# Patient Record
Sex: FEMALE | Race: OTHER | Employment: PART TIME | URBAN - METROPOLITAN AREA
[De-identification: names, ages, dates, MRNs, and addresses within clinical notes are randomized per-mention and may not be internally consistent; named-entity substitution may affect disease eponyms.]

---

## 2017-03-28 ENCOUNTER — TELEPHONE (OUTPATIENT)
Dept: INTERNAL MEDICINE CLINIC | Age: 27
End: 2017-03-28

## 2017-03-28 NOTE — TELEPHONE ENCOUNTER
Pt (p) [phone], pt would like a return call back to see if she can get the lab Core telephone number from the location that is right next door to the practice.

## 2017-03-31 ENCOUNTER — OFFICE VISIT (OUTPATIENT)
Dept: INTERNAL MEDICINE CLINIC | Age: 27
End: 2017-03-31

## 2017-03-31 VITALS
BODY MASS INDEX: 26.77 KG/M2 | HEIGHT: 61 IN | WEIGHT: 141.8 LBS | DIASTOLIC BLOOD PRESSURE: 62 MMHG | TEMPERATURE: 97.9 F | HEART RATE: 79 BPM | RESPIRATION RATE: 14 BRPM | SYSTOLIC BLOOD PRESSURE: 120 MMHG | OXYGEN SATURATION: 98 %

## 2017-03-31 DIAGNOSIS — F41.9 ANXIETY: ICD-10-CM

## 2017-03-31 DIAGNOSIS — F90.0 ATTENTION DEFICIT HYPERACTIVITY DISORDER (ADHD), PREDOMINANTLY INATTENTIVE TYPE: Primary | ICD-10-CM

## 2017-03-31 RX ORDER — DEXTROAMPHETAMINE SACCHARATE, AMPHETAMINE ASPARTATE, DEXTROAMPHETAMINE SULFATE AND AMPHETAMINE SULFATE 5; 5; 5; 5 MG/1; MG/1; MG/1; MG/1
20 TABLET ORAL 3 TIMES DAILY
Qty: 90 TAB | Refills: 0 | Status: SHIPPED | OUTPATIENT
Start: 2017-06-30 | End: 2017-05-03 | Stop reason: SDUPTHER

## 2017-03-31 RX ORDER — DEXTROAMPHETAMINE SACCHARATE, AMPHETAMINE ASPARTATE, DEXTROAMPHETAMINE SULFATE AND AMPHETAMINE SULFATE 5; 5; 5; 5 MG/1; MG/1; MG/1; MG/1
20 TABLET ORAL 3 TIMES DAILY
Qty: 90 TAB | Refills: 0 | Status: SHIPPED | OUTPATIENT
Start: 2017-08-31 | End: 2017-05-02 | Stop reason: SDUPTHER

## 2017-03-31 RX ORDER — DEXTROAMPHETAMINE SACCHARATE, AMPHETAMINE ASPARTATE, DEXTROAMPHETAMINE SULFATE AND AMPHETAMINE SULFATE 5; 5; 5; 5 MG/1; MG/1; MG/1; MG/1
20 TABLET ORAL 3 TIMES DAILY
Qty: 90 TAB | Refills: 0 | Status: SHIPPED | OUTPATIENT
Start: 2017-04-30 | End: 2017-10-11

## 2017-03-31 RX ORDER — ALPRAZOLAM 0.5 MG/1
0.5 TABLET ORAL
Qty: 90 TAB | Refills: 5 | Status: SHIPPED | OUTPATIENT
Start: 2017-03-31 | End: 2017-07-09 | Stop reason: SDUPTHER

## 2017-03-31 RX ORDER — DEXTROAMPHETAMINE SACCHARATE, AMPHETAMINE ASPARTATE, DEXTROAMPHETAMINE SULFATE AND AMPHETAMINE SULFATE 5; 5; 5; 5 MG/1; MG/1; MG/1; MG/1
20 TABLET ORAL 3 TIMES DAILY
Qty: 90 TAB | Refills: 0 | Status: SHIPPED | OUTPATIENT
Start: 2017-05-31 | End: 2017-09-20 | Stop reason: SDUPTHER

## 2017-03-31 RX ORDER — DEXTROAMPHETAMINE SACCHARATE, AMPHETAMINE ASPARTATE, DEXTROAMPHETAMINE SULFATE AND AMPHETAMINE SULFATE 5; 5; 5; 5 MG/1; MG/1; MG/1; MG/1
20 TABLET ORAL 3 TIMES DAILY
Qty: 90 TAB | Refills: 0 | Status: SHIPPED | OUTPATIENT
Start: 2017-03-31 | End: 2017-10-11

## 2017-03-31 RX ORDER — DEXTROAMPHETAMINE SACCHARATE, AMPHETAMINE ASPARTATE, DEXTROAMPHETAMINE SULFATE AND AMPHETAMINE SULFATE 5; 5; 5; 5 MG/1; MG/1; MG/1; MG/1
20 TABLET ORAL 3 TIMES DAILY
Qty: 90 TAB | Refills: 0 | Status: SHIPPED | OUTPATIENT
Start: 2017-07-31 | End: 2017-05-03 | Stop reason: SDUPTHER

## 2017-03-31 NOTE — PROGRESS NOTES
HISTORY OF PRESENT ILLNESS  Gbaby Baer is a 27 y.o. female. HPI  Here for add. She is a  doing well with her meds. She is recently . Her  is bipolar so this is stressful. She needs her xanax refilled. Past Medical History:   Diagnosis Date    ADHD (attention deficit hyperactivity disorder)     Anxiety     Depression 4/3/2010    Glucose intolerance (impaired glucose tolerance)     IBD (inflammatory bowel disease)     Insomnia 4/3/2010     Current Outpatient Prescriptions   Medication Sig    [START ON 8/31/2017] dextroamphetamine-amphetamine (ADDERALL) 20 mg tablet Take 1 Tab (20 mg total) by mouth three (3) times dailyEarliest Fill Date: 8/31/17.  [START ON 7/31/2017] dextroamphetamine-amphetamine (ADDERALL) 20 mg tablet Take 1 Tab (20 mg total) by mouth three (3) times dailyEarliest Fill Date: 7/31/17. Max Daily Amount: 60 mg    [START ON 6/30/2017] dextroamphetamine-amphetamine (ADDERALL) 20 mg tablet Take 1 Tab (20 mg total) by mouth three (3) times dailyEarliest Fill Date: 6/30/17. Max Daily Amount: 60 mg    [START ON 5/31/2017] dextroamphetamine-amphetamine (ADDERALL) 20 mg tablet Take 1 Tab (20 mg total) by mouth three (3) times dailyEarliest Fill Date: 5/31/17. Max Daily Amount: 60 mg    [START ON 4/30/2017] dextroamphetamine-amphetamine (ADDERALL) 20 mg tablet Take 1 Tab (20 mg total) by mouth three (3) times dailyEarliest Fill Date: 4/30/17. Max Daily Amount: 60 mg    dextroamphetamine-amphetamine (ADDERALL) 20 mg tablet Take 1 Tab (20 mg total) by mouth three (3) times daily. Max Daily Amount: 60 mg    ALPRAZolam (XANAX) 0.5 mg tablet Take 1 Tab by mouth three (3) times daily as needed for Anxiety. Max Daily Amount: 1.5 mg.    sulfaSALAzine EC (AZULFIDINE) 500 mg EC tablet Take 500 mg by mouth four (4) times daily.  valACYclovir (VALTREX) 500 mg tablet Take  by mouth two (2) times a day.     loperamide (IMODIUM) 2 mg capsule TAKE ONE CAPSULE BY MOUTH FOUR TIMES DAILY AS NEEDED FOR DIARRHEA     No current facility-administered medications for this visit. Review of Systems   All other systems reviewed and are negative. Visit Vitals    /62 (BP 1 Location: Left arm, BP Patient Position: Sitting)    Pulse 79    Temp 97.9 °F (36.6 °C) (Oral)    Resp 14    Ht 5' 1\" (1.549 m)    Wt 141 lb 12.8 oz (64.3 kg)    SpO2 98%    BMI 26.79 kg/m2       Physical Exam   Constitutional: She appears well-developed and well-nourished. Psychiatric: She has a normal mood and affect. Her behavior is normal. Judgment and thought content normal.   Nursing note and vitals reviewed. ASSESSMENT and PLAN  Renita Rai was seen today for depression, anxiety and inflammatory bowel disease. Diagnoses and all orders for this visit:    Attention deficit hyperactivity disorder (ADHD), predominantly inattentive type  -     dextroamphetamine-amphetamine (ADDERALL) 20 mg tablet; Take 1 Tab (20 mg total) by mouth three (3) times dailyEarliest Fill Date: 8/31/17.  -     dextroamphetamine-amphetamine (ADDERALL) 20 mg tablet; Take 1 Tab (20 mg total) by mouth three (3) times dailyEarliest Fill Date: 7/31/17. Max Daily Amount: 60 mg  -     dextroamphetamine-amphetamine (ADDERALL) 20 mg tablet; Take 1 Tab (20 mg total) by mouth three (3) times dailyEarliest Fill Date: 6/30/17. Max Daily Amount: 60 mg  -     dextroamphetamine-amphetamine (ADDERALL) 20 mg tablet; Take 1 Tab (20 mg total) by mouth three (3) times dailyEarliest Fill Date: 5/31/17. Max Daily Amount: 60 mg  -     dextroamphetamine-amphetamine (ADDERALL) 20 mg tablet; Take 1 Tab (20 mg total) by mouth three (3) times dailyEarliest Fill Date: 4/30/17. Max Daily Amount: 60 mg  -     dextroamphetamine-amphetamine (ADDERALL) 20 mg tablet; Take 1 Tab (20 mg total) by mouth three (3) times daily. Max Daily Amount: 60 mg  The current medical regimen is effective;  continue present plan and medications.     Anxiety  - ALPRAZolam (XANAX) 0.5 mg tablet; Take 1 Tab by mouth three (3) times daily as needed for Anxiety. Max Daily Amount: 1.5 mg. The current medical regimen is effective;  continue present plan and medications.     Reviewed plan of care with the patient who has provided input and agrees with the goals

## 2017-03-31 NOTE — PROGRESS NOTES
1. Have you been to the ER, urgent care clinic since your last visit? Hospitalized since your last visit? Yes, Monday to [facility], dx with tonsilitis, on Rx but unsure of name    2. Have you seen or consulted any other health care providers outside of the [address] [place] [address] since your last visit? Include any pap smears or colon screening. See above    Chief Complaint   Patient presents with    Depression    Anxiety    Inflammatory Bowel Disease     Not fasting. Requesting refill of Adderall.

## 2017-03-31 NOTE — MR AVS SNAPSHOT
Visit Information Date & Time Provider Department Dept. Phone Encounter #  
 3/31/2017 10:15 AM Honorio Dye MD [facility] [phone] Upcoming Health Maintenance Date Due DTaP/Tdap/Td series (1 - Tdap) 6/11/2009 PAP AKA CERVICAL CYTOLOGY 7/1/2017 COLONOSCOPY 1/21/2025 Allergies as of 3/31/2017  Review Complete On: 3/31/2017 By: Yair Oviedo Severity Noted Reaction Type Reactions Cephalexin Medium 06/08/2015   Topical Rash Pcn [Penicillins]  02/05/2013    Rash Current Immunizations  Reviewed on 10/3/2016 Name Date Influenza Vaccine 10/29/2015, 11/5/2014, 9/11/2013, 12/5/2012 Influenza Vaccine Irvinryan Luis Antonio) 10/3/2016 Not reviewed this visit You Were Diagnosed With   
  
 Codes Comments Attention deficit hyperactivity disorder (ADHD), predominantly inattentive type    -  Primary ICD-10-CM: F90.0 ICD-9-CM: 314.00 Anxiety     ICD-10-CM: F41.9 ICD-9-CM: 300.00 Vitals BP Pulse Temp Resp Height(growth percentile) Weight(growth percentile) 120/62 (BP 1 Location: Left arm, BP Patient Position: Sitting) 79 97.9 °F (36.6 °C) (Oral) 14 5' 1\" (1.549 m) 141 lb 12.8 oz (64.3 kg) SpO2 BMI OB Status Smoking Status 98% 26.79 kg/m2 Medically Induced Former Smoker Vitals History BMI and BSA Data Body Mass Index Body Surface Area  
 26.79 kg/m 2 1.66 m 2 Preferred Pharmacy Pharmacy Name Phone [facility] [address] [phone] Your Updated Medication List  
  
   
This list is accurate as of: 3/31/17 10:42 AM.  Always use your most recent med list.  
  
  
  
  
 ALPRAZolam 0.5 mg tablet Commonly known as:  Vivian Leaks Take 1 Tab by mouth three (3) times daily as needed for Anxiety. Max Daily Amount: 1.5 mg.  
  
 * dextroamphetamine-amphetamine 20 mg tablet Commonly known as:  ADDERALL Take 1 Tab (20 mg total) by mouth three (3) times daily. Max Daily Amount: 60 mg  
  
 * dextroamphetamine-amphetamine 20 mg tablet Commonly known as:  ADDERALL Take 1 Tab (20 mg total) by mouth three (3) times dailyEarliest Fill Date: 4/30/17. Max Daily Amount: 60 mg  
Start taking on:  4/30/2017 * dextroamphetamine-amphetamine 20 mg tablet Commonly known as:  ADDERALL Take 1 Tab (20 mg total) by mouth three (3) times dailyEarliest Fill Date: 5/31/17. Max Daily Amount: 60 mg  
Start taking on:  5/31/2017 * dextroamphetamine-amphetamine 20 mg tablet Commonly known as:  ADDERALL Take 1 Tab (20 mg total) by mouth three (3) times dailyEarliest Fill Date: 6/30/17. Max Daily Amount: 60 mg  
Start taking on:  6/30/2017 * dextroamphetamine-amphetamine 20 mg tablet Commonly known as:  ADDERALL Take 1 Tab (20 mg total) by mouth three (3) times dailyEarliest Fill Date: 7/31/17. Max Daily Amount: 60 mg  
Start taking on:  7/31/2017 * dextroamphetamine-amphetamine 20 mg tablet Commonly known as:  ADDERALL Take 1 Tab (20 mg total) by mouth three (3) times dailyEarliest Fill Date: 8/31/17. Start taking on:  8/31/2017  
  
 loperamide 2 mg capsule Commonly known as:  IMODIUM  
TAKE ONE CAPSULE BY MOUTH FOUR TIMES DAILY AS NEEDED FOR DIARRHEA  
  
 sulfaSALAzine  mg EC tablet Commonly known as:  AZULFIDINE Take 500 mg by mouth four (4) times daily. valACYclovir 500 mg tablet Commonly known as:  VALTREX Take  by mouth two (2) times a day. * Notice: This list has 6 medication(s) that are the same as other medications prescribed for you. Read the directions carefully, and ask your doctor or other care provider to review them with you. Prescriptions Printed Refills  
 dextroamphetamine-amphetamine (ADDERALL) 20 mg tablet 0 Starting on: 8/31/2017  Sig: Take 1 Tab (20 mg total) by mouth three (3) times dailyEarliest Fill Date: 8/31/17. Class: Print Route: Oral  
 dextroamphetamine-amphetamine (ADDERALL) 20 mg tablet 0 Starting on: 7/31/2017 Sig: Take 1 Tab (20 mg total) by mouth three (3) times dailyEarliest Fill Date: 7/31/17. Max Daily Amount: 60 mg  
 Class: Print Route: Oral  
 dextroamphetamine-amphetamine (ADDERALL) 20 mg tablet 0 Starting on: 6/30/2017 Sig: Take 1 Tab (20 mg total) by mouth three (3) times dailyEarliest Fill Date: 6/30/17. Max Daily Amount: 60 mg  
 Class: Print Route: Oral  
 dextroamphetamine-amphetamine (ADDERALL) 20 mg tablet 0 Starting on: 5/31/2017 Sig: Take 1 Tab (20 mg total) by mouth three (3) times dailyEarliest Fill Date: 5/31/17. Max Daily Amount: 60 mg  
 Class: Print Route: Oral  
 dextroamphetamine-amphetamine (ADDERALL) 20 mg tablet 0 Starting on: 4/30/2017 Sig: Take 1 Tab (20 mg total) by mouth three (3) times dailyEarliest Fill Date: 4/30/17. Max Daily Amount: 60 mg  
 Class: Print Route: Oral  
 dextroamphetamine-amphetamine (ADDERALL) 20 mg tablet 0 Sig: Take 1 Tab (20 mg total) by mouth three (3) times daily. Max Daily Amount: 60 mg  
 Class: Print Route: Oral  
 ALPRAZolam (XANAX) 0.5 mg tablet 5 Sig: Take 1 Tab by mouth three (3) times daily as needed for Anxiety. Max Daily Amount: 1.5 mg.  
 Class: Print Route: Oral  
  
Introducing [facility] & HEALTH SERVICES! [employer] introduces [employer] patient portal. Now you can access parts of your medical record, email your doctor's office, and request medication refills online. 1. In your internet browser, go to [email or web address] 2. Click on the First Time User? Click Here link in the Sign In box. You will see the New Member Sign Up page. 3. Enter your Everloop Access Code exactly as it appears below. You will not need to use this code after youve completed the sign-up process. If you do not sign up before the expiration date, you must request a new code. · [employer] Access Code: [email or web address] Expires: 6/29/2017 10:42 AM 
 
4. Enter the last four digits of your Social Security Number (xxxx) and Date of Birth (mm/dd/yyyy) as indicated and click Submit. You will be taken to the next sign-up page. 5. Create a [employer] ID. This will be your [employer] login ID and cannot be changed, so think of one that is secure and easy to remember. 6. Create a [employer] password. You can change your password at any time. 7. Enter your Password Reset Question and Answer. This can be used at a later time if you forget your password. 8. Enter your e-mail address. You will receive e-mail notification when new information is available in [address] 9. Click Sign Up. You can now view and download portions of your medical record. 10. Click the Download Summary menu link to download a portable copy of your medical information. If you have questions, please visit the Frequently Asked Questions section of the [employer] website. Remember, [employer] is NOT to be used for urgent needs. For medical emergencies, dial 911. Now available from your iPhone and Android! Please provide this summary of care documentation to your next provider. Your primary care clinician is listed as [id number] [address] [place] [address] [address]. If you have any questions after today's visit, please call [phone].

## 2017-05-02 DIAGNOSIS — F90.0 ATTENTION DEFICIT HYPERACTIVITY DISORDER (ADHD), PREDOMINANTLY INATTENTIVE TYPE: ICD-10-CM

## 2017-05-03 ENCOUNTER — TELEPHONE (OUTPATIENT)
Dept: INTERNAL MEDICINE CLINIC | Age: 27
End: 2017-05-03

## 2017-05-03 RX ORDER — DEXTROAMPHETAMINE SACCHARATE, AMPHETAMINE ASPARTATE, DEXTROAMPHETAMINE SULFATE AND AMPHETAMINE SULFATE 5; 5; 5; 5 MG/1; MG/1; MG/1; MG/1
20 TABLET ORAL 3 TIMES DAILY
Qty: 90 TAB | Refills: 0 | Status: SHIPPED | OUTPATIENT
Start: 2017-05-03 | End: 2017-09-20 | Stop reason: SDUPTHER

## 2017-05-03 RX ORDER — DEXTROAMPHETAMINE SACCHARATE, AMPHETAMINE ASPARTATE, DEXTROAMPHETAMINE SULFATE AND AMPHETAMINE SULFATE 5; 5; 5; 5 MG/1; MG/1; MG/1; MG/1
20 TABLET ORAL 3 TIMES DAILY
Qty: 90 TAB | Refills: 0 | Status: SHIPPED | OUTPATIENT
Start: 2017-07-01 | End: 2017-09-20 | Stop reason: SDUPTHER

## 2017-05-03 RX ORDER — DEXTROAMPHETAMINE SACCHARATE, AMPHETAMINE ASPARTATE, DEXTROAMPHETAMINE SULFATE AND AMPHETAMINE SULFATE 5; 5; 5; 5 MG/1; MG/1; MG/1; MG/1
20 TABLET ORAL 3 TIMES DAILY
Qty: 90 TAB | Refills: 0 | Status: SHIPPED | OUTPATIENT
Start: 2017-06-02 | End: 2017-09-20 | Stop reason: SDUPTHER

## 2017-07-18 ENCOUNTER — OFFICE VISIT (OUTPATIENT)
Dept: INTERNAL MEDICINE CLINIC | Age: 27
End: 2017-07-18

## 2017-07-18 VITALS
DIASTOLIC BLOOD PRESSURE: 70 MMHG | HEIGHT: 61 IN | BODY MASS INDEX: 25.37 KG/M2 | OXYGEN SATURATION: 99 % | HEART RATE: 84 BPM | TEMPERATURE: 98.1 F | WEIGHT: 134.4 LBS | RESPIRATION RATE: 18 BRPM | SYSTOLIC BLOOD PRESSURE: 113 MMHG

## 2017-07-18 DIAGNOSIS — J00 ACUTE NASOPHARYNGITIS: Primary | ICD-10-CM

## 2017-07-18 LAB
S PYO AG THROAT QL: NORMAL
VALID INTERNAL CONTROL?: YES

## 2017-07-18 NOTE — MR AVS SNAPSHOT
Visit Information Date & Time Provider Department Dept. Phone Encounter #  
 7/18/2017  1:45 PM Anselmo Bustos MD [facility] [phone] [id number] Follow-up Instructions Return if symptoms worsen or fail to improve. Follow-up and Disposition History Your Appointments 9/20/2017 10:45 AM  
ROUTINE CARE with Anselmo Bustos MD  
[facility] Appt Note: 5 month f/u  
 [place] [address] Pending sale to [facility] [address]  
[address] [address] [address] [address] Upcoming Health Maintenance Date Due DTaP/Tdap/Td series (1 - Tdap) 6/11/2009 PAP AKA CERVICAL CYTOLOGY 7/1/2017 INFLUENZA AGE 9 TO ADULT 8/1/2017 COLONOSCOPY 1/21/2025 Allergies as of 7/18/2017  Review Complete On: 7/18/2017 By: Cindra Channel Severity Noted Reaction Type Reactions Cephalexin Medium 06/08/2015   Topical Rash Pcn [Penicillins]  02/05/2013    Rash Current Immunizations  Reviewed on 10/3/2016 Name Date Influenza Vaccine 10/29/2015, 11/5/2014, 9/11/2013, 12/5/2012 Influenza Vaccine Teresa Tejeda) 10/3/2016 Not reviewed this visit You Were Diagnosed With   
  
 Codes Comments Acute nasopharyngitis    -  Primary ICD-10-CM: [place] ICD-9-CM: 203 Vitals BP Pulse Temp Resp Height(growth percentile) Weight(growth percentile) 113/70 (BP 1 Location: Left arm, BP Patient Position: At rest) 84 98.1 °F (36.7 °C) (Oral) 18 5' 1\" (1.549 m) 134 lb 6.4 oz (61 kg) SpO2 BMI OB Status Smoking Status 99% 25.39 kg/m2 Medically Induced Former Smoker BMI and BSA Data Body Mass Index Body Surface Area  
 25.39 kg/m 2 1.62 m 2 Preferred Pharmacy Pharmacy Name Phone [facility] [address] AT  [address]  [phone] Your Updated Medication List  
  
   
 This list is accurate as of: 7/18/17  2:27 PM.  Always use your most recent med list.  
  
  
  
  
 ALPRAZolam 0.5 mg tablet Commonly known as:  XANAX  
TAKE 1 TABLET BY MOUTH THREE TIMES DAILY AS NEEDED FOR ANXIETY. MAX DAILY AMOUNT: 1.5 MG.  
  
 * dextroamphetamine-amphetamine 20 mg tablet Commonly known as:  ADDERALL Take 1 Tab (20 mg total) by mouth three (3) times daily. Max Daily Amount: 60 mg  
  
 * dextroamphetamine-amphetamine 20 mg tablet Commonly known as:  ADDERALL Take 1 Tab (20 mg total) by mouth three (3) times dailyEarliest Fill Date: 4/30/17. Max Daily Amount: 60 mg  
  
 * dextroamphetamine-amphetamine 20 mg tablet Commonly known as:  ADDERALL Take 1 Tab (20 mg total) by mouth three (3) times dailyEarliest Fill Date: 5/3/17. Max Daily Amount: 60 mg  
  
 * dextroamphetamine-amphetamine 20 mg tablet Commonly known as:  ADDERALL Take 1 Tab (20 mg total) by mouth three (3) times dailyEarliest Fill Date: 5/31/17. Max Daily Amount: 60 mg  
  
 * dextroamphetamine-amphetamine 20 mg tablet Commonly known as:  ADDERALL Take 1 Tab (20 mg total) by mouth three (3) times dailyEarliest Fill Date: 6/2/17. * dextroamphetamine-amphetamine 20 mg tablet Commonly known as:  ADDERALL Take 1 Tab (20 mg total) by mouth three (3) times dailyEarliest Fill Date: 7/1/17. Max Daily Amount: 60 mg  
  
 loperamide 2 mg capsule Commonly known as:  IMODIUM  
TAKE ONE CAPSULE BY MOUTH FOUR TIMES DAILY AS NEEDED FOR DIARRHEA  
  
 sulfaSALAzine  mg EC tablet Commonly known as:  AZULFIDINE Take 500 mg by mouth four (4) times daily. valACYclovir 500 mg tablet Commonly known as:  VALTREX Take  by mouth two (2) times a day. * Notice: This list has 6 medication(s) that are the same as other medications prescribed for you. Read the directions carefully, and ask your doctor or other care provider to review them with you. We Performed the Following AMB POC RAPID STREP A [10737 CPT(R)] CULTURE, STREP THROAT [id number] CPT(R)] Follow-up Instructions Return if symptoms worsen or fail to improve. Introducing [facility] & HEALTH SERVICES! [employer] introduces [employer] patient portal. Now you can access parts of your medical record, email your doctor's office, and request medication refills online. 1. In your internet browser, go to [email or web address] 2. Click on the First Time User? Click Here link in the Sign In box. You will see the New Member Sign Up page. 3. Enter your [employer] Access Code exactly as it appears below. You will not need to use this code after youve completed the sign-up process. If you do not sign up before the expiration date, you must request a new code. · [employer] Access Code: [email or web address] Expires: 10/16/2017  2:22 PM 
 
4. Enter the last four digits of your Social Security Number (xxxx) and Date of Birth (mm/dd/yyyy) as indicated and click Submit. You will be taken to the next sign-up page. 5. Create a [employer] ID. This will be your [employer] login ID and cannot be changed, so think of one that is secure and easy to remember. 6. Create a [employer] password. You can change your password at any time. 7. Enter your Password Reset Question and Answer. This can be used at a later time if you forget your password. 8. Enter your e-mail address. You will receive e-mail notification when new information is available in [address] 9. Click Sign Up. You can now view and download portions of your medical record. 10. Click the Download Summary menu link to download a portable copy of your medical information. If you have questions, please visit the Frequently Asked Questions section of the [employer] website. Remember, [employer] is NOT to be used for urgent needs. For medical emergencies, dial 911. Now available from your iPhone and Android! Please provide this summary of care documentation to your next provider. Your primary care clinician is listed as [address] [address] [place] [address] [address]. If you have any questions after today's visit, please call [phone].

## 2017-07-18 NOTE — PROGRESS NOTES
HISTORY OF PRESENT ILLNESS  Manohar Meléndez is a 32 y.o. female. HPI  Here for sore throat since Sunday. She has some chills and drainage with more congestion now. No known exposures. Past Medical History:   Diagnosis Date    ADHD (attention deficit hyperactivity disorder)     Anxiety     Bipolar 1 disorder (HCC)     Depression 4/3/2010    Glucose intolerance (impaired glucose tolerance)     IBD (inflammatory bowel disease)     Insomnia 4/3/2010     Current Outpatient Prescriptions   Medication Sig    ALPRAZolam (XANAX) 0.5 mg tablet TAKE 1 TABLET BY MOUTH THREE TIMES DAILY AS NEEDED FOR ANXIETY. MAX DAILY AMOUNT: 1.5 MG.  dextroamphetamine-amphetamine (ADDERALL) 20 mg tablet Take 1 Tab (20 mg total) by mouth three (3) times dailyEarliest Fill Date: 5/31/17. Max Daily Amount: 60 mg    valACYclovir (VALTREX) 500 mg tablet Take  by mouth two (2) times a day.  dextroamphetamine-amphetamine (ADDERALL) 20 mg tablet Take 1 Tab (20 mg total) by mouth three (3) times dailyEarliest Fill Date: 6/2/17.  dextroamphetamine-amphetamine (ADDERALL) 20 mg tablet Take 1 Tab (20 mg total) by mouth three (3) times dailyEarliest Fill Date: 5/3/17. Max Daily Amount: 60 mg    dextroamphetamine-amphetamine (ADDERALL) 20 mg tablet Take 1 Tab (20 mg total) by mouth three (3) times dailyEarliest Fill Date: 7/1/17. Max Daily Amount: 60 mg    dextroamphetamine-amphetamine (ADDERALL) 20 mg tablet Take 1 Tab (20 mg total) by mouth three (3) times dailyEarliest Fill Date: 4/30/17. Max Daily Amount: 60 mg    dextroamphetamine-amphetamine (ADDERALL) 20 mg tablet Take 1 Tab (20 mg total) by mouth three (3) times daily. Max Daily Amount: 60 mg    sulfaSALAzine EC (AZULFIDINE) 500 mg EC tablet Take 500 mg by mouth four (4) times daily.  loperamide (IMODIUM) 2 mg capsule TAKE ONE CAPSULE BY MOUTH FOUR TIMES DAILY AS NEEDED FOR DIARRHEA     No current facility-administered medications for this visit.         Review of Systems   All other systems reviewed and are negative. Visit Vitals    /70 (BP 1 Location: Left arm, BP Patient Position: At rest)    Pulse 84    Temp 98.1 °F (36.7 °C) (Oral)    Resp 18    Ht 5' 1\" (1.549 m)    Wt 134 lb 6.4 oz (61 kg)    SpO2 99%    BMI 25.39 kg/m2       Physical Exam   Constitutional: She appears well-developed and well-nourished. HENT:   Mouth/Throat: Oropharynx is clear and moist.   Neck: Neck supple. No thyromegaly present. Pulmonary/Chest: Effort normal and breath sounds normal. No respiratory distress. She has no wheezes. She has no rales. Lymphadenopathy:     She has no cervical adenopathy. Nursing note and vitals reviewed. ASSESSMENT and PLAN  Boone Baker was seen today for sore throat and headache. Diagnoses and all orders for this visit:    Acute nasopharyngitis  -     AMB POC RAPID STREP A  -     CULTURE, STREP THROAT  ADVIL 600 mg qid.     Reviewed plan of care with the patient who has provided input and agrees with the goals

## 2017-07-18 NOTE — PROGRESS NOTES
1. Have you been to the ER, urgent care clinic since your last visit? No  Hospitalized since your last visit?no  2. Have you seen or consulted any other health care providers outside of the [address] [place] [address] since your last visit? Include any pap smears or colon screening.  Yes    Chief Complaint   Patient presents with    Sore Throat     for the last 2 days    Headache     for 1 day     Not fasting

## 2017-07-19 ENCOUNTER — TELEPHONE (OUTPATIENT)
Dept: INTERNAL MEDICINE CLINIC | Age: 27
End: 2017-07-19

## 2017-07-19 NOTE — TELEPHONE ENCOUNTER
Spoke with patient. Advised per Dr Micheal Oleary that this is a viral infection of the sinus. Call if not better one week.  Patient verbalized understanding.

## 2017-07-20 LAB — S PYO THROAT QL CULT: ABNORMAL

## 2017-07-21 ENCOUNTER — TELEPHONE (OUTPATIENT)
Dept: INTERNAL MEDICINE CLINIC | Age: 27
End: 2017-07-21

## 2017-07-21 RX ORDER — CLARITHROMYCIN 500 MG/1
500 TABLET, FILM COATED ORAL 2 TIMES DAILY
Qty: 20 TAB | Refills: 0 | Status: SHIPPED | OUTPATIENT
Start: 2017-07-21 | End: 2017-09-20

## 2017-07-21 NOTE — TELEPHONE ENCOUNTER
Writer spoke with patient and informed her that Strep was positive and antibiotics were called into local pharmacy.  Patient states understanding

## 2017-07-21 NOTE — LETTER
NOTIFICATION RETURN TO WORK / SCHOOL 
 
7/21/2017 1:58 PM 
 
Ms. Surya Campbell [facility] 
[facility] To Whom It May Concern: 
 
Surya Campbell is currently under the care of Burt Mckeon.. From August 17th, 2017 She will return to work: Monday, August 21 2017 If there are questions or concerns please have the patient contact our office.  
 
 
 
Sincerely, 
 
 
Júnior Preston MD

## 2017-09-20 ENCOUNTER — OFFICE VISIT (OUTPATIENT)
Dept: INTERNAL MEDICINE CLINIC | Age: 27
End: 2017-09-20

## 2017-09-20 VITALS
TEMPERATURE: 97.9 F | BODY MASS INDEX: 24.43 KG/M2 | WEIGHT: 129.4 LBS | RESPIRATION RATE: 17 BRPM | HEART RATE: 77 BPM | HEIGHT: 61 IN | OXYGEN SATURATION: 97 % | DIASTOLIC BLOOD PRESSURE: 60 MMHG | SYSTOLIC BLOOD PRESSURE: 118 MMHG

## 2017-09-20 DIAGNOSIS — F43.20 ADULT SITUATIONAL STRESS DISORDER: ICD-10-CM

## 2017-09-20 DIAGNOSIS — F41.9 ANXIETY: ICD-10-CM

## 2017-09-20 DIAGNOSIS — F90.0 ATTENTION DEFICIT HYPERACTIVITY DISORDER (ADHD), PREDOMINANTLY INATTENTIVE TYPE: Primary | ICD-10-CM

## 2017-09-20 RX ORDER — DEXTROAMPHETAMINE SACCHARATE, AMPHETAMINE ASPARTATE, DEXTROAMPHETAMINE SULFATE AND AMPHETAMINE SULFATE 5; 5; 5; 5 MG/1; MG/1; MG/1; MG/1
20 TABLET ORAL 3 TIMES DAILY
Qty: 90 TAB | Refills: 0 | Status: SHIPPED | OUTPATIENT
Start: 2018-01-20 | End: 2017-10-11

## 2017-09-20 RX ORDER — DEXTROAMPHETAMINE SACCHARATE, AMPHETAMINE ASPARTATE, DEXTROAMPHETAMINE SULFATE AND AMPHETAMINE SULFATE 5; 5; 5; 5 MG/1; MG/1; MG/1; MG/1
20 TABLET ORAL 3 TIMES DAILY
Qty: 90 TAB | Refills: 0 | Status: SHIPPED | OUTPATIENT
Start: 2017-12-20 | End: 2017-10-11

## 2017-09-20 RX ORDER — DEXTROAMPHETAMINE SACCHARATE, AMPHETAMINE ASPARTATE, DEXTROAMPHETAMINE SULFATE AND AMPHETAMINE SULFATE 5; 5; 5; 5 MG/1; MG/1; MG/1; MG/1
20 TABLET ORAL 3 TIMES DAILY
Qty: 90 TAB | Refills: 0 | Status: SHIPPED | OUTPATIENT
Start: 2017-10-20 | End: 2017-09-20 | Stop reason: SDUPTHER

## 2017-09-20 RX ORDER — DEXTROAMPHETAMINE SACCHARATE, AMPHETAMINE ASPARTATE, DEXTROAMPHETAMINE SULFATE AND AMPHETAMINE SULFATE 5; 5; 5; 5 MG/1; MG/1; MG/1; MG/1
20 TABLET ORAL 3 TIMES DAILY
Qty: 90 TAB | Refills: 0 | Status: SHIPPED | OUTPATIENT
Start: 2017-11-20 | End: 2017-09-20 | Stop reason: SDUPTHER

## 2017-09-20 RX ORDER — DEXTROAMPHETAMINE SACCHARATE, AMPHETAMINE ASPARTATE, DEXTROAMPHETAMINE SULFATE AND AMPHETAMINE SULFATE 5; 5; 5; 5 MG/1; MG/1; MG/1; MG/1
20 TABLET ORAL 3 TIMES DAILY
Qty: 90 TAB | Refills: 0 | Status: SHIPPED | OUTPATIENT
Start: 2018-02-20 | End: 2017-10-11

## 2017-09-20 RX ORDER — ALPRAZOLAM 1 MG/1
1 TABLET ORAL
Qty: 90 TAB | Refills: 2 | Status: SHIPPED | OUTPATIENT
Start: 2017-09-20 | End: 2017-10-11

## 2017-09-20 RX ORDER — DEXTROAMPHETAMINE SACCHARATE, AMPHETAMINE ASPARTATE, DEXTROAMPHETAMINE SULFATE AND AMPHETAMINE SULFATE 5; 5; 5; 5 MG/1; MG/1; MG/1; MG/1
20 TABLET ORAL 3 TIMES DAILY
Qty: 90 TAB | Refills: 0 | Status: SHIPPED | OUTPATIENT
Start: 2017-09-20 | End: 2017-10-11

## 2017-09-20 NOTE — PROGRESS NOTES
1. Have you been to the ER, urgent care clinic since your last visit? Hospitalized since your last visit?no    2. Have you seen or consulted any other health care providers outside of the [facility] since your last visit? Include any pap smears or colon screening.  No    Chief Complaint   Patient presents with    Anxiety     5 months follow up     Not fasting

## 2017-09-20 NOTE — PROGRESS NOTES
HISTORY OF PRESENT ILLNESS  Becka Conroy is a 32 y.o. female. HPI  Here for ADD. She is functioning well as a . She needs refills. She has anxiety mostly at night when she and her new  have discussions. He has bipolar and suicidal ideation. He is seeing a therapist and a psychiatrist. She has self increased her xanax to two to three to sleep. She has battled depression in the past. She is unaware of having bipolar disorder though her record reflects this. Past Medical History:   Diagnosis Date    ADHD (attention deficit hyperactivity disorder)     Anxiety     Bipolar 1 disorder (HCC)     Depression 4/3/2010    Glucose intolerance (impaired glucose tolerance)     IBD (inflammatory bowel disease)     Insomnia 4/3/2010     Current Outpatient Prescriptions   Medication Sig    ALPRAZolam (XANAX) 1 mg tablet Take 1 Tab by mouth three (3) times daily as needed for Anxiety. Max Daily Amount: 3 mg.  dextroamphetamine-amphetamine (ADDERALL) 20 mg tablet Take 1 Tab (20 mg total) by mouth three (3) times daily. Max Daily Amount: 60 mg    [START ON 12/20/2017] dextroamphetamine-amphetamine (ADDERALL) 20 mg tablet Take 1 Tab (20 mg total) by mouth three (3) times dailyEarliest Fill Date: 12/20/17. Max Daily Amount: 60 mg    [START ON 2/20/2018] dextroamphetamine-amphetamine (ADDERALL) 20 mg tablet Take 1 Tab (20 mg total) by mouth three (3) times dailyEarliest Fill Date: 2/20/18. Max Daily Amount: 60 mg    [START ON 1/20/2018] dextroamphetamine-amphetamine (ADDERALL) 20 mg tablet Take 1 Tab (20 mg total) by mouth three (3) times dailyEarliest Fill Date: 1/20/18.  valACYclovir (VALTREX) 500 mg tablet Take  by mouth two (2) times a day.  dextroamphetamine-amphetamine (ADDERALL) 20 mg tablet Take 1 Tab (20 mg total) by mouth three (3) times dailyEarliest Fill Date: 4/30/17.   Max Daily Amount: 60 mg    dextroamphetamine-amphetamine (ADDERALL) 20 mg tablet Take 1 Tab (20 mg total) by mouth three (3) times daily. Max Daily Amount: 60 mg    sulfaSALAzine EC (AZULFIDINE) 500 mg EC tablet Take 500 mg by mouth four (4) times daily.  loperamide (IMODIUM) 2 mg capsule TAKE ONE CAPSULE BY MOUTH FOUR TIMES DAILY AS NEEDED FOR DIARRHEA     No current facility-administered medications for this visit. Review of Systems   All other systems reviewed and are negative. Visit Vitals    /60 (BP 1 Location: Left arm, BP Patient Position: At rest)    Pulse 77    Temp 97.9 °F (36.6 °C) (Oral)    Resp 17    Ht 5' 1\" (1.549 m)    Wt 129 lb 6.4 oz (58.7 kg)    SpO2 97%    BMI 24.45 kg/m2       Physical Exam   Constitutional: She appears well-developed and well-nourished. Psychiatric: She has a normal mood and affect. Her behavior is normal. Judgment and thought content normal.   Nursing note and vitals reviewed. ASSESSMENT and PLAN  Diagnoses and all orders for this visit:    1. Attention deficit hyperactivity disorder (ADHD), predominantly inattentive type  -     dextroamphetamine-amphetamine (ADDERALL) 20 mg tablet; Take 1 Tab (20 mg total) by mouth three (3) times daily. Max Daily Amount: 60 mg  -     dextroamphetamine-amphetamine (ADDERALL) 20 mg tablet; Take 1 Tab (20 mg total) by mouth three (3) times dailyEarliest Fill Date: 12/20/17. Max Daily Amount: 60 mg  -     dextroamphetamine-amphetamine (ADDERALL) 20 mg tablet; Take 1 Tab (20 mg total) by mouth three (3) times dailyEarliest Fill Date: 2/20/18. Max Daily Amount: 60 mg  -     dextroamphetamine-amphetamine (ADDERALL) 20 mg tablet; Take 1 Tab (20 mg total) by mouth three (3) times dailyEarliest Fill Date: 1/20/18.  -     dextroamphetamine-amphetamine (ADDERALL) 20 mg tablet; Take 1 Tab (20 mg total) by mouth three (3) times daily. Max Daily Amount: 60 mg  -     dextroamphetamine-amphetamine (ADDERALL) 20 mg tablet; Take 1 Tab (20 mg total) by mouth three (3) times dailyEarliest Fill Date: 12/20/17.   Max Daily Amount: 60 mg    2. Anxiety  -     Increase ALPRAZolam (XANAX) 1 mg tablet; Take 1 Tab by mouth three (3) times daily as needed for Anxiety.  Max Daily Amount: 3 mg.    3. Adult situational stress disorder  -     REFERRAL TO PSYCHOLOGY

## 2017-09-20 NOTE — MR AVS SNAPSHOT
Visit Information Date & Time Provider Department Dept. Phone Encounter #  
 9/20/2017 10:45 AM Ryan Davis MD [facility] [phone] [id number] Upcoming Health Maintenance Date Due DTaP/Tdap/Td series (1 - Tdap) 6/11/2009 PAP AKA CERVICAL CYTOLOGY 7/1/2017 INFLUENZA AGE 9 TO ADULT 8/1/2017 COLONOSCOPY 1/21/2025 Allergies as of 9/20/2017  Review Complete On: 9/20/2017 By: Blessing Every Severity Noted Reaction Type Reactions Cephalexin Medium 06/08/2015   Topical Rash Pcn [Penicillins]  02/05/2013    Rash Current Immunizations  Reviewed on 10/3/2016 Name Date Influenza Vaccine 10/29/2015, 11/5/2014, 9/11/2013, 12/5/2012 Influenza Vaccine Orlean Meres) 10/3/2016 Not reviewed this visit You Were Diagnosed With   
  
 Codes Comments Attention deficit hyperactivity disorder (ADHD), predominantly inattentive type    -  Primary ICD-10-CM: F90.0 ICD-9-CM: 314.00 Anxiety     ICD-10-CM: F41.9 ICD-9-CM: 300.00 Adult situational stress disorder     ICD-10-CM: F45.22 ICD-9-CM: 308. 9 Vitals BP Pulse Temp Resp Height(growth percentile) Weight(growth percentile)  
 118/60 (BP 1 Location: Left arm, BP Patient Position: At rest) 77 97.9 °F (36.6 °C) (Oral) 17 5' 1\" (1.549 m) 129 lb 6.4 oz (58.7 kg) SpO2 BMI OB Status Smoking Status 97% 24.45 kg/m2 Medically Induced Former Smoker BMI and BSA Data Body Mass Index Body Surface Area  
 24.45 kg/m 2 1.59 m 2 Preferred Pharmacy Pharmacy Name Phone [facility] [address] AT [address] [phone] Your Updated Medication List  
  
   
This list is accurate as of: 9/20/17 11:12 AM.  Always use your most recent med list.  
  
  
  
  
 ALPRAZolam 1 mg tablet Commonly known as:  Teresa Velasco Take 1 Tab by mouth three (3) times daily as needed for Anxiety.  Max Daily Amount: 3 mg.  
  
 * dextroamphetamine-amphetamine 20 mg tablet Commonly known as:  ADDERALL Take 1 Tab (20 mg total) by mouth three (3) times daily. Max Daily Amount: 60 mg  
  
 * dextroamphetamine-amphetamine 20 mg tablet Commonly known as:  ADDERALL Take 1 Tab (20 mg total) by mouth three (3) times dailyEarliest Fill Date: 4/30/17. Max Daily Amount: 60 mg  
  
 * dextroamphetamine-amphetamine 20 mg tablet Commonly known as:  ADDERALL Take 1 Tab (20 mg total) by mouth three (3) times daily. Max Daily Amount: 60 mg  
  
 * dextroamphetamine-amphetamine 20 mg tablet Commonly known as:  ADDERALL Take 1 Tab (20 mg total) by mouth three (3) times dailyEarliest Fill Date: 12/20/17. Max Daily Amount: 60 mg  
Start taking on:  12/20/2017 * dextroamphetamine-amphetamine 20 mg tablet Commonly known as:  ADDERALL Take 1 Tab (20 mg total) by mouth three (3) times dailyEarliest Fill Date: 1/20/18. Start taking on:  1/20/2018 * dextroamphetamine-amphetamine 20 mg tablet Commonly known as:  ADDERALL Take 1 Tab (20 mg total) by mouth three (3) times dailyEarliest Fill Date: 2/20/18. Max Daily Amount: 60 mg  
Start taking on:  2/20/2018  
  
 loperamide 2 mg capsule Commonly known as:  IMODIUM  
TAKE ONE CAPSULE BY MOUTH FOUR TIMES DAILY AS NEEDED FOR DIARRHEA  
  
 sulfaSALAzine  mg EC tablet Commonly known as:  AZULFIDINE Take 500 mg by mouth four (4) times daily. valACYclovir 500 mg tablet Commonly known as:  VALTREX Take  by mouth two (2) times a day. * Notice: This list has 6 medication(s) that are the same as other medications prescribed for you. Read the directions carefully, and ask your doctor or other care provider to review them with you. Prescriptions Printed Refills ALPRAZolam (XANAX) 1 mg tablet 2 Sig: Take 1 Tab by mouth three (3) times daily as needed for Anxiety. Max Daily Amount: 3 mg. Class: Print Route: Oral  
 dextroamphetamine-amphetamine (ADDERALL) 20 mg tablet 0 Sig: Take 1 Tab (20 mg total) by mouth three (3) times daily. Max Daily Amount: 60 mg  
 Class: Print Route: Oral  
 dextroamphetamine-amphetamine (ADDERALL) 20 mg tablet 0 Starting on: 12/20/2017 Sig: Take 1 Tab (20 mg total) by mouth three (3) times dailyEarliest Fill Date: 12/20/17. Max Daily Amount: 60 mg  
 Class: Print Route: Oral  
 dextroamphetamine-amphetamine (ADDERALL) 20 mg tablet 0 Starting on: 2/20/2018 Sig: Take 1 Tab (20 mg total) by mouth three (3) times dailyEarliest Fill Date: 2/20/18. Max Daily Amount: 60 mg  
 Class: Print Route: Oral  
 dextroamphetamine-amphetamine (ADDERALL) 20 mg tablet 0 Starting on: 1/20/2018 Sig: Take 1 Tab (20 mg total) by mouth three (3) times dailyEarliest Fill Date: 1/20/18. Class: Print Route: Oral  
  
We Performed the Following REFERRAL TO PSYCHOLOGY [APG31 Custom] Comments:  
 Please evaluate patient for stress. Referral Information Referral ID Referred By Referred To  
  
 [id number] Roseann Casarez Not Available Visits Status Start Date End Date 1 New Request 9/20/17 9/20/18 If your referral has a status of pending review or denied, additional information will be sent to support the outcome of this decision. Introducing [facility]! [employer] introduces Cint patient portal. Now you can access parts of your medical record, email your doctor's office, and request medication refills online. 1. In your internet browser, go to [email or web address] 2. Click on the First Time User? Click Here link in the Sign In box. You will see the New Member Sign Up page. 3. Enter your Cint Access Code exactly as it appears below. You will not need to use this code after youve completed the sign-up process. If you do not sign up before the expiration date, you must request a new code. · Intrapace Access Code: [email or web address] Expires: 10/16/2017  2:22 PM 
 
4. Enter the last four digits of your Social Security Number (xxxx) and Date of Birth (mm/dd/yyyy) as indicated and click Submit. You will be taken to the next sign-up page. 5. Create a Intrapace ID. This will be your Intrapace login ID and cannot be changed, so think of one that is secure and easy to remember. 6. Create a Intrapace password. You can change your password at any time. 7. Enter your Password Reset Question and Answer. This can be used at a later time if you forget your password. 8. Enter your e-mail address. You will receive e-mail notification when new information is available in [address] 9. Click Sign Up. You can now view and download portions of your medical record. 10. Click the Download Summary menu link to download a portable copy of your medical information. If you have questions, please visit the Frequently Asked Questions section of the Intrapace website. Remember, Intrapace is NOT to be used for urgent needs. For medical emergencies, dial 911. Now available from your iPhone and Android! Please provide this summary of care documentation to your next provider. Your primary care clinician is listed as [id number] [address] [place] [address] [address]. If you have any questions after today's visit, please call [phone].

## 2017-10-05 ENCOUNTER — TELEPHONE (OUTPATIENT)
Dept: INTERNAL MEDICINE CLINIC | Age: 27
End: 2017-10-05

## 2017-10-05 NOTE — TELEPHONE ENCOUNTER
Pt is pregnant and is on Xanax. Pt stopped a few days ago, pt states they are having side effects, anxiety, trouble processing things, weak and difficulty breathing. Should they cut it in half or stop taking it.  Please give pt a call back

## 2017-10-05 NOTE — LETTER
10/10/2017 8:08 AM 
 
Ms. Rm Soria Benjy Zac 89 
[facility] Ms. Zhanna Hester,  
 
 
Dr. Jose De Jesus Powell said to go back on xanax 1/2 TID and see me next week stay off other meds.  
 
 
 
 
Sincerely, 
 
 
Angel Gonzalez MD

## 2017-10-06 NOTE — TELEPHONE ENCOUNTER
Left message for patient to call back to [facility] per Yaneli Colin to go back on xanax 1/2 TID and see me next week stay off other meds.

## 2017-10-11 ENCOUNTER — OFFICE VISIT (OUTPATIENT)
Dept: INTERNAL MEDICINE CLINIC | Age: 27
End: 2017-10-11

## 2017-10-11 VITALS
RESPIRATION RATE: 19 BRPM | SYSTOLIC BLOOD PRESSURE: 105 MMHG | WEIGHT: 130 LBS | HEIGHT: 61 IN | TEMPERATURE: 98.2 F | OXYGEN SATURATION: 98 % | DIASTOLIC BLOOD PRESSURE: 55 MMHG | BODY MASS INDEX: 24.55 KG/M2 | HEART RATE: 77 BPM

## 2017-10-11 DIAGNOSIS — F90.0 ATTENTION DEFICIT HYPERACTIVITY DISORDER (ADHD), PREDOMINANTLY INATTENTIVE TYPE: ICD-10-CM

## 2017-10-11 DIAGNOSIS — F41.9 ANXIETY: ICD-10-CM

## 2017-10-11 DIAGNOSIS — Z3A.01 LESS THAN 8 WEEKS GESTATION OF PREGNANCY: Primary | ICD-10-CM

## 2017-10-11 DIAGNOSIS — Z23 ENCOUNTER FOR IMMUNIZATION: ICD-10-CM

## 2017-10-11 NOTE — PROGRESS NOTES
HISTORY OF PRESENT ILLNESS  Dean Macario is a 32 y.o. female. HPI  Here for pregnancy with last cycle 6 weeks ago. She is mildly nauseated. She stopped her adderall and felt fine but when she stopped her xanax she felt mentally sluggish. She used 1/2 the 1/4 tab for two days and nothing for last two days and is better. She uses benadryl for sleep. She sees GYN in two weeks. She wants a flu shot. Past Medical History:   Diagnosis Date    ADHD (attention deficit hyperactivity disorder)     Anxiety     Bipolar 1 disorder (HCC)     Depression 4/3/2010    Glucose intolerance (impaired glucose tolerance)     IBD (inflammatory bowel disease)     Insomnia 4/3/2010     Current Outpatient Prescriptions   Medication Sig    valACYclovir (VALTREX) 500 mg tablet Take  by mouth two (2) times a day.  loperamide (IMODIUM) 2 mg capsule TAKE ONE CAPSULE BY MOUTH FOUR TIMES DAILY AS NEEDED FOR DIARRHEA     No current facility-administered medications for this visit. Review of Systems   All other systems reviewed and are negative. Visit Vitals    /55 (BP 1 Location: Left arm, BP Patient Position: At rest)    Pulse 77    Temp 98.2 °F (36.8 °C) (Oral)    Resp 19    Ht 5' 1\" (1.549 m)    Wt 130 lb (59 kg)    SpO2 98%    BMI 24.56 kg/m2       Physical Exam   Constitutional: She appears well-developed and well-nourished. Psychiatric: She has a normal mood and affect. Her behavior is normal. Thought content normal.   Nursing note and vitals reviewed. ASSESSMENT and PLAN  Diagnoses and all orders for this visit:    1. Less than 8 weeks gestation of pregnancy  See GYN. 2. Anxiety  Doing fine off meds. 3. Attention deficit hyperactivity disorder (ADHD), predominantly inattentive type  Doing fine off meds.     4. Encounter for immunization    Reviewed plan of care with the patient who has provided input and agrees with the goals

## 2017-10-11 NOTE — MR AVS SNAPSHOT
Visit Information Date & Time Provider Department Dept. Phone Encounter #  
 10/11/2017  9:15 AM Altaf Marcus MD [facility] [phone] Follow-up Instructions Return if symptoms worsen or fail to improve. Follow-up and Disposition History Your Appointments 2/26/2018 11:15 AM  
ROUTINE CARE with Altaf Marcus MD  
[facility] [address]) Appt Note: 6 MONTH F/U  
 [place] [address] [facility], [facility] [address]  
[address] [facility] [address] [facility] [address] [address] Upcoming Health Maintenance Date Due DTaP/Tdap/Td series (1 - Tdap) 6/11/2009 PAP AKA CERVICAL CYTOLOGY 7/1/2017 INFLUENZA AGE 9 TO ADULT 8/1/2017 COLONOSCOPY 1/21/2025 Allergies as of 10/11/2017  Review Complete On: 10/11/2017 By: Krystina Leiva Severity Noted Reaction Type Reactions Cephalexin Medium 06/08/2015   Topical Rash Pcn [Penicillins]  02/05/2013    Rash Current Immunizations  Reviewed on 10/3/2016 Name Date Influenza Vaccine 10/29/2015, 11/5/2014, 9/11/2013, 12/5/2012 Influenza Vaccine Arletha Linnea) 10/3/2016 Not reviewed this visit You Were Diagnosed With   
  
 Codes Comments Less than 8 weeks gestation of pregnancy    -  Primary ICD-10-CM: Z3A.01 
ICD-9-CM: V22.2 Anxiety     ICD-10-CM: F41.9 ICD-9-CM: 300.00 Attention deficit hyperactivity disorder (ADHD), predominantly inattentive type     ICD-10-CM: F90.0 ICD-9-CM: 314.00 Encounter for immunization     ICD-10-CM: J96 ICD-9-CM: V03.89 Vitals BP Pulse Temp Resp Height(growth percentile) Weight(growth percentile) 105/55 (BP 1 Location: Left arm, BP Patient Position: At rest) 77 98.2 °F (36.8 °C) (Oral) 19 5' 1\" (1.549 m) 130 lb (59 kg) SpO2 BMI OB Status Smoking Status 98% 24.56 kg/m2 Pregnant Former Smoker BMI and BSA Data Body Mass Index Body Surface Area 24.56 kg/m 2 1.59 m 2 Preferred Pharmacy Pharmacy Name Phone [facility] [address] AT [address]  [phone] Your Updated Medication List  
  
   
This list is accurate as of: 10/11/17  9:44 AM.  Always use your most recent med list.  
  
  
  
  
 loperamide 2 mg capsule Commonly known as:  IMODIUM  
TAKE ONE CAPSULE BY MOUTH FOUR TIMES DAILY AS NEEDED FOR DIARRHEA  
  
 valACYclovir 500 mg tablet Commonly known as:  VALTREX Take  by mouth two (2) times a day. Follow-up Instructions Return if symptoms worsen or fail to improve. Introducing [facility]! Glenny Morocho introduces [employer] patient portal. Now you can access parts of your medical record, email your doctor's office, and request medication refills online. 1. In your internet browser, go to [email or web address] 2. Click on the First Time User? Click Here link in the Sign In box. You will see the New Member Sign Up page. 3. Enter your M[employer] Software Access Code exactly as it appears below. You will not need to use this code after youve completed the sign-up process. If you do not sign up before the expiration date, you must request a new code. · [employer] Software Access Code: [email or web address] Expires: 10/16/2017  2:22 PM 
 
4. Enter the last four digits of your Social Security Number (xxxx) and Date of Birth (mm/dd/yyyy) as indicated and click Submit. You will be taken to the next sign-up page. 5. Create a MolecuLightt ID. This will be your Migo Software login ID and cannot be changed, so think of one that is secure and easy to remember. 6. Create a MolecuLightt password. You can change your password at any time. 7. Enter your Password Reset Question and Answer. This can be used at a later time if you forget your password. 8. Enter your e-mail address.  You will receive e-mail notification when new information is available in GoNogging. 9. Click Sign Up. You can now view and download portions of your medical record. 10. Click the Download Summary menu link to download a portable copy of your medical information. If you have questions, please visit the Frequently Asked Questions section of the GoNogging website. Remember, GoNogging is NOT to be used for urgent needs. For medical emergencies, dial 911. Now available from your iPhone and Android! Please provide this summary of care documentation to your next provider. Your primary care clinician is listed as [phone] [address] [place] [address] [address]. If you have any questions after today's visit, please call [phone].

## 2017-10-11 NOTE — PROGRESS NOTES
1. Have you been to the ER, urgent care clinic since your last visit? Hospitalized since your last visit?no  2. Have you seen or consulted any other health care providers outside of the [address] [place] [address] since your last visit? Include any pap smears or colon screening.  No    Chief Complaint   Patient presents with    Medication Evaluation     Not fasting

## 2018-03-29 ENCOUNTER — TELEPHONE (OUTPATIENT)
Dept: INTERNAL MEDICINE CLINIC | Age: 28
End: 2018-03-29

## 2018-03-29 ENCOUNTER — OFFICE VISIT (OUTPATIENT)
Dept: INTERNAL MEDICINE CLINIC | Age: 28
End: 2018-03-29

## 2018-03-29 VITALS
OXYGEN SATURATION: 97 % | HEIGHT: 61 IN | TEMPERATURE: 98.2 F | WEIGHT: 163 LBS | BODY MASS INDEX: 30.78 KG/M2 | RESPIRATION RATE: 20 BRPM

## 2018-03-29 RX ORDER — MOMETASONE FUROATE 1 MG/G
OINTMENT TOPICAL DAILY
Qty: 15 G | Refills: 1 | Status: ON HOLD | OUTPATIENT
Start: 2018-03-29 | End: 2022-07-09

## 2018-03-29 NOTE — PROGRESS NOTES
Patient is going to return to back to have her physical after she has the baby in June. She reports the only thing she need is a refill of her cream. I told her I would take care of that for her today.

## 2018-03-29 NOTE — PROGRESS NOTES
HISTORY OF PRESENT ILLNESS  Aliza Rodriguez is a 32 y.o. female. HPI    ROS    Physical Exam    ASSESSMENT and PLAN  Diagnoses and all orders for this visit:    1.  ERRONEOUS ENCOUNTER--DISREGARD

## 2018-03-29 NOTE — TELEPHONE ENCOUNTER
Patient presented to the office for a physical but will rescheduled due to being 7 months pregnant. She will return after giving birth for her physical. She would like to get her elocon refilled today.

## 2018-06-14 ENCOUNTER — APPOINTMENT (OUTPATIENT)
Dept: CT IMAGING | Age: 28
End: 2018-06-14
Attending: PHYSICIAN ASSISTANT
Payer: COMMERCIAL

## 2018-06-14 ENCOUNTER — HOSPITAL ENCOUNTER (EMERGENCY)
Age: 28
Discharge: HOME | End: 2018-06-14
Attending: EMERGENCY MEDICINE
Payer: COMMERCIAL

## 2018-06-14 VITALS
RESPIRATION RATE: 18 BRPM | DIASTOLIC BLOOD PRESSURE: 69 MMHG | WEIGHT: 160 LBS | SYSTOLIC BLOOD PRESSURE: 108 MMHG | BODY MASS INDEX: 31.41 KG/M2 | TEMPERATURE: 99.5 F | OXYGEN SATURATION: 96 % | HEIGHT: 60 IN | HEART RATE: 97 BPM

## 2018-06-14 DIAGNOSIS — R06.00 DYSPNEA, UNSPECIFIED TYPE: Primary | ICD-10-CM

## 2018-06-14 LAB
ANION GAP BLD CALC-SCNC: 15 MMOL/L (ref 10–20)
BUN BLD-MCNC: 4 MG/DL (ref 9–20)
CA-I BLD-MCNC: 1.2 MMOL/L (ref 1.12–1.32)
CHLORIDE BLD-SCNC: 102 MMOL/L (ref 98–107)
CO2 BLD-SCNC: 28 MMOL/L (ref 21–32)
CREAT BLD-MCNC: 0.8 MG/DL (ref 0.6–1.3)
GLUCOSE BLD-MCNC: 102 MG/DL (ref 65–100)
HCT VFR BLD CALC: 30 % (ref 35–47)
POTASSIUM BLD-SCNC: 4 MMOL/L (ref 3.5–5.1)
SERVICE CMNT-IMP: ABNORMAL
SODIUM BLD-SCNC: 139 MMOL/L (ref 136–145)

## 2018-06-14 PROCEDURE — 74011000258 HC RX REV CODE- 258: Performed by: EMERGENCY MEDICINE

## 2018-06-14 PROCEDURE — 71275 CT ANGIOGRAPHY CHEST: CPT

## 2018-06-14 PROCEDURE — 74011250637 HC RX REV CODE- 250/637: Performed by: EMERGENCY MEDICINE

## 2018-06-14 PROCEDURE — 74011636320 HC RX REV CODE- 636/320: Performed by: EMERGENCY MEDICINE

## 2018-06-14 PROCEDURE — 80047 BASIC METABLC PNL IONIZED CA: CPT

## 2018-06-14 PROCEDURE — 99283 EMERGENCY DEPT VISIT LOW MDM: CPT

## 2018-06-14 RX ORDER — IBUPROFEN 600 MG/1
600 TABLET ORAL
Status: COMPLETED | OUTPATIENT
Start: 2018-06-14 | End: 2018-06-14

## 2018-06-14 RX ORDER — SODIUM CHLORIDE 0.9 % (FLUSH) 0.9 %
10 SYRINGE (ML) INJECTION
Status: COMPLETED | OUTPATIENT
Start: 2018-06-14 | End: 2018-06-14

## 2018-06-14 RX ADMIN — Medication 10 ML: at 16:26

## 2018-06-14 RX ADMIN — SODIUM CHLORIDE 100 ML: 900 INJECTION, SOLUTION INTRAVENOUS at 16:26

## 2018-06-14 RX ADMIN — IOPAMIDOL 70 ML: 755 INJECTION, SOLUTION INTRAVENOUS at 16:25

## 2018-06-14 RX ADMIN — IBUPROFEN 600 MG: 600 TABLET ORAL at 16:06

## 2018-06-14 NOTE — ED PROVIDER NOTES
HPI Comments: 25 y.o. female with past medical history significant for insomnia, depression, ADHD, anxiety, IBD, glucose intolerance, and bipolar 1 disorder who presents to the ED with chief complaint of SOB. Patient reports being ~6 days post-partum via ; reports this is her first child. Patient reports SOB and head \"pressure\" with onset \"yesterday. \" Patient reports being seen at [facility] for her symptoms; reports undergoing blood work, being told her D-dimer was elevated, and being referred to the ED for further evaluation. Patient denies leg swelling. There are no other acute medical concerns at this time. PCP: [id number] [facility] [address], MD    Note written by Mahogany Garcia, as dictated by Vlad Pruitt MD 3:35 PM     The history is provided by the patient. Past Medical History:   Diagnosis Date    ADHD (attention deficit hyperactivity disorder)     Anxiety     Bipolar 1 disorder ([facility] [address]     Depression 4/3/2010    Glucose intolerance (impaired glucose tolerance)     IBD (inflammatory bowel disease)     Insomnia 4/3/2010       Past Surgical History:   Procedure Laterality Date    BREAST SURGERY PROCEDURE UNLISTED      HX  SECTION           History reviewed. No pertinent family history. Social History     Social History    Marital status:      Spouse name: N/A    Number of children: N/A    Years of education: N/A     Occupational History    Not on file.      Social History Main Topics    Smoking status: Former Smoker     Packs/day: 0.30     Years: 3.00     Types: Cigarettes    Smokeless tobacco: Never Used    Alcohol use 0.0 oz/week      Comment: states drinks 1-2 drinks a month    Drug use: Not on file    Sexual activity: Yes     Partners: Male     Birth control/ protection: Pill     Other Topics Concern    Not on file     Social History Narrative         ALLERGIES: Cephalexin and Pcn [penicillins]    Review of Systems   Constitutional: Negative for chills and fever. HENT: Negative for ear pain and sore throat. Eyes: Negative for photophobia and pain. Respiratory: Positive for shortness of breath. Negative for cough and chest tightness. Cardiovascular: Negative for chest pain and palpitations. Gastrointestinal: Negative for abdominal pain, diarrhea, nausea and vomiting. Genitourinary: Negative for dysuria and flank pain. Musculoskeletal: Negative for back pain and neck pain. Skin: Negative for rash and wound. Neurological: Positive for headaches. Negative for seizures. All other systems reviewed and are negative. Vitals:    18 1511   BP: 116/65   Pulse: (!) 104   Resp: 16   Temp: 100.1 °F (37.8 °C)   SpO2: 97%   Weight: 72.6 kg (160 lb)   Height: 5' (1.524 m)            Physical Exam   Constitutional: She is oriented to person, place, and time. She appears well-developed and well-nourished. HENT:   Head: Normocephalic and atraumatic. Eyes: Conjunctivae and EOM are normal.   Cardiovascular: Normal rate, regular rhythm, normal heart sounds and intact distal pulses. No murmur heard. Pulmonary/Chest: Effort normal and breath sounds normal. No respiratory distress. She has no wheezes. She has no rales. Abdominal: Soft. Mild lower abdominal tenderness over  site   Musculoskeletal: Normal range of motion. She exhibits no tenderness or deformity. Trace LE noted   Neurological: She is alert and oriented to person, place, and time. No cranial nerve deficit. Skin: She is not diaphoretic. Psychiatric: She has a normal mood and affect. Nursing note and vitals reviewed.        MDM  Number of Diagnoses or Management Options  Dyspnea, unspecified type:   Diagnosis management comments: Patient with dyspnea after recent  delivery - no clinical signs of heart failure or fluid overload - referred to the ED for CTA chest to r/o PE.    1725 - tests neg for PE, no EMC at this time, d/c home Amount and/or Complexity of Data Reviewed  Clinical lab tests: ordered and reviewed  Tests in the radiology section of CPT®: ordered and reviewed          ED Course       Procedures           VITALS:   Patient Vitals for the past 8 hrs:   Temp Pulse Resp BP SpO2   06/14/18 1725 99.5 °F (37.5 °C) 97 18 108/69 96 %   06/14/18 1511 100.1 °F (37.8 °C) (!) 104 16 116/65 97 %                  Recent Results (from the past 24 hour(s))   POC CHEM8    Collection Time: 06/14/18  3:24 PM   Result Value Ref Range    Calcium, ionized (POC) 1.20 1. 12 - 1.32 mmol/L    Sodium (POC) 139 136 - 145 mmol/L    Potassium (POC) 4.0 3.5 - 5.1 mmol/L    Chloride (POC) 102 98 - 107 mmol/L    CO2 (POC) 28 21 - 32 mmol/L    Anion gap (POC) 15 10 - 20 mmol/L    Glucose (POC) 102 (H) 65 - 100 mg/dL    BUN (POC) 4 (L) 9 - 20 mg/dL    Creatinine (POC) 0.8 0.6 - 1.3 mg/dL    GFRAA, POC >60 >60 ml/min/1.73m2    GFRNA, POC >60 >60 ml/min/1.73m2    Hematocrit (POC) 30 (L) 35.0 - 47.0 %    Comment Notified RN or MD immediately by          Cta Chest W Or W Wo Cont    Result Date: 6/14/2018  EXAM:  CTA CHEST W OR W WO CONT INDICATION:   referred by [facility] for CTA of chest due to elevated dimer. Shortness of breath COMPARISON: None. CONTRAST:  70 mL of Isovue-370. TECHNIQUE: Precontrast  images were obtained to localize the volume for acquisition. Multislice helical CT arteriography was performed from the diaphragm to the thoracic inlet during uneventful rapid bolus intravenous contrast administration. Lung and soft tissue windows were generated. Coronal and sagittal images were generated and 3D post processing consisting of coronal maximum intensity images was performed. CT dose reduction was achieved through use of a standardized protocol tailored for this examination and automatic exposure control for dose modulation. FINDINGS: LUNGS:  The lungs are clear of mass, nodule, airspace disease or edema.  PLEURA: No pleural effusion or pneumothorax. TRACHEA/BRONCHI: Patent. PULMONARY ARTERIES: The pulmonary arteries are well enhanced and no pulmonary emboli are identified. MEDIASTINUM/RAMON: There is no mediastinal or hilar adenopathy or mass. AORTA: The aorta enhances normally without evidence of aneurysm or dissection. UPPER ABDOMEN: The visualized portions of the upper abdominal organs are normal. BONES: No sclerotic or lytic lesion. IMPRESSION: No acute finding or evidence of pulmonary embolus. Minor [place]de

## 2018-06-14 NOTE — ED NOTES
3:11 PM  I have evaluated the patient as the Provider in Triage. I have reviewed Her vital signs and the triage nurse assessment. I have talked with the patient and any available family and advised that I am the provider in triage and have ordered the appropriate study to initiate their work up based on the clinical presentation during my assessment. I have advised that the patient will be accommodated in the Main ED as soon as possible. I have also requested to contact the triage nurse or myself immediately if the patient experiences any changes in their condition during this brief waiting period. 6 days post partum (). Went to [facility] this morning with URI symptoms and shortness of breath. Someone there ordered a d-dimer which was elevated.   Pt told to come to the ED for CTA of her chest.      Nelda Spence PA-C

## 2018-06-14 NOTE — ED NOTES
Discharge instructions given to patient by nurse. Pt  verbalizes understanding. IV d/c. Pt ambulated off of unit in no signs of distress.

## 2018-06-14 NOTE — ED TRIAGE NOTES
Patient is 6 days post partum from . Reports feeling head pressure and shortness of breath since yesterday. Patient was sent by [facility] due to d dimer greater than 5. Reports dizziness with walking. Arrives with EKG and lab work from [facility].

## 2018-06-14 NOTE — DISCHARGE INSTRUCTIONS
Shortness of Breath: Care Instructions  Your Care Instructions  Shortness of breath has many causes. Sometimes conditions such as anxiety can lead to shortness of breath. Some people get mild shortness of breath when they exercise. Trouble breathing also can be a symptom of a serious problem, such as asthma, lung disease, emphysema, heart problems, and pneumonia. If your shortness of breath continues, you may need tests and treatment. Watch for any changes in your breathing and other symptoms. Follow-up care is a key part of your treatment and safety. Be sure to make and go to all appointments, and call your doctor if you are having problems. It's also a good idea to know your test results and keep a list of the medicines you take. How can you care for yourself at home? · Do not smoke or allow others to smoke around you. If you need help quitting, talk to your doctor about stop-smoking programs and medicines. These can increase your chances of quitting for good. · Get plenty of rest and sleep. · Take your medicines exactly as prescribed. Call your doctor if you think you are having a problem with your medicine. · Find healthy ways to deal with stress. ¨ Exercise daily. ¨ Get plenty of sleep. ¨ Eat regularly and well. When should you call for help? Call 911 anytime you think you may need emergency care. For example, call if:  ? · You have severe shortness of breath. ? · You have symptoms of a heart attack. These may include:  ¨ Chest pain or pressure, or a strange feeling in the chest.  ¨ Sweating. ¨ Shortness of breath. ¨ Nausea or vomiting. ¨ Pain, pressure, or a strange feeling in the back, neck, jaw, or upper belly or in one or both shoulders or arms. ¨ Lightheadedness or sudden weakness. ¨ A fast or irregular heartbeat. After you call 911, the  may tell you to chew 1 adult-strength or 2 to 4 low-dose aspirin. Wait for an ambulance. Do not try to drive yourself.    ?Call your doctor now or seek immediate medical care if:  ? · Your shortness of breath gets worse or you start to wheeze. Wheezing is a high-pitched sound when you breathe. ? · You wake up at night out of breath or have to prop your head up on several pillows to breathe. ? · You are short of breath after only light activity or while at rest.   ? Watch closely for changes in your health, and be sure to contact your doctor if:  ? · You do not get better over the next 1 to 2 days. Where can you learn more? Go to http://carmina-mamta.info/. Enter S780 in the search box to learn more about \"Shortness of Breath: Care Instructions. \"  Current as of: May 12, 2017  Content Version: 11.4  © 8217-0224 [employer]. Care instructions adapted under license by [employer] (which disclaims liability or warranty for this information). If you have questions about a medical condition or this instruction, always ask your healthcare professional. [address] any warranty or liability for your use of this information.

## 2018-11-16 RX ORDER — LOPERAMIDE HYDROCHLORIDE 2 MG/1
CAPSULE ORAL
Qty: 120 CAP | Refills: 5 | OUTPATIENT
Start: 2018-11-16

## 2018-11-30 ENCOUNTER — OFFICE VISIT (OUTPATIENT)
Dept: INTERNAL MEDICINE CLINIC | Age: 28
End: 2018-11-30

## 2018-11-30 VITALS
TEMPERATURE: 98.4 F | OXYGEN SATURATION: 96 % | DIASTOLIC BLOOD PRESSURE: 50 MMHG | HEART RATE: 76 BPM | HEIGHT: 60 IN | WEIGHT: 154.8 LBS | SYSTOLIC BLOOD PRESSURE: 103 MMHG | BODY MASS INDEX: 30.39 KG/M2 | RESPIRATION RATE: 18 BRPM

## 2018-11-30 DIAGNOSIS — K50.90 CROHN'S DISEASE WITHOUT COMPLICATION, UNSPECIFIED GASTROINTESTINAL TRACT LOCATION (HCC): ICD-10-CM

## 2018-11-30 DIAGNOSIS — F32.9 REACTIVE DEPRESSION: Primary | ICD-10-CM

## 2018-11-30 RX ORDER — LOPERAMIDE HYDROCHLORIDE 2 MG/1
CAPSULE ORAL
Qty: 120 CAP | Refills: 5 | Status: SHIPPED | OUTPATIENT
Start: 2018-11-30 | End: 2019-12-07 | Stop reason: SDUPTHER

## 2018-11-30 RX ORDER — SERTRALINE HYDROCHLORIDE 50 MG/1
50 TABLET, FILM COATED ORAL DAILY
Qty: 30 TAB | Refills: 5 | Status: SHIPPED | OUTPATIENT
Start: 2018-11-30 | End: 2019-12-06

## 2018-11-30 NOTE — PROGRESS NOTES
HISTORY OF PRESENT ILLNESS  Jasmin Gama is a 25 y.o. female. Chief Complaint   Patient presents with    Medication Refill    Dizziness     for about 1 month     Health Maintenance Due   Topic Date Due    DTaP/Tdap/Td series (1 - Tdap) 06/11/2009    PAP AKA CERVICAL CYTOLOGY  07/01/2017    OB 3RD TRIMESTER TDAP  03/07/2018    Influenza Age 5 to Adult  08/01/2018     Influenza: up to date. 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 Wt Readings from Last 3 Encounters:   11/30/18 154 lb 12.8 oz (70.2 kg)   06/14/18 160 lb (72.6 kg)   03/29/18 163 lb (73.9 kg)     Not currently working on postpartum weight loss due to stress level. 11 month old baby boy with sleep regression recently. Pt is living with her parents.  from  (bipolar d/o). Pt is feeling safe at home, but angry and anxious. No SI/HI. + Hx depression. Not currently feeling depressed, but notes that anxiety is distressing daily. LMP: started last Sunday x 1 day. Pregnancy test at home negative. Hx crohns. Needs immodium refilled. Will be seeing GI in Jan 2019. Review of Systems   HENT: Negative for congestion, ear pain, hearing loss and tinnitus. Respiratory: Negative for shortness of breath. Cardiovascular: Negative for chest pain and palpitations. Neurological: Positive for dizziness. Negative for loss of consciousness, weakness and headaches. Psychiatric/Behavioral: Negative for depression, substance abuse and suicidal ideas. The patient is nervous/anxious and has insomnia. Physical Exam   Constitutional: She is oriented to person, place, and time. She appears well-developed and well-nourished. No distress. HENT:   Head: Normocephalic and atraumatic. Right Ear: External ear normal.   Left Ear: External ear normal.   Nose: Nose normal.   Mouth/Throat: Oropharynx is clear and moist.   Neck: Neck supple. No JVD present.    Cardiovascular: Normal rate, regular rhythm and normal heart sounds. Pulmonary/Chest: Effort normal and breath sounds normal. No respiratory distress. Musculoskeletal: She exhibits no edema. Neurological: She is alert and oriented to person, place, and time. Skin: Skin is warm and dry. Psychiatric: Her behavior is normal. Judgment and thought content normal.   Slightly tearful. Recovers easily. Nursing note and vitals reviewed. ASSESSMENT and PLAN    ICD-10-CM ICD-9-CM    1. Reactive depression F32.9 300.4 Start sertraline (ZOLOFT) 50 mg tablet      CBC WITH AUTOMATED DIFF      METABOLIC PANEL, COMPREHENSIVE      TSH RFX ON ABNORMAL TO FREE T4      REFERRAL TO SOCIAL WORK   2. Crohn's disease without complication, unspecified gastrointestinal tract location ([facility] K50.90 555.9 Follow up with GI as planned in 2019.    loperamide (IMODIUM) 2 mg capsule

## 2018-11-30 NOTE — PROGRESS NOTES
1. Have you been to the ER, urgent care clinic since your last visit? Hospitalized since your last visit? No    2. Have you seen or consulted any other health care providers outside of the [address] [place] [address] since your last visit? Include any pap smears or colon screening. No   Chief Complaint   Patient presents with    Medication Refill    Dizziness     for about 1 month     Not fasting    Abuse Screening Questionnaire 3/31/2017   Do you ever feel afraid of your partner? N   Are you in a relationship with someone who physically or mentally threatens you? N   Is it safe for you to go home?  Cristy Lees

## 2018-12-01 LAB
ALBUMIN SERPL-MCNC: 4.6 G/DL (ref 3.5–5.5)
ALBUMIN/GLOB SERPL: 1.4 {RATIO} (ref 1.2–2.2)
ALP SERPL-CCNC: 107 IU/L (ref 39–117)
ALT SERPL-CCNC: 38 IU/L (ref 0–32)
AST SERPL-CCNC: 33 IU/L (ref 0–40)
BASOPHILS # BLD AUTO: 0.1 X10E3/UL (ref 0–0.2)
BASOPHILS NFR BLD AUTO: 1 %
BILIRUB SERPL-MCNC: 0.3 MG/DL (ref 0–1.2)
BUN SERPL-MCNC: 10 MG/DL (ref 6–20)
BUN/CREAT SERPL: 15 (ref 9–23)
CALCIUM SERPL-MCNC: 9.6 MG/DL (ref 8.7–10.2)
CHLORIDE SERPL-SCNC: 102 MMOL/L (ref 96–106)
CO2 SERPL-SCNC: 26 MMOL/L (ref 20–29)
CREAT SERPL-MCNC: 0.67 MG/DL (ref 0.57–1)
EOSINOPHIL # BLD AUTO: 0.1 X10E3/UL (ref 0–0.4)
EOSINOPHIL NFR BLD AUTO: 1 %
ERYTHROCYTE [DISTWIDTH] IN BLOOD BY AUTOMATED COUNT: 16.5 % (ref 12.3–15.4)
GLOBULIN SER CALC-MCNC: 3.2 G/DL (ref 1.5–4.5)
GLUCOSE SERPL-MCNC: 95 MG/DL (ref 65–99)
HCT VFR BLD AUTO: 38.6 % (ref 34–46.6)
HGB BLD-MCNC: 12.6 G/DL (ref 11.1–15.9)
IMM GRANULOCYTES # BLD: 0 X10E3/UL (ref 0–0.1)
IMM GRANULOCYTES NFR BLD: 0 %
LYMPHOCYTES # BLD AUTO: 2.3 X10E3/UL (ref 0.7–3.1)
LYMPHOCYTES NFR BLD AUTO: 27 %
MCH RBC QN AUTO: 25.5 PG (ref 26.6–33)
MCHC RBC AUTO-ENTMCNC: 32.6 G/DL (ref 31.5–35.7)
MCV RBC AUTO: 78 FL (ref 79–97)
MONOCYTES # BLD AUTO: 0.6 X10E3/UL (ref 0.1–0.9)
MONOCYTES NFR BLD AUTO: 7 %
NEUTROPHILS # BLD AUTO: 5.5 X10E3/UL (ref 1.4–7)
NEUTROPHILS NFR BLD AUTO: 64 %
PLATELET # BLD AUTO: 315 X10E3/UL (ref 150–379)
POTASSIUM SERPL-SCNC: 4.9 MMOL/L (ref 3.5–5.2)
PROT SERPL-MCNC: 7.8 G/DL (ref 6–8.5)
RBC # BLD AUTO: 4.94 X10E6/UL (ref 3.77–5.28)
SODIUM SERPL-SCNC: 141 MMOL/L (ref 134–144)
TSH SERPL DL<=0.005 MIU/L-ACNC: 0.94 UIU/ML (ref 0.45–4.5)
WBC # BLD AUTO: 8.6 X10E3/UL (ref 3.4–10.8)

## 2019-01-09 ENCOUNTER — OFFICE VISIT (OUTPATIENT)
Dept: INTERNAL MEDICINE CLINIC | Age: 29
End: 2019-01-09

## 2019-01-09 VITALS
RESPIRATION RATE: 18 BRPM | BODY MASS INDEX: 30.51 KG/M2 | HEIGHT: 60 IN | HEART RATE: 72 BPM | SYSTOLIC BLOOD PRESSURE: 106 MMHG | DIASTOLIC BLOOD PRESSURE: 52 MMHG | TEMPERATURE: 98.3 F | OXYGEN SATURATION: 98 % | WEIGHT: 155.4 LBS

## 2019-01-09 DIAGNOSIS — F32.9 REACTIVE DEPRESSION: ICD-10-CM

## 2019-01-09 DIAGNOSIS — Z00.00 ANNUAL PHYSICAL EXAM: Primary | ICD-10-CM

## 2019-01-09 DIAGNOSIS — Z3A.01 LESS THAN 8 WEEKS GESTATION OF PREGNANCY: ICD-10-CM

## 2019-01-09 DIAGNOSIS — K50.90 CROHN'S DISEASE WITHOUT COMPLICATION, UNSPECIFIED GASTROINTESTINAL TRACT LOCATION (HCC): ICD-10-CM

## 2019-01-09 NOTE — PROGRESS NOTES
Paula Sams is a 25 y.o. female  Chief Complaint   Patient presents with    Medication Evaluation     follow up      Health Maintenance Due   Topic Date Due    DTaP/Tdap/Td series (1 - Tdap) 06/11/2009    PAP AKA CERVICAL CYTOLOGY  07/01/2017    OB 3RD TRIMESTER TDAP  03/07/2018    Influenza Age 9 to Adult  08/01/2018     Influenza: up to date. HPI  CE today. Found out yesterday: Currently pregnant with baby #2. Son is now 10 months old.  has BPD and is currently  from him and living with her parents. Pt has not yet told her parents. She feels that they will be concerned but supportive. Depression/Anx/Social stress/Marital Stress - Started Zoloft 50 mg at last visit 11/30. Felt that this was helping. Did not take it today because of new dx pregnancy. No hx SI and pt would like to try stopping Zoloft for at least the first trimester of this pregnancy. Cyst R breast x 3-4 months. Was able to get discharge out when became painful. Now not painful. Would like this checked. Hx breast reduction surg and cyst was on scar line. Seeing GI for follow up IBD this month. Review of Systems   Constitutional: Negative for fever and weight loss. HENT: Negative for congestion, hearing loss and sore throat. Eyes: Negative for blurred vision. Respiratory: Negative for cough and shortness of breath. Cardiovascular: Negative for chest pain, palpitations and leg swelling. Gastrointestinal: Positive for diarrhea and nausea. Negative for abdominal pain, blood in stool, constipation, heartburn, melena and vomiting. Genitourinary: Negative for dysuria, frequency, hematuria and urgency. Musculoskeletal: Positive for back pain. Negative for joint pain and neck pain. Neurological: Negative for dizziness, seizures, loss of consciousness, weakness and headaches. Endo/Heme/Allergies: Negative for environmental allergies.    Psychiatric/Behavioral: Negative for depression and substance abuse. The patient is not nervous/anxious and does not have insomnia. Physical Exam   Constitutional: She is oriented to person, place, and time. She appears well-developed and well-nourished. No distress. HENT:   Head: Normocephalic and atraumatic. Right Ear: External ear normal.   Left Ear: External ear normal.   Nose: Nose normal.   Mouth/Throat: Oropharynx is clear and moist.   Eyes: Conjunctivae are normal.   Neck: Neck supple. No JVD present. No thyromegaly present. Cardiovascular: Normal rate, regular rhythm and normal heart sounds. Pulmonary/Chest: Effort normal and breath sounds normal. No respiratory distress. Abdominal: Soft. Bowel sounds are normal. She exhibits no distension and no mass. There is no tenderness. There is no rebound and no guarding. Musculoskeletal: She exhibits no edema. Lymphadenopathy:     She has no cervical adenopathy. Neurological: She is alert and oriented to person, place, and time. Skin: Skin is warm and dry. Dark, healing area noted R lower lateral breast. No mass. No drainage/erythema. Psychiatric: She has a normal mood and affect. Her behavior is normal. Judgment and thought content normal.   Nursing note and vitals reviewed. Diagnoses and all orders for this visit:    1. Annual physical exam - fasting labs deferred to OB. 2. Crohn's disease without complication, unspecified gastrointestinal tract location [facility]) - continue routine follow ups with GI    3. Reactive depression - stop Zoloft for now. Consult with OB re: risk vs benefits at first prenatal appointment. Monitor for SI/worsening depression. 4. Less than 8 weeks gestation of pregnancy - restart PNV. Assessed social support. Pt feels supported.

## 2019-01-09 NOTE — PROGRESS NOTES
1. Have you been to the ER, urgent care clinic since your last visit? Hospitalized since your last visit? No    2. Have you seen or consulted any other health care providers outside of the [address] [place] [address] since your last visit? Include any pap smears or colon screening. No  Chief Complaint   Patient presents with    Medication Evaluation     follow up     Not fasting    Abuse Screening Questionnaire 1/9/2019   Do you ever feel afraid of your partner? N   Are you in a relationship with someone who physically or mentally threatens you? N   Is it safe for you to go home?  Prince Sherwood

## 2019-12-06 ENCOUNTER — HOSPITAL ENCOUNTER (OUTPATIENT)
Dept: GENERAL RADIOLOGY | Age: 29
Discharge: HOME | End: 2019-12-06
Attending: PHYSICIAN ASSISTANT
Payer: COMMERCIAL

## 2019-12-06 ENCOUNTER — OFFICE VISIT (OUTPATIENT)
Dept: INTERNAL MEDICINE CLINIC | Age: 29
End: 2019-12-06

## 2019-12-06 ENCOUNTER — HOSPITAL ENCOUNTER (OUTPATIENT)
Dept: LAB | Age: 29
Discharge: HOME | End: 2019-12-06
Payer: COMMERCIAL

## 2019-12-06 ENCOUNTER — APPOINTMENT (OUTPATIENT)
Dept: GENERAL RADIOLOGY | Age: 29
End: 2019-12-06
Attending: PHYSICIAN ASSISTANT
Payer: COMMERCIAL

## 2019-12-06 VITALS
BODY MASS INDEX: 32.98 KG/M2 | HEIGHT: 60 IN | RESPIRATION RATE: 16 BRPM | SYSTOLIC BLOOD PRESSURE: 118 MMHG | DIASTOLIC BLOOD PRESSURE: 45 MMHG | WEIGHT: 168 LBS | OXYGEN SATURATION: 97 % | TEMPERATURE: 98.4 F | HEART RATE: 85 BPM

## 2019-12-06 DIAGNOSIS — M79.2 NEURALGIA: Primary | ICD-10-CM

## 2019-12-06 DIAGNOSIS — M79.672 LEFT FOOT PAIN: ICD-10-CM

## 2019-12-06 DIAGNOSIS — M79.2 NEURALGIA: ICD-10-CM

## 2019-12-06 PROBLEM — Z3A.01 LESS THAN 8 WEEKS GESTATION OF PREGNANCY: Status: RESOLVED | Noted: 2019-01-09 | Resolved: 2019-12-06

## 2019-12-06 LAB
ALBUMIN SERPL-MCNC: 4.2 G/DL (ref 3.5–5)
ALBUMIN/GLOB SERPL: 1.1 {RATIO} (ref 1.1–2.2)
ALP SERPL-CCNC: 133 U/L (ref 45–117)
ALT SERPL-CCNC: 120 U/L (ref 12–78)
ANION GAP SERPL CALC-SCNC: 5 MMOL/L (ref 5–15)
AST SERPL-CCNC: 79 U/L (ref 15–37)
BASOPHILS # BLD: 0.1 K/UL (ref 0–0.1)
BASOPHILS NFR BLD: 1 % (ref 0–1)
BILIRUB SERPL-MCNC: 0.3 MG/DL (ref 0.2–1)
BUN SERPL-MCNC: 13 MG/DL (ref 6–20)
BUN/CREAT SERPL: 19 (ref 12–20)
CALCIUM SERPL-MCNC: 9.3 MG/DL (ref 8.5–10.1)
CHLORIDE SERPL-SCNC: 106 MMOL/L (ref 97–108)
CO2 SERPL-SCNC: 28 MMOL/L (ref 21–32)
CREAT SERPL-MCNC: 0.68 MG/DL (ref 0.55–1.02)
DIFFERENTIAL METHOD BLD: ABNORMAL
EOSINOPHIL # BLD: 0.1 K/UL (ref 0–0.4)
EOSINOPHIL NFR BLD: 1 % (ref 0–7)
ERYTHROCYTE [DISTWIDTH] IN BLOOD BY AUTOMATED COUNT: 15.5 % (ref 11.5–14.5)
GLOBULIN SER CALC-MCNC: 3.9 G/DL (ref 2–4)
GLUCOSE SERPL-MCNC: 98 MG/DL (ref 65–100)
HCT VFR BLD AUTO: 37.6 % (ref 35–47)
HGB BLD-MCNC: 11.8 G/DL (ref 11.5–16)
IMM GRANULOCYTES # BLD AUTO: 0 K/UL (ref 0–0.04)
IMM GRANULOCYTES NFR BLD AUTO: 0 % (ref 0–0.5)
LYMPHOCYTES # BLD: 2.1 K/UL (ref 0.8–3.5)
LYMPHOCYTES NFR BLD: 29 % (ref 12–49)
MCH RBC QN AUTO: 25.1 PG (ref 26–34)
MCHC RBC AUTO-ENTMCNC: 31.4 G/DL (ref 30–36.5)
MCV RBC AUTO: 79.8 FL (ref 80–99)
MONOCYTES # BLD: 0.6 K/UL (ref 0–1)
MONOCYTES NFR BLD: 8 % (ref 5–13)
NEUTS SEG # BLD: 4.3 K/UL (ref 1.8–8)
NEUTS SEG NFR BLD: 61 % (ref 32–75)
NRBC # BLD: 0 K/UL (ref 0–0.01)
NRBC BLD-RTO: 0 PER 100 WBC
PLATELET # BLD AUTO: 276 K/UL (ref 150–400)
PMV BLD AUTO: 10.7 FL (ref 8.9–12.9)
POTASSIUM SERPL-SCNC: 4.2 MMOL/L (ref 3.5–5.1)
PROT SERPL-MCNC: 8.1 G/DL (ref 6.4–8.2)
RBC # BLD AUTO: 4.71 M/UL (ref 3.8–5.2)
SODIUM SERPL-SCNC: 139 MMOL/L (ref 136–145)
TSH SERPL DL<=0.05 MIU/L-ACNC: 0.64 UIU/ML (ref 0.36–3.74)
VIT B12 SERPL-MCNC: 520 PG/ML (ref 193–986)
WBC # BLD AUTO: 7.1 K/UL (ref 3.6–11)

## 2019-12-06 PROCEDURE — 73630 X-RAY EXAM OF FOOT: CPT

## 2019-12-06 RX ORDER — IBUPROFEN 800 MG/1
800 TABLET ORAL 2 TIMES DAILY WITH MEALS
Qty: 40 TAB | Refills: 0 | Status: SHIPPED | OUTPATIENT
Start: 2019-12-06 | End: 2020-11-06 | Stop reason: ALTCHOICE

## 2019-12-06 RX ORDER — SERTRALINE HYDROCHLORIDE 100 MG/1
TABLET, FILM COATED ORAL
Refills: 1 | COMMUNITY
Start: 2019-11-08 | End: 2020-11-06 | Stop reason: ALTCHOICE

## 2019-12-06 NOTE — PROGRESS NOTES
1. Have you been to the ER, urgent care clinic since your last visit? Hospitalized since your last visit? No    2. Have you seen or consulted any other health care providers outside of the [address] [place] [address] since your last visit? Include any pap smears or colon screening.  No   Chief Complaint   Patient presents with    Toe Pain     left pinky toe started last night     Not fasting

## 2019-12-06 NOTE — PATIENT INSTRUCTIONS
*PLEASE DO NOT GO TO [facility]AG[facility]. This is an Benjy Papoula 1998 and it will take more than 24 hours for me to get the report from this location. *    [facility] X-ray locations:     [employer]: [address] [facility], [address]: [address]: [place], [place]    [facility] End MRI: [id number] [facility], [address] at [facility]: [address] [facility], [address] [facility] at [facility] brain: [address] [facility], [address], [facility], [address] [place] [address] [address]: [address] [facility], [address] [facility]: [address] [facility].  [facility]: [address] [address], [address]: [address] [facility], [address]

## 2019-12-06 NOTE — PROGRESS NOTES
Simin Bautista is a 30 y.o. female  Chief Complaint   Patient presents with    Toe Pain     left pinky toe started last night     Visit Vitals  /45 (BP 1 Location: Left arm, BP Patient Position: At rest)   Pulse 85   Temp 98.4 °F (36.9 °C) (Oral)   Resp 16   Ht 5' (1.524 m)   Wt 168 lb (76.2 kg)   SpO2 97%   BMI 32.81 kg/m²      Health Maintenance Due   Topic Date Due    DTaP/Tdap/Td series (1 - Tdap) 06/11/1999    PAP AKA CERVICAL CYTOLOGY  07/01/2017    OB 3RD TRIMESTER TDAP  03/07/2018    Influenza Age 9 to Adult  08/01/2019       HPI  Was walking past couch and stubbed little toe on edge of couch leg. Clayton Aas a \"crack\". Foot is painful. Took ibuprofen - last dose last night. Used Ice. B hands numb frequently. Suspects carpal tunnel syndrome. Has not tried bracing. Started after daughter was born 4 months ago. Daughter is healthy. Pt is pumping once daily. Daughter is otherwise off of breast milk. Hx Crohns. Not UTD with GI, but planning to schedule. No recent flare. Review of Systems   Constitutional: Negative for fever. Respiratory: Negative for shortness of breath. Cardiovascular: Negative for chest pain and palpitations. Musculoskeletal: Negative for falls. Neurological: Positive for tingling. Negative for dizziness, focal weakness, loss of consciousness and weakness. Physical Exam  Musculoskeletal:      Comments:  strength normal and equal B hands. L lateral foot bruised and slightly swollen. ROM 5th toe limited by pain. Skin:     General: Skin is warm and dry. Neurological:      Comments: Tinel's and Phalen's signs positive B wrists. Diagnoses and all orders for this visit:    1. Neuralgia  -     CBC WITH AUTOMATED DIFF; Future  -     METABOLIC PANEL, COMPREHENSIVE; Future  -     VITAMIN B12; Future  -     TSH 3RD GENERATION; Future  Pt declines referral to hand specialist for now. Will let me know if she changes her mind.      2. Left foot pain  - XR FOOT LT MIN 3 V; Future  -     ibuprofen (MOTRIN) 800 mg tablet; Take 1 Tab by mouth two (2) times daily (with meals). Supportive shoes.

## 2019-12-07 DIAGNOSIS — K50.90 CROHN'S DISEASE WITHOUT COMPLICATION, UNSPECIFIED GASTROINTESTINAL TRACT LOCATION (HCC): ICD-10-CM

## 2019-12-07 RX ORDER — LOPERAMIDE HYDROCHLORIDE 2 MG/1
CAPSULE ORAL
Qty: 120 CAP | Refills: 1 | Status: SHIPPED | OUTPATIENT
Start: 2019-12-07 | End: 2020-12-08

## 2019-12-08 NOTE — PROGRESS NOTES
Bandar Cai,   It looks like you have a tiny fracture at the base of your pinky toe. The orthopedic guidelines say that if you are reasonably comfortable, you can just buddy-tape your toe to the next toe and wear a supportive shoe (like a sneaker) for 4 weeks. If your pain has gotten worse, it would be very reasonable to see Ortho on Call or a Orthopedic Foot specialist. How are you feeling?    Jen Crook

## 2019-12-09 NOTE — PROGRESS NOTES
Berhane Tubbs,   Your liver enzymes are very high. I know that you've been taking ibuprofen since this foot injury, but were you taking Tylenol or Ibuprofen regularly prior to the injury? Do you drink alcohol regularly? It appears that you've had elevated liver enzymes in the past as well, just not quite this high. Have you ever had an ultrasound of your liver? Or Hepatitis testing? I don't see this in the [facility] system. We should order this if if hasn't ever been done. Let me know. Blood count, Thyroid, kidney function, and electrolytes are all normal/acceptable. Good!      Vitamin B level is normal.

## 2019-12-10 DIAGNOSIS — R74.8 ELEVATED LIVER ENZYMES: Primary | ICD-10-CM

## 2020-01-09 ENCOUNTER — TELEPHONE (OUTPATIENT)
Dept: INTERNAL MEDICINE CLINIC | Age: 30
End: 2020-01-09

## 2020-01-09 DIAGNOSIS — R74.8 ELEVATED LIVER ENZYMES: Primary | ICD-10-CM

## 2020-01-09 LAB
ALBUMIN SERPL-MCNC: 4.7 G/DL (ref 3.5–5.5)
ALP SERPL-CCNC: 114 IU/L (ref 39–117)
ALT SERPL-CCNC: 116 IU/L (ref 0–32)
AST SERPL-CCNC: 77 IU/L (ref 0–40)
BILIRUB DIRECT SERPL-MCNC: 0.1 MG/DL (ref 0–0.4)
BILIRUB SERPL-MCNC: 0.4 MG/DL (ref 0–1.2)
CHOLEST SERPL-MCNC: 174 MG/DL (ref 100–199)
HBV SURFACE AG SERPL QL IA: NEGATIVE
HCV AB S/CO SERPL IA: <0.1 S/CO RATIO (ref 0–0.9)
HDLC SERPL-MCNC: 48 MG/DL
INTERPRETATION, 910389: NORMAL
LDLC SERPL CALC-MCNC: 97 MG/DL (ref 0–99)
PROT SERPL-MCNC: 7.8 G/DL (ref 6–8.5)
TRIGL SERPL-MCNC: 144 MG/DL (ref 0–149)
VLDLC SERPL CALC-MCNC: 29 MG/DL (ref 5–40)

## 2020-01-09 NOTE — PROGRESS NOTES
Cholesterol is normal.   Hepatitis testing is negative (good). Liver enzymes remain high. Let's order that ultrasound. I'll place an order in the system now.

## 2020-01-09 NOTE — TELEPHONE ENCOUNTER
Please inform pt that her liver enzymes remain high, so I'd like to order an ultrasound.    Cholesterol is normal and Hepatitis testing is negative (normal)

## 2020-01-20 ENCOUNTER — HOSPITAL ENCOUNTER (OUTPATIENT)
Dept: ULTRASOUND IMAGING | Age: 30
Discharge: HOME | End: 2020-01-20
Attending: PHYSICIAN ASSISTANT
Payer: COMMERCIAL

## 2020-01-20 DIAGNOSIS — R74.8 ELEVATED LIVER ENZYMES: ICD-10-CM

## 2020-01-20 PROBLEM — K76.0 FATTY LIVER: Status: ACTIVE | Noted: 2020-01-20

## 2020-01-20 PROCEDURE — 76705 ECHO EXAM OF ABDOMEN: CPT

## 2020-01-20 NOTE — PROGRESS NOTES
It looks like you do have fatty liver, which is reassuring - no mass in your liver. Eat a low fat diet, and let's recheck your liver enzymes in 2-3 months.

## 2020-11-06 ENCOUNTER — OFFICE VISIT (OUTPATIENT)
Dept: INTERNAL MEDICINE CLINIC | Age: 30
End: 2020-11-06
Payer: COMMERCIAL

## 2020-11-06 VITALS
SYSTOLIC BLOOD PRESSURE: 112 MMHG | OXYGEN SATURATION: 99 % | TEMPERATURE: 95 F | BODY MASS INDEX: 32.91 KG/M2 | RESPIRATION RATE: 16 BRPM | DIASTOLIC BLOOD PRESSURE: 65 MMHG | HEART RATE: 90 BPM | WEIGHT: 168.5 LBS

## 2020-11-06 DIAGNOSIS — R73.02 GLUCOSE INTOLERANCE (IMPAIRED GLUCOSE TOLERANCE): ICD-10-CM

## 2020-11-06 DIAGNOSIS — K76.0 FATTY LIVER: ICD-10-CM

## 2020-11-06 DIAGNOSIS — Z23 NEEDS FLU SHOT: Primary | ICD-10-CM

## 2020-11-06 DIAGNOSIS — F41.9 ANXIETY: ICD-10-CM

## 2020-11-06 DIAGNOSIS — R53.83 FATIGUE, UNSPECIFIED TYPE: ICD-10-CM

## 2020-11-06 DIAGNOSIS — K50.90 CROHN'S DISEASE WITHOUT COMPLICATION, UNSPECIFIED GASTROINTESTINAL TRACT LOCATION (HCC): ICD-10-CM

## 2020-11-06 LAB
ALBUMIN SERPL-MCNC: 3.8 G/DL (ref 3.5–5)
ALBUMIN/GLOB SERPL: 0.9 {RATIO} (ref 1.1–2.2)
ALP SERPL-CCNC: 92 U/L (ref 45–117)
ALT SERPL-CCNC: 83 U/L (ref 12–78)
ANION GAP SERPL CALC-SCNC: 6 MMOL/L (ref 5–15)
AST SERPL-CCNC: 67 U/L (ref 15–37)
BASOPHILS # BLD: 0.1 K/UL (ref 0–0.1)
BASOPHILS NFR BLD: 1 % (ref 0–1)
BILIRUB SERPL-MCNC: 0.3 MG/DL (ref 0.2–1)
BUN SERPL-MCNC: 10 MG/DL (ref 6–20)
BUN/CREAT SERPL: 15 (ref 12–20)
CALCIUM SERPL-MCNC: 9.3 MG/DL (ref 8.5–10.1)
CHLORIDE SERPL-SCNC: 109 MMOL/L (ref 97–108)
CHOLEST SERPL-MCNC: 167 MG/DL
CO2 SERPL-SCNC: 24 MMOL/L (ref 21–32)
CREAT SERPL-MCNC: 0.66 MG/DL (ref 0.55–1.02)
DIFFERENTIAL METHOD BLD: ABNORMAL
EOSINOPHIL # BLD: 0.1 K/UL (ref 0–0.4)
EOSINOPHIL NFR BLD: 1 % (ref 0–7)
ERYTHROCYTE [DISTWIDTH] IN BLOOD BY AUTOMATED COUNT: 13.8 % (ref 11.5–14.5)
EST. AVERAGE GLUCOSE BLD GHB EST-MCNC: 123 MG/DL
GLOBULIN SER CALC-MCNC: 4.2 G/DL (ref 2–4)
GLUCOSE SERPL-MCNC: 105 MG/DL (ref 65–100)
HBA1C MFR BLD: 5.9 % (ref 4–5.6)
HCT VFR BLD AUTO: 38.8 % (ref 35–47)
HDLC SERPL-MCNC: 49 MG/DL
HDLC SERPL: 3.4 {RATIO} (ref 0–5)
HGB BLD-MCNC: 12.4 G/DL (ref 11.5–16)
IMM GRANULOCYTES # BLD AUTO: 0 K/UL (ref 0–0.04)
IMM GRANULOCYTES NFR BLD AUTO: 0 % (ref 0–0.5)
LDLC SERPL CALC-MCNC: 92 MG/DL (ref 0–100)
LIPID PROFILE,FLP: NORMAL
LYMPHOCYTES # BLD: 2 K/UL (ref 0.8–3.5)
LYMPHOCYTES NFR BLD: 26 % (ref 12–49)
MCH RBC QN AUTO: 25.7 PG (ref 26–34)
MCHC RBC AUTO-ENTMCNC: 32 G/DL (ref 30–36.5)
MCV RBC AUTO: 80.5 FL (ref 80–99)
MONOCYTES # BLD: 0.5 K/UL (ref 0–1)
MONOCYTES NFR BLD: 7 % (ref 5–13)
NEUTS SEG # BLD: 5 K/UL (ref 1.8–8)
NEUTS SEG NFR BLD: 65 % (ref 32–75)
NRBC # BLD: 0 K/UL (ref 0–0.01)
NRBC BLD-RTO: 0 PER 100 WBC
PLATELET # BLD AUTO: 335 K/UL (ref 150–400)
PMV BLD AUTO: 10.3 FL (ref 8.9–12.9)
POTASSIUM SERPL-SCNC: 4.3 MMOL/L (ref 3.5–5.1)
PROT SERPL-MCNC: 8 G/DL (ref 6.4–8.2)
RBC # BLD AUTO: 4.82 M/UL (ref 3.8–5.2)
SODIUM SERPL-SCNC: 139 MMOL/L (ref 136–145)
TRIGL SERPL-MCNC: 130 MG/DL (ref ?–150)
TSH SERPL DL<=0.05 MIU/L-ACNC: 0.89 UIU/ML (ref 0.36–3.74)
VLDLC SERPL CALC-MCNC: 26 MG/DL
WBC # BLD AUTO: 7.6 K/UL (ref 3.6–11)

## 2020-11-06 PROCEDURE — 90686 IIV4 VACC NO PRSV 0.5 ML IM: CPT | Performed by: PHYSICIAN ASSISTANT

## 2020-11-06 PROCEDURE — 99214 OFFICE O/P EST MOD 30 MIN: CPT | Performed by: PHYSICIAN ASSISTANT

## 2020-11-06 PROCEDURE — 90471 IMMUNIZATION ADMIN: CPT | Performed by: PHYSICIAN ASSISTANT

## 2020-11-06 RX ORDER — BUPROPION HYDROCHLORIDE 150 MG/1
150 TABLET ORAL
Qty: 30 TAB | Refills: 0 | Status: SHIPPED | OUTPATIENT
Start: 2020-11-06 | End: 2020-12-08

## 2020-11-06 NOTE — PROGRESS NOTES
Angel Mckeon is a 26 y.o. female  Chief Complaint   Patient presents with    Other     neruglia     Immunization/Injection     flu shot     Visit Vitals  /65 (BP 1 Location: Left arm, BP Patient Position: Sitting)   Pulse 90   Temp (!) 95 °F (35 °C) (Temporal)   Resp 16   Wt 168 lb 8 oz (76.4 kg)   SpO2 99%   Breastfeeding Unknown   BMI 32.91 kg/m²      Health Maintenance Due   Topic Date Due    PAP AKA CERVICAL CYTOLOGY  07/01/2017    Flu Vaccine (1) 09/01/2020       HPI    Fatigue \"entire life\" - worse recently   7 hours of sleep/night   1.5-2 hours napping during the day. Getting up at least 1x/night with the kids most nights x 30 minutes. Healthy 1 and 1 yo children. Planning to discuss this with the pediatrician next week. Snoring regularly. Feeling Anxious regularly. Hx Crohns - followed by GI. No recent flare. ROS  Review of Systems   Constitutional: Positive for malaise/fatigue. Negative for fever and weight loss. Respiratory: Negative for shortness of breath. Cardiovascular: Negative for chest pain and palpitations. Neurological: Negative for dizziness, loss of consciousness and weakness. Psychiatric/Behavioral: Negative for depression. The patient is nervous/anxious and has insomnia. EXAM  Physical Exam  Vitals signs and nursing note reviewed. Constitutional:       General: She is not in acute distress. Appearance: She is well-developed. HENT:      Head: Normocephalic and atraumatic. Neck:      Musculoskeletal: Neck supple. Vascular: No JVD. Cardiovascular:      Rate and Rhythm: Normal rate and regular rhythm. Heart sounds: Normal heart sounds. Pulmonary:      Effort: Pulmonary effort is normal. No respiratory distress. Breath sounds: Normal breath sounds. Skin:     General: Skin is warm and dry. Neurological:      Mental Status: She is alert and oriented to person, place, and time.    Psychiatric:         Mood and Affect: Mood normal.         Behavior: Behavior normal.         Thought Content: Thought content normal.         Judgment: Judgment normal.         ASSESSMENT/PLAN  1. Needs flu shot  - INFLUENZA VIRUS VAC QUAD,SPLIT,PRESV FREE SYRINGE IM    2. Anxiety  Start buPROPion XL (WELLBUTRIN XL) 150 mg tablet; Take 1 Tab by mouth every morning. Dispense: 30 Tab; Refill: 0    3. Fatigue, unspecified type  - SLEEP MEDICINE REFERRAL  - CBC WITH AUTOMATED DIFF; Future  - TSH 3RD GENERATION; Future  - METABOLIC PANEL, COMPREHENSIVE; Future  - buPROPion XL (WELLBUTRIN XL) 150 mg tablet; Take 1 Tab by mouth every morning. Dispense: 30 Tab; Refill: 0    4. Fatty liver  - LIPID PANEL; Future    5. Glucose intolerance (impaired glucose tolerance)  - HEMOGLOBIN A1C WITH EAG; Future  Discussed diet/exercise     6. Crohn's disease without complication, unspecified gastrointestinal tract location [facility])  Followed by GI regularly.

## 2020-11-06 NOTE — PROGRESS NOTES
Chief Complaint   Patient presents with    Other     neruglia     Immunization/Injection     flu shot         1. Have you been to the ER, urgent care clinic since your last visit? Hospitalized since your last visit? No    2. Have you seen or consulted any other health care providers outside of the [address] [place] [address] since your last visit? Include any pap smears or colon screening.  No

## 2020-11-06 NOTE — PROGRESS NOTES
Your A1c shows that your glucose (sugar) remains in the \"prediabetic\" range. Decrease carbohydrates (bread, potatoes, rice, cereal, sugar, sweets), and increase cardio exercise. Try to avoid liquid calories (non-diet soda, gatorade, sweet tea, juice). Liver enzymes remain high, but are better than previously. Good! Blood Count, thyroid, kidney function, and electrolytes are all normal/acceptable. Good!

## 2020-11-06 NOTE — PATIENT INSTRUCTIONS
Vaccine Information Statement    Influenza (Flu) Vaccine (Inactivated or Recombinant): What You Need to Know    Many Vaccine Information Statements are available in [place] and other languages. See www.immunize.org/vis  Hojas de información sobre vacunas están disponibles en español y en muchos otros idiomas. Visite www.immunize.org/vis    1. Why get vaccinated? Influenza vaccine can prevent influenza (flu). Flu is a contagious disease that spreads around the [facility] every year, usually between October and May. Anyone can get the flu, but it is more dangerous for some people. Infants and young children, people 72years of age and older, pregnant women, and people with certain health conditions or a weakened immune system are at greatest risk of flu complications. Pneumonia, bronchitis, sinus infections and ear infections are examples of flu-related complications. If you have a medical condition, such as heart disease, cancer or diabetes, flu can make it worse. Flu can cause fever and chills, sore throat, muscle aches, fatigue, cough, headache, and runny or stuffy nose. Some people may have vomiting and diarrhea, though this is more common in children than adults. Each year thousands of people in the [facility] die from flu, and many more are hospitalized. Flu vaccine prevents millions of illnesses and flu-related visits to the doctor each year. 2. Influenza vaccines     CDC recommends everyone 10months of age and older get vaccinated every flu season. Children 6 months through 6years of age may need 2 doses during a single flu season. Everyone else needs only 1 dose each flu season. It takes about 2 weeks for protection to develop after vaccination. There are many flu viruses, and they are always changing. Each year a new flu vaccine is made to protect against three or four viruses that are likely to cause disease in the upcoming flu season.  Even when the vaccine doesnt exactly match these viruses, it may still provide some protection. Influenza vaccine does not cause flu. Influenza vaccine may be given at the same time as other vaccines. 3. Talk with your health care provider    Tell your vaccine provider if the person getting the vaccine:   Has had an allergic reaction after a previous dose of influenza vaccine, or has any severe, life-threatening allergies.  Has ever had Guillain-Barré Syndrome (also called GBS). In some cases, your health care provider may decide to postpone influenza vaccination to a future visit. People with minor illnesses, such as a cold, may be vaccinated. People who are moderately or severely ill should usually wait until they recover before getting influenza vaccine. Your health care provider can give you more information. 4. Risks of a reaction     Soreness, redness, and swelling where shot is given, fever, muscle aches, and headache can happen after influenza vaccine.  There may be a very small increased risk of Guillain-Barré Syndrome (GBS) after inactivated influenza vaccine (the flu shot). [facility] children who get the flu shot along with pneumococcal vaccine (PCV13), and/or DTaP vaccine at the same time might be slightly more likely to have a seizure caused by fever. Tell your health care provider if a child who is getting flu vaccine has ever had a seizure. People sometimes faint after medical procedures, including vaccination. Tell your provider if you feel dizzy or have vision changes or ringing in the ears. As with any medicine, there is a very remote chance of a vaccine causing a severe allergic reaction, other serious injury, or death. 5. What if there is a serious problem? An allergic reaction could occur after the vaccinated person leaves the clinic.  If you see signs of a severe allergic reaction (hives, swelling of the face and throat, difficulty breathing, a fast heartbeat, dizziness, or weakness), call 9-1-1 and get the person to the nearest hospital.    For other signs that concern you, call your health care provider. Adverse reactions should be reported to the Vaccine Adverse Event Reporting System (VAERS). Your health care provider will usually file this report, or you can do it yourself. Visit the VAERS website at www.vaers. [facility].gov or call 8-156.210.1870. VAERS is only for reporting reactions, and VAERS staff do not give medical advice. 6. The National Vaccine Injury Compensation Program    The [facility] Vaccine Injury Compensation Program (VICP) is a federal program that was created to compensate people who may have been injured by certain vaccines. Visit the VICP website at www.hrsa.gov/vaccinecompensation or call 5-533.210.4002 to learn about the program and about filing a claim. There is a time limit to file a claim for compensation. 7. How can I learn more?  Ask your health care provider.  Call your local or state health department.  Contact the Centers for Disease Control and Prevention (CDC):  - Call 3-807.931.8338 (1-800-CDC-INFO) or  - Visit CDCs influenza website at www.cdc.gov/flu    Vaccine Information Statement (Interim)  Inactivated Influenza Vaccine   8/15/2019  42 DENYS [place] [id number]-50   Department of Health and Human Services  Centers for Disease Control and Prevention    Office Use Only

## 2020-11-09 ENCOUNTER — PATIENT MESSAGE (OUTPATIENT)
Dept: INTERNAL MEDICINE CLINIC | Age: 30
End: 2020-11-09

## 2020-11-11 RX ORDER — ONDANSETRON 4 MG/1
4 TABLET, FILM COATED ORAL
Qty: 30 TAB | Refills: 0 | Status: SHIPPED | OUTPATIENT
Start: 2020-11-11 | End: 2021-04-14

## 2020-11-11 NOTE — TELEPHONE ENCOUNTER
From: Tanner Payton  To: Mariela Mares PA-C  Sent: 11/9/2020 5:57 AM EST  Subject: Non-Urgent Medical Question    Elysia Steiner the new medicine has been giving me a bit of nausea can you send the nausea meds to the pharmacy?

## 2020-12-05 DIAGNOSIS — K50.90 CROHN'S DISEASE WITHOUT COMPLICATION, UNSPECIFIED GASTROINTESTINAL TRACT LOCATION (HCC): ICD-10-CM

## 2020-12-05 DIAGNOSIS — R53.83 FATIGUE, UNSPECIFIED TYPE: ICD-10-CM

## 2020-12-05 DIAGNOSIS — F41.9 ANXIETY: ICD-10-CM

## 2020-12-08 ENCOUNTER — VIRTUAL VISIT (OUTPATIENT)
Dept: INTERNAL MEDICINE CLINIC | Age: 30
End: 2020-12-08
Payer: COMMERCIAL

## 2020-12-08 DIAGNOSIS — R53.83 FATIGUE, UNSPECIFIED TYPE: ICD-10-CM

## 2020-12-08 DIAGNOSIS — F41.9 ANXIETY: ICD-10-CM

## 2020-12-08 PROCEDURE — 99213 OFFICE O/P EST LOW 20 MIN: CPT | Performed by: PHYSICIAN ASSISTANT

## 2020-12-08 RX ORDER — LOPERAMIDE HYDROCHLORIDE 2 MG/1
CAPSULE ORAL
Qty: 120 CAP | Refills: 5 | Status: ON HOLD | OUTPATIENT
Start: 2020-12-08 | End: 2022-07-09

## 2020-12-08 RX ORDER — BUPROPION HYDROCHLORIDE 150 MG/1
TABLET ORAL
Qty: 30 TAB | Refills: 5 | Status: SHIPPED | OUTPATIENT
Start: 2020-12-08 | End: 2020-12-08 | Stop reason: SDUPTHER

## 2020-12-08 RX ORDER — BUPROPION HYDROCHLORIDE 150 MG/1
150 TABLET ORAL
Qty: 90 TAB | Refills: 1 | Status: ON HOLD | OUTPATIENT
Start: 2020-12-08 | End: 2022-07-09

## 2020-12-08 NOTE — PROGRESS NOTES
1. Have you been to the ER, urgent care clinic since your last visit? Hospitalized since your last visit? No    2. Have you seen or consulted any other health care providers outside of the [address] [place] [address] since your last visit? Include any pap smears or colon screening.  No     Chief Complaint   Patient presents with    Medication Evaluation     4 week follow up     Please send link to [phone]

## 2020-12-08 NOTE — PROGRESS NOTES
Lenin Santos is a 26 y.o. female who was seen by synchronous (real-time) audio-video technology on 12/8/2020    Consent: Lenin Santos, who was seen by synchronous (real-time) audio-video technology, and/or her healthcare decision maker, is aware that this patient-initiated, Telehealth encounter on 12/8/2020 is a billable service, with coverage as determined by her insurance carrier. She is aware that she may receive a bill and has provided verbal consent to proceed: YES    Assessment & Plan:   Diagnoses and all orders for this visit:    1. Anxiety - controlled! Refill buPROPion XL (WELLBUTRIN XL) 150 mg tablet; Take 1 Tab by mouth every morning. 2. Fatigue, unspecified type - resolved! Refill buPROPion XL (WELLBUTRIN XL) 150 mg tablet; Take 1 Tab by mouth every morning. Discussed possibility of increasing dose to 300 mg if anx/dep worsens later this winter, but pt is happy at this dose now. Subjective:   Lenin Santos is a 26 y.o. female who was seen for Medication Evaluation (4 week follow up)    Wellbutrin follow up - changed Sertraline to Wellbutrin  mg at last visit 1 month ago. Had some nausea with starting this, but that has now resolved. Happy with this change in medication and feels that it is working well - improved energy level and anxiety is controlled. Seeing GYN for Paps. Health Maintenance Due   Topic Date Due    PAP AKA CERVICAL CYTOLOGY  07/01/2017       Review of Systems   Respiratory: Negative for shortness of breath. Cardiovascular: Negative for chest pain and palpitations. Gastrointestinal: Negative for nausea. Neurological: Negative for dizziness, loss of consciousness and weakness. Psychiatric/Behavioral: Negative for depression. The patient is not nervous/anxious. Objective:   No flowsheet data found. There were no vitals taken for this visit.     General: alert, cooperative, no distress   Mental  status: normal mood, behavior, speech, dress, motor activity, and thought processes, able to follow commands   HENT: NCAT   Neck: no visualized mass   Resp: no respiratory distress   Neuro: no gross deficits   Skin: no discoloration or lesions of concern on visible areas   Psychiatric: normal affect, consistent with stated mood, no evidence of hallucinations     We discussed the expected course, resolution and complications of the diagnosis(es) in detail. Medication risks, benefits, costs, interactions, and alternatives were discussed as indicated. I advised her to contact the office if her condition worsens, changes or fails to improve as anticipated. She expressed understanding with the diagnosis(es) and plan. Raffaele Amaro is a 26 y.o. female who was evaluated by a video visit encounter for concerns as above. Patient identification was verified prior to start of the visit. A caregiver was present when appropriate. Due to this being a TeleHealth encounter (During DPUNA-60 public health emergency), evaluation of the following organ systems was limited: Vitals/Constitutional/EENT/Resp/CV/GI//MS/Neuro/Skin/Heme-Lymph-Imm. Pursuant to the emergency declaration under the [facility], 1135 waiver authority and the [employer] Act, this Virtual  Visit was conducted, with patient's (and/or legal guardian's) consent, to reduce the patient's risk of exposure to COVID-19 and provide necessary medical care. Services were provided through a video synchronous discussion virtually to substitute for in-person clinic visit. Patient and provider were located at their individual homes.       Danny Low PA-C

## 2021-04-14 RX ORDER — ONDANSETRON 4 MG/1
TABLET, FILM COATED ORAL
Qty: 30 TAB | Refills: 0 | Status: SHIPPED | OUTPATIENT
Start: 2021-04-14 | End: 2022-08-02

## 2021-08-04 NOTE — PROGRESS NOTES
Threatened AB note    Kana Epperson is a ,  33 y.o. female OTHER Patient's last menstrual period was 06/10/2021. making her 8 weeks pregnant. She has not had prenatal care. She presents with spotting that started several days ago . The amount of bleeding is described as light. She has not passed tissue. She had a positive pregnancy test 1 month ago. She has had a recent pelvic ultrasound that showed:    TV ULTRASOUND PERFORMED  A SINGLE NONVIABLE 6W5D IUP IS SEEN WITH ABSENT CARDIAC RHYTHM. GESTATIONAL AGE BASED ON TODAYS ULTRASOUND. A NORMAL YOLK SAC IS SEEN. RIGHT OVARY APPEARS WITHIN NORMAL LIMITS. LEFT OVARY APPEARS TO HAVE A CL CYST.  NO FREE FLUID SEEN IN THE CDS. Her past medical history is not significant for risk factors for ectopic pregnancy. She has not had pelvic pain. The patient specifically denies right or left pelvic pain. She does not have a history of a spontaneous . She has not had a recent injury or trauma. Additional complaints: none.      Past Medical History:   Diagnosis Date    ADHD (attention deficit hyperactivity disorder)     Anxiety     [facility]     Depression 4/3/2010    Glucose intolerance (impaired glucose tolerance)     IBD (inflammatory bowel disease)     Insomnia 4/3/2010     Past Surgical History:   Procedure Laterality Date    HX  SECTION      MT BREAST SURGERY PROCEDURE UNLISTED       Social History     Occupational History    Occupation: [employer]   Tobacco Use    Smoking status: Former Smoker     Packs/day: 0.30     Years: 3.00     Pack years: 0.90     Types: Cigarettes    Smokeless tobacco: Never Used   Substance and Sexual Activity    Alcohol use: No     Alcohol/week: 0.0 standard drinks    Drug use: No    Sexual activity: Yes     Partners: Male     Family History   Adopted: Yes       Allergies   Allergen Reactions    Cephalexin Rash    Pcn [Penicillins] Rash     Prior to Admission medications    Medication Sig Start Date End Date Taking? Authorizing Provider   ondansetron hcl (ZOFRAN) 4 mg tablet TAKE 1 TABLET BY MOUTH EVERY 8 HOURS AS NEEDED FOR NAUSEA OR VOMITING 4/14/21   Davy Rodriguez PA-C   loperamide (IMODIUM) 2 mg capsule TAKE 1 CAPSULE BY MOUTH FOUR TIMES DAILY AS NEEDED FOR DIARRHEA 12/8/20   Davy Rodriguez PA-C   buPROPion XL (WELLBUTRIN XL) 150 mg tablet Take 1 Tab by mouth every morning. 12/8/20   Davy Rodriguez PA-C   NORETHINDRONE ACETATE PO norethindrone (contraceptive) 0.35 mg tablet    Provider, Historical   mometasone (ELOCON) 0.1 % ointment Apply  to affected area daily. 3/29/18   Cassandra Cuevas PA-C   valACYclovir (VALTREX) 500 mg tablet Take  by mouth two (2) times a day.     Provider, Historical        Review of Systems: History obtained from the patient  Constitutional: negative for weight loss, fever, night sweats  Breast: negative for breast lumps, nipple discharge, galactorrhea  GI: negative for change in bowel habits, abdominal pain, black or bloody stools  : negative for frequency, dysuria, hematuria, vaginal discharge  MSK: negative for back pain, joint pain, muscle pain  Skin: negative for itching, rash, hives  Psych: negative for anxiety, depression, change in mood      Objective:  Visit Vitals  BP (!) 111/57   Wt 174 lb (78.9 kg)   LMP 06/10/2021   BMI 33.98 kg/m²       Physical Exam:   PHYSICAL EXAMINATION    Constitutional  · Appearance: well-nourished, well developed, alert, in no acute distress    Gastrointestinal  · Abdominal Examination: abdomen non-tender to palpation, normal bowel sounds, no masses present  · Liver and spleen: no hepatomegaly present, spleen not palpable  · Hernias: no hernias identified    Genitourinary  · External Genitalia: normal appearance for age, no discharge present, no tenderness present, no inflammatory lesions present, no masses present, no atrophy present  · Vagina: normal vaginal vault without central or paravaginal defects, bloody discharge present, no inflammatory lesions present, no masses present  · Bladder: non-tender to palpation  · Urethra: appears normal  · Cervix: normal   · Uterus: enlarged, soft, mildly tender with normal shape and consistency  · Adnexa: no adnexal tenderness present, no adnexal masses present  · Perineum: perineum within normal limits, no evidence of trauma, no rashes or skin lesions present  · Anus: anus within normal limits, no hemorrhoids present  · Inguinal Lymph Nodes: no lymphadenopathy present    Skin  · General Inspection: no rash, no lesions identified    Neurologic/Psychiatric  · Mental Status:  · Orientation: grossly oriented to person, place and time  Mood and Affect: mood normal, affect appropriate  Assessment:   Missed     Plan:   Miso 1000mcg in vagina at bedtime repeat in 12 hrs if needed  Disc side effects, N/V/D  Fu in 2 weeks with US  HCG, ABO

## 2021-08-05 ENCOUNTER — INITIAL PRENATAL (OUTPATIENT)
Dept: OBGYN CLINIC | Age: 31
End: 2021-08-05

## 2021-08-05 VITALS — SYSTOLIC BLOOD PRESSURE: 111 MMHG | WEIGHT: 174 LBS | DIASTOLIC BLOOD PRESSURE: 57 MMHG | BODY MASS INDEX: 33.98 KG/M2

## 2021-08-05 DIAGNOSIS — O02.1 MISSED ABORTION: Primary | ICD-10-CM

## 2021-08-05 PROCEDURE — 99203 OFFICE O/P NEW LOW 30 MIN: CPT | Performed by: OBSTETRICS & GYNECOLOGY

## 2021-08-05 RX ORDER — MISOPROSTOL 200 UG/1
TABLET ORAL
Qty: 10 TABLET | Refills: 0 | Status: ON HOLD | OUTPATIENT
Start: 2021-08-05 | End: 2022-07-09

## 2021-08-05 RX ORDER — ONDANSETRON 8 MG/1
8 TABLET, ORALLY DISINTEGRATING ORAL
Qty: 15 TABLET | Refills: 0 | Status: ON HOLD | OUTPATIENT
Start: 2021-08-05 | End: 2022-07-09

## 2021-08-05 RX ORDER — HYDROCODONE BITARTRATE AND ACETAMINOPHEN 5; 325 MG/1; MG/1
1 TABLET ORAL
Qty: 6 TABLET | Refills: 0 | Status: SHIPPED | OUTPATIENT
Start: 2021-08-05 | End: 2021-08-08

## 2021-08-06 ENCOUNTER — TELEPHONE (OUTPATIENT)
Dept: OBGYN CLINIC | Age: 31
End: 2021-08-06

## 2021-08-06 LAB
ABO + RH BLD: NORMAL
BLOOD BANK CMNT PATIENT-IMP: NORMAL
BLOOD GROUP ANTIBODIES SERPL: NORMAL
HCG SERPL-ACNC: ABNORMAL MIU/ML (ref 0–6)
SPECIMEN EXP DATE BLD: NORMAL

## 2021-08-06 NOTE — TELEPHONE ENCOUNTER
Agree.  If she thinks she has passed everything and bleeding is slowing down (which it sounds like), then does not need to repeat the dose.

## 2021-08-06 NOTE — TELEPHONE ENCOUNTER
Call received at 523am    33year old patient last seen in the office yesterday. Patient reports she used the miSOPROStoL (CYTOTEC) 200 mcg tablet [[id number]]     Order Details  Dose, Route, Frequency: As Directed   Dispense Quantity: 10 Tablet Refills: 0          Si pills in vagina at bedtime, repeat in 12 hours and lie down for 2 hours if no result     Last night and had heavy bleeding, changing 4 pads last night, and cramping    Patient reports the bleeding has decreased and now the cramping is at 5 on the pain scale of 1-10.     Patient wondering if she needs to repeat the cytotec this PM    This nurse advised that she did not need to repeat the dose    Please confirm    Bleeding and pain precautions reviewed with patient       Please advise    Thank you

## 2021-08-10 ENCOUNTER — TELEPHONE (OUTPATIENT)
Dept: OBGYN CLINIC | Age: 31
End: 2021-08-10

## 2021-08-10 NOTE — TELEPHONE ENCOUNTER
Call received at 650am    33year old patient last seen in the office on 85/2021    Patient calling to reschedule her follow up ultrasound and ov that is currently 8/18/2021    This nurse could not find next available appointment till 9/3/2021    Patient is going to keep current appointment and will call back if needed    Patient verbalized understanding.

## 2021-08-17 NOTE — PROGRESS NOTES
Threatened AB note    Jacki Goyal is a ,  33 y.o. female OTHER Patient's last menstrual period was 06/10/2021. making her 6 weeks pregnant. She has not had prenatal care. The amount of bleeding is described as light to moderate . She has passed tissue post miso. Patient states she had bleeding for 1 week. She had a positive pregnancy test 1 month ago. She has had a recent pelvic ultrasound that showed:    TRANSVAGINAL ULTRASOUND PERFORMED  UTERUS IS ANTEVERTED, NORMAL IN SIZE AND ECHOGENICITY. ENDOMETRIUM MEASURES 4-5MM IN THICKNESS. NO IUP IS SEEN. A SMALL AMOUNT OF FLUID IS SEEN WITHIN  THE ENDOMETRIUM. RIGHT OVARY APPEARS WITHIN NORMAL LIMITS. LEFT OVARY APPEARS WITHIN NORMAL LIMITS. NO FREE FLUID SEEN IN THE CDS. Her past medical history is not significant for risk factors for ectopic pregnancy. She has not had pelvic pain. The patient specifically denies right or left pelvic pain. She does have a history of a spontaneous . She has not had a recent injury or trauma. Additional complaints: none.      Past Medical History:   Diagnosis Date    ADHD (attention deficit hyperactivity disorder)     Anxiety     [facility] ([facility]     Depression 4/3/2010    Glucose intolerance (impaired glucose tolerance)     IBD (inflammatory bowel disease)     Insomnia 4/3/2010     Past Surgical History:   Procedure Laterality Date    HX  SECTION      PA BREAST SURGERY PROCEDURE UNLISTED       Social History     Occupational History    Occupation: [employer]   Tobacco Use    Smoking status: Former Smoker     Packs/day: 0.30     Years: 3.00     Pack years: 0.90 Types: Cigarettes    Smokeless tobacco: Never Used   Substance and Sexual Activity    Alcohol use: No     Alcohol/week: 0.0 standard drinks    Drug use: No    Sexual activity: Yes     Partners: Male     Family History   Adopted: Yes       Allergies   Allergen Reactions    Cephalexin Rash    Pcn [Penicillins] Rash     Prior to Admission medications    Medication Sig Start Date End Date Taking? Authorizing Provider   miSOPROStoL (CYTOTEC) 200 mcg tablet 5 pills in vagina at bedtime, repeat in 12 hours and lie down for 2 hours if no result 8/5/21   Neto Kim MD   ondansetron (ZOFRAN ODT) 8 mg disintegrating tablet Take 1 Tablet by mouth every eight (8) hours as needed for Nausea or Vomiting. 8/5/21   Neto Kim MD   ondansetron hcl (ZOFRAN) 4 mg tablet TAKE 1 TABLET BY MOUTH EVERY 8 HOURS AS NEEDED FOR NAUSEA OR VOMITING 4/14/21   Ha Bejarano PA-C   loperamide (IMODIUM) 2 mg capsule TAKE 1 CAPSULE BY MOUTH FOUR TIMES DAILY AS NEEDED FOR DIARRHEA 12/8/20   Ha Bejarano PA-C   buPROPion XL (WELLBUTRIN XL) 150 mg tablet Take 1 Tab by mouth every morning. 12/8/20   Ha Bejarano PA-C   NORETHINDRONE ACETATE PO norethindrone (contraceptive) 0.35 mg tablet    Provider, Historical   mometasone (ELOCON) 0.1 % ointment Apply  to affected area daily. 3/29/18   Cassandra Cuevas PA-C   valACYclovir (VALTREX) 500 mg tablet Take  by mouth two (2) times a day.     Provider, Historical        Review of Systems: History obtained from the patient  Constitutional: negative for weight loss, fever, night sweats  Breast: negative for breast lumps, nipple discharge, galactorrhea  GI: negative for change in bowel habits, abdominal pain, black or bloody stools  : negative for frequency, dysuria, hematuria, vaginal discharge  MSK: negative for back pain, joint pain, muscle pain  Skin: negative for itching, rash, hives  Psych: negative for anxiety, depression, change in mood      Objective:  Visit Vitals  LMP 06/10/2021       Physical Exam:   PHYSICAL EXAMINATION    Constitutional  · Appearance: well-nourished, well developed, alert, in no acute distress    Gastrointestinal  · Abdominal Examination: abdomen non-tender to palpation, normal bowel sounds, no masses present  · Liver and spleen: no hepatomegaly present, spleen not palpable  · Hernias: no hernias identified    Genitourinary  · External Genitalia: normal appearance for age, no discharge present, no tenderness present, no inflammatory lesions present, no masses present, no atrophy present  · Vagina: normal vaginal vault without central or paravaginal defects, bloody discharge present, no inflammatory lesions present, no masses present  · Bladder: non-tender to palpation  · Urethra: appears normal  · Cervix: normal   · Uterus: enlarged, soft, mildly tender with normal shape and consistency  · Adnexa: no adnexal tenderness present, no adnexal masses present  · Perineum: perineum within normal limits, no evidence of trauma, no rashes or skin lesions present  · Anus: anus within normal limits, no hemorrhoids present  · Inguinal Lymph Nodes: no lymphadenopathy present    Skin  · General Inspection: no rash, no lesions identified    Neurologic/Psychiatric  · Mental Status:  · Orientation: grossly oriented to person, place and time  · Mood and Affect: mood normal, affect appropriate    Assessment:   Complete     Plan:   HCG and follow to zero  AE 2021  Note after stopping OCP took several months for periods to return

## 2021-08-18 ENCOUNTER — OFFICE VISIT (OUTPATIENT)
Dept: OBGYN CLINIC | Age: 31
End: 2021-08-18

## 2021-08-18 DIAGNOSIS — O02.1 MISSED ABORTION: Primary | ICD-10-CM

## 2021-08-18 PROCEDURE — 99213 OFFICE O/P EST LOW 20 MIN: CPT | Performed by: OBSTETRICS & GYNECOLOGY

## 2021-08-19 LAB — HCG SERPL-ACNC: 8 MIU/ML (ref 0–6)

## 2021-08-25 ENCOUNTER — LAB ONLY (OUTPATIENT)
Dept: OBGYN CLINIC | Age: 31
End: 2021-08-25

## 2021-08-25 DIAGNOSIS — O03.4 ABORTION, INCOMPLETE: Primary | ICD-10-CM

## 2021-08-25 LAB — HCG SERPL-ACNC: 2 MIU/ML (ref 0–6)

## 2021-09-14 NOTE — PROGRESS NOTES
Shane Carrero is a ,  33 y.o. female OTHER whose Patient's last menstrual period was 2021. was on 2021 who presents for her annual checkup. She is having no significant problems. Desires conception    With regard to the Gardisil vaccine, she has not received it yet. Menstrual status:    Her periods are light, moderate in flow. She is using one to two pads or tampons per day, usually regular and last 26-30 days. She denies dysmenorrhea. She reports no premenstrual symptoms. Contraception:    The current method of family planning is none and She declines contraception and counseling. Sexual history:    She  reports being sexually active and has had partner(s) who are Male. Medical conditions:    Since her last annual GYN exam about two years ago, she has not the following changes in her health history: none. Pap and Mammogram History:    Her most recent Pap smear was in 2019, per patient results abnormal. Colpo performed results normal    The patient has never had a mammogram.    The patient does not have a family history of breast cancer. Past Medical History:   Diagnosis Date    ADHD (attention deficit hyperactivity disorder)     Anxiety     Bipolar 1 disorder ([facility])     Depression 4/3/2010    Glucose intolerance (impaired glucose tolerance)     IBD (inflammatory bowel disease)     Insomnia 4/3/2010     Past Surgical History:   Procedure Laterality Date    HX  SECTION      CA BREAST SURGERY PROCEDURE UNLISTED         Current Outpatient Medications   Medication Sig Dispense Refill    miSOPROStoL (CYTOTEC) 200 mcg tablet 5 pills in vagina at bedtime, repeat in 12 hours and lie down for 2 hours if no result 10 Tablet 0    ondansetron (ZOFRAN ODT) 8 mg disintegrating tablet Take 1 Tablet by mouth every eight (8) hours as needed for Nausea or Vomiting.  15 Tablet 0    ondansetron hcl (ZOFRAN) 4 mg tablet TAKE 1 TABLET BY MOUTH EVERY 8 HOURS AS NEEDED FOR NAUSEA OR VOMITING 30 Tab 0    loperamide (IMODIUM) 2 mg capsule TAKE 1 CAPSULE BY MOUTH FOUR TIMES DAILY AS NEEDED FOR DIARRHEA 120 Cap 5    buPROPion XL (WELLBUTRIN XL) 150 mg tablet Take 1 Tab by mouth every morning. 90 Tab 1    NORETHINDRONE ACETATE PO norethindrone (contraceptive) 0.35 mg tablet      mometasone (ELOCON) 0.1 % ointment Apply  to affected area daily. 15 g 1    valACYclovir (VALTREX) 500 mg tablet Take  by mouth two (2) times a day. Allergies: Cephalexin and Pcn [penicillins]   Social History     Socioeconomic History    Marital status:      Spouse name: Jadyn Guaman Number of children: 1    Years of education: Not on file    Highest education level: Not on file   Occupational History    Occupation: [employer]   Tobacco Use    Smoking status: Former Smoker     Packs/day: 0.30     Years: 3.00     Pack years: 0.90     Types: Cigarettes    Smokeless tobacco: Never Used   Substance and Sexual Activity    Alcohol use: No     Alcohol/week: 0.0 standard drinks    Drug use: No    Sexual activity: Yes     Partners: Male   Other Topics Concern    Not on file   Social History Narrative    Not on file     Social Determinants of Health     Financial Resource Strain:     Difficulty of Paying Living Expenses:    Food Insecurity:     Worried About Running Out of Food in the Last Year:     Ran Out of Food in the Last Year:    Transportation Needs:     Lack of Transportation (Medical):      Lack of Transportation (Non-Medical):    Physical Activity:     Days of Exercise per Week:     Minutes of Exercise per Session:    Stress:     Feeling of Stress :    Social Connections:     Frequency of Communication with Friends and Family:     Frequency of Social Gatherings with Friends and Family:     Attends [employer] Services:     Active Member of Clubs or Organizations:     Attends Club or Organization Meetings:     Marital Status:    Intimate Partner Violence:     Fear of Current or Ex-Partner:     Emotionally Abused:     Physically Abused:     Sexually Abused: Tobacco History:  reports that she has quit smoking. Her smoking use included cigarettes. She has a 0.90 pack-year smoking history. She has never used smokeless tobacco.  Alcohol Abuse:  reports no history of alcohol use. Drug Abuse:  reports no history of drug use.     Patient Active Problem List   Diagnosis Code    Insomnia G47.00    Depression F32.9    ADHD (attention deficit hyperactivity disorder) F90.9    Anxiety F41.9    IBD (inflammatory bowel disease) K52.9    Glucose intolerance (impaired glucose tolerance) R73.02    Adult situational stress disorder F43.20    Fatty liver K76.0       Review of Systems - History obtained from the patient  Constitutional: negative for weight loss, fever, night sweats  HEENT: negative for hearing loss, earache, congestion, snoring, sorethroat  CV: negative for chest pain, palpitations, edema  Resp: negative for cough, shortness of breath, wheezing  GI: negative for change in bowel habits, abdominal pain, black or bloody stools  : negative for frequency, dysuria, hematuria, vaginal discharge  MSK: negative for back pain, joint pain, muscle pain  Breast: negative for breast lumps, nipple discharge, galactorrhea  Skin :negative for itching, rash, hives  Neuro: negative for dizziness, headache, confusion, weakness  Psych: negative for anxiety, depression, change in mood  Heme/lymph: negative for bleeding, bruising, pallor    Physical Exam    Visit Vitals  /64   Wt 169 lb 9.6 oz (76.9 kg)   LMP 09/09/2021   Breastfeeding Unknown   BMI 33.12 kg/m²       Constitutional  · Appearance: well-nourished, well developed, alert, in no acute distress    HENT  · Head and Face: appears normal    Neck  · Inspection/Palpation: normal appearance, no masses or tenderness  · Lymph Nodes: no lymphadenopathy present  · Thyroid: gland size normal, nontender, no nodules or masses present on palpation    Chest  · Respiratory Effort: breathing normal  · Auscultation: normal breath sounds    Cardiovascular  · Heart:  · Auscultation: regular rate and rhythm without murmur    Breasts  · Inspection of Breasts: breasts symmetrical, no skin changes, no discharge present, nipple appearance normal, no skin retraction present  · Palpation of Breasts and Axillae: no masses present on palpation, no breast tenderness  · Axillary Lymph Nodes: no lymphadenopathy present    Gastrointestinal  · Abdominal Examination: abdomen non-tender to palpation, normal bowel sounds, no masses present  · Liver and spleen: no hepatomegaly present, spleen not palpable  · Hernias: no hernias identified    Genitourinary  · External Genitalia: normal appearance for age, no discharge present, no tenderness present, no inflammatory lesions present, no masses present, no atrophy present  · Vagina: normal vaginal vault without central or paravaginal defects, no discharge present, no inflammatory lesions present, no masses present  · Bladder: non-tender to palpation  · Urethra: appears normal  · Cervix: normal   · Uterus: normal size, shape and consistency  · Adnexa: no adnexal tenderness present, no adnexal masses present  · Perineum: perineum within normal limits, no evidence of trauma, no rashes or skin lesions present  · Anus: anus within normal limits, no hemorrhoids present  · Inguinal Lymph Nodes: no lymphadenopathy present    Skin  · General Inspection: no rash, no lesions identified    Neurologic/Psychiatric  · Mental Status:  · Orientation: grossly oriented to person, place and time  · Mood and Affect: mood normal, affect appropriate    . Assessment:  Routine gynecologic examination  Her current medical status is satisfactory with no evidence of significant gynecologic issues.   Last pap \"abnormal with normal colpo\"    Plan:  Counseled re: diet, exercise, healthy lifestyle  Return for yearly wellness visits  Pt counseled regarding co-testing for high risk HPV with pap  colpo if abnormal  Disc times IC - keep taking vit

## 2021-09-15 ENCOUNTER — OFFICE VISIT (OUTPATIENT)
Dept: OBGYN CLINIC | Age: 31
End: 2021-09-15
Payer: COMMERCIAL

## 2021-09-15 VITALS — BODY MASS INDEX: 33.12 KG/M2 | DIASTOLIC BLOOD PRESSURE: 64 MMHG | WEIGHT: 169.6 LBS | SYSTOLIC BLOOD PRESSURE: 108 MMHG

## 2021-09-15 DIAGNOSIS — Z01.419 ENCNTR FOR GYN EXAM (GENERAL) (ROUTINE) W/O ABN FINDINGS: Primary | ICD-10-CM

## 2021-09-15 DIAGNOSIS — Z11.51 SCREENING FOR HPV (HUMAN PAPILLOMAVIRUS): ICD-10-CM

## 2021-09-15 PROCEDURE — 99395 PREV VISIT EST AGE 18-39: CPT | Performed by: OBSTETRICS & GYNECOLOGY

## 2021-09-19 LAB
CYTOLOGIST CVX/VAG CYTO: NORMAL
CYTOLOGY CVX/VAG DOC CYTO: NORMAL
CYTOLOGY CVX/VAG DOC THIN PREP: NORMAL
CYTOLOGY HISTORY:: NORMAL
DX ICD CODE: NORMAL
HPV I/H RISK 4 DNA CVX QL PROBE+SIG AMP: NEGATIVE
Lab: NORMAL
OTHER STN SPEC: NORMAL
STAT OF ADQ CVX/VAG CYTO-IMP: NORMAL

## 2021-11-29 NOTE — PROGRESS NOTES
Current pregnancy history:    Boaz Moss is a 33 y.o. female who presents for the evaluation of pregnancy. Patient's last menstrual period was 10/06/2021 (exact date). LMP history:  The date of her LMP is  certain. Her last menstrual period was normal and lasted for 4 to 5 days. A urine pregnancy test was positive 1 month ago. She was not on the pill at conception. Based on her LMP, her EDC is 2022 and her EGA is 7 weeks,6 days. Her menstrual cycles are regular. Ultrasound data:  She had an  ultrasound done by the ultrasound tech today which revealed a viable molina pregnancy with a gestational age of 11 weeks and 1 days giving an [address] of 2022. Pregnancy symptoms:    Since her LMP she has experienced  urinary frequency, breast tenderness, and nausea. She has not been vomiting over the last few weeks. Associated signs and symptoms which she denies: dysuria, discharge, vaginal bleeding. She states she has  gained weight:  Approximately 4 pounds over the last few weeks. Relevant past pregnancy history:   She has the following pregnancy history: Fourth pregnancy. Her last pregnancy SAB    She has no history of  delivery. Relevant past medical history:(relevant to this pregnancy): noncontributory. Pap/Occupational history:  Last pap smear: 9/15/2021 normal/HPV neg    Her occupation is: .     Substance history: negative for alcohol, tobacco and street drugs. Positive for nothing. Exposure history: There are no indoor cat/s in the home. She admits close contact with children on a regular basis. She has had chicken pox or the vaccine in the past.   Patient denies issues with domestic violence. Genetic Screening/Teratology Counseling: (Includes patient, baby's father, or anyone in either family with:)  3.  Patient's age >/= 28 at [address]?-- no  .   2. Thalassemia ([facility] [place] [address], or  background): MCV<80?--no. 3.  Neural tube defect (meningomyelocele, spina bifida, anencephaly)?--no.   4.  Congenital heart defect?--no.  5.  Down syndrome?--no.   6.  Julio Cesar-Sachs ([facility] [place])?--no.   7.  Canavan's Disease?--no.   8.  Familial Dysautonomia?--no.   9.  Sickle cell disease or trait ()? --no   The patient has not been tested for sickle trait  10. Hemophilia or other blood disorders?--no. 11.  Muscular dystrophy?--no. 12.  Cystic fibrosis?--no. 13.  Perry's Chorea?--no. 14.  Mental retardation/autism (if yes was person tested for Fragile X)?--no. 15.  Other inherited genetic or chromosomal disorder?--no. 12.  Maternal metabolic disorder (DM, PKU, etc)?--no. 17.  Patient or FOB with a child with a birth defect not listed above?--no.  17a. Patient or FOB with a birth defect themselves?--no. 18.  Recurrent pregnancy loss, or stillbirth?--no. 19.  Any medications since LMP other than prenatal vitamins (include vitamins,  supplements, OTC meds, drugs, alcohol)?--no. 20.  Any other genetic/environmental exposure to discuss?--no. Infection History:  1. Lives with someone with TB or TB exposed?--no.   2.  Patient or partner has history of genital herpes?--yes last outbreak 8 years ago  3. Rash or viral illness since LMP?--no.    4.  History of STD (GC, CT, HPV, syphilis, HIV)? --no   5. Other: OTHER?      TV ULTRASOUND PERFORMED  A SINGLE VIABLE 8W1D IUP IS SEEN WITH NORMAL CARDIAC RHYTHM. GESTATIONAL AGE BASED ON TODAYS ULTRASOUND. A NORMAL YOLK SAC IS SEEN. RIGHT OVARY APPEARS WITHIN NORMAL LIMITS. LEFT OVARY APPEARS WITHIN NORMAL LIMITS. A CL CYST IS SEEN. NO FREE FLUID SEEN IN THE CDS.     OB History    Para Term  AB Living   4 2 2 0 1 2   SAB IAB Ectopic Molar Multiple Live Births   1 0 0 0 0 2      # Outcome Date GA Lbr José Miguel/2nd Weight Sex Delivery Anes PTL Lv   4 Current            3 Term 19 39w0d  7 lb (3.175 kg) F CS-Unspec  N NADINE   2 Term 18 41w0d  8 lb 8 oz (3.856 kg) M CS-Unspec  N NADINE   1 SAB                  Past Medical History:   Diagnosis Date    ADHD (attention deficit hyperactivity disorder)     Anxiety     Bipolar 1 disorder ([facility]     Depression 4/3/2010    Glucose intolerance (impaired glucose tolerance)     IBD (inflammatory bowel disease)     Insomnia 4/3/2010     Past Surgical History:   Procedure Laterality Date    HX  SECTION      SC BREAST SURGERY PROCEDURE UNLISTED       Social History     Occupational History    Occupation: [employer]   Tobacco Use    Smoking status: Former Smoker     Packs/day: 0.30     Years: 3.00     Pack years: 0.90     Types: Cigarettes    Smokeless tobacco: Never Used   Substance and Sexual Activity    Alcohol use: No     Alcohol/week: 0.0 standard drinks    Drug use: No    Sexual activity: Yes     Partners: Male     Family History   Adopted: Yes       Allergies   Allergen Reactions    Cephalexin Rash    Pcn [Penicillins] Rash     Prior to Admission medications    Medication Sig Start Date End Date Taking? Authorizing Provider   miSOPROStoL (CYTOTEC) 200 mcg tablet 5 pills in vagina at bedtime, repeat in 12 hours and lie down for 2 hours if no result 21   Ricky Sam MD   ondansetron (ZOFRAN ODT) 8 mg disintegrating tablet Take 1 Tablet by mouth every eight (8) hours as needed for Nausea or Vomiting. 21   Ricky Sam MD   ondansetron hcl (ZOFRAN) 4 mg tablet TAKE 1 TABLET BY MOUTH EVERY 8 HOURS AS NEEDED FOR NAUSEA OR VOMITING 21   Starlette Breath, PA-C   loperamide (IMODIUM) 2 mg capsule TAKE 1 CAPSULE BY MOUTH FOUR TIMES DAILY AS NEEDED FOR DIARRHEA 20   Starlette Breath, PA-C   buPROPion XL (WELLBUTRIN XL) 150 mg tablet Take 1 Tab by mouth every morning. 20   Starlette Breath, PA-C   NORETHINDRONE ACETATE PO norethindrone (contraceptive) 0.35 mg tablet    Provider, Historical   mometasone (ELOCON) 0.1 % ointment Apply  to affected area daily.  3/29/18   Juan Cuevas, PATY   valACYclovir (VALTREX) 500 mg tablet Take  by mouth two (2) times a day.     Provider, Historical        Review of Systems: History obtained from the patient  Constitutional: negative for weight loss, fever, night sweats  HEENT: negative for hearing loss, earache, congestion, snoring, sorethroat  CV: negative for chest pain, palpitations, edema  Resp: negative for cough, shortness of breath, wheezing  Breast: negative for breast lumps, nipple discharge, galactorrhea  GI: negative for change in bowel habits, abdominal pain, black or bloody stools  : negative for frequency, dysuria, hematuria, vaginal discharge  MSK: negative for back pain, joint pain, muscle pain  Skin: negative for itching, rash, hives  Neuro: negative for dizziness, headache, confusion, weakness  Psych: negative for anxiety, depression, change in mood  Heme/lymph: negative for bleeding, bruising, pallor    Objective:  Visit Vitals  BP (!) 117/55   Ht 5' (1.524 m)   Wt 171 lb 12.8 oz (77.9 kg)   LMP 10/06/2021 (Exact Date)   BMI 33.55 kg/m²       Physical Exam:   PHYSICAL EXAMINATION    Constitutional  · Appearance: well-nourished, well developed, alert, in no acute distress    HENT  · Head  · Face: appears normal  · Eyes: appear normal  · Ears: normal  · Mouth: normal  · Lips: no lesions    Neck  · Inspection/Palpation: normal appearance, no masses or tenderness  · Lymph Nodes: no lymphadenopathy present  · Thyroid: gland size normal, nontender, no nodules or masses present on palpation    Chest  · Respiratory Effort: breathing unlabored  · Auscultation: normal breath sounds    Cardiovascular  · Heart:  · Auscultation: regular rate and rhythm without murmur    Breasts  · Inspection of Breasts: breasts symmetrical, no skin changes, no discharge present, nipple appearance normal, no skin retraction present  · Palpation of Breasts and Axillae: no masses present on palpation, no breast tenderness  · Axillary Lymph Nodes: no lymphadenopathy present    Gastrointestinal  · Abdominal Examination: abdomen non-tender to palpation, normal bowel sounds, no masses present  · Liver and spleen: no hepatomegaly present, spleen not palpable  · Hernias: no hernias identified    Genitourinary  · External Genitalia: normal appearance for age, no discharge present, no tenderness present, no inflammatory lesions present, no masses present, no atrophy present  · Vagina: normal vaginal vault without central or paravaginal defects, no discharge present, no inflammatory lesions present, no masses present  · Bladder: non-tender to palpation  · Urethra: appears normal  · Cervix: normal   · Uterus: enlarged, normal shape, soft  · Adnexa: no adnexal tenderness present, no adnexal masses present  · Perineum: perineum within normal limits, no evidence of trauma, no rashes or skin lesions present  · Anus: anus within normal limits, no hemorrhoids present  · Inguinal Lymph Nodes: no lymphadenopathy present    Skin  · General Inspection: no rash, no lesions identified    Neurologic/Psychiatric  · Mental Status:  · Orientation: grossly oriented to person, place and time  · Mood and Affect: mood normal, affect appropriate    Assessment:   Intrauterine pregnancy with the following problems identified:   [facility] 7/14/2022 D=[place]  Covid vaccine booster due Jan 11  Flu vaccine 11/30/2021  NIPTS? Horizon? Hx of CS x 2 - repeat at term  HSV II - suppression at 36 weeks  Delayed PPH PPD 5 - transfusion  [place]ruise 1/22        Plan:     Offered CF testing, CVS, Nuchal Translucency, MSAFP, amnio, and discussed NIPT  Course of pregnancy discussed including visit schedule, routine U/S, glucola testing, etc.  Avoid alcoholic beverages and illicit/recreational drugs use  Take prenatal vitamins or folic acid daily. Hospital and practice style discussed with coverage system.   Discussed nutrition, toxoplasmosis precautions, sexual activity, exercise, need for influenza vaccine, environmental and work hazards, travel advice, screen for domestic violence, need for seat belts. Discussed seafood, unpasteurized dairy products, deli meat, artificial sweeteners, and caffeine. Information on prenatal classes/breastfeeding given. Information on circumcision given  Patient encouraged not to smoke. Discussed current prescription drug use. Given medication list.  Discussed the use of over the counter medications and chemicals. Route of delivery discussed, including risks, benefits, and alternatives of  versus repeat LTCS. Pt understands risk of hemorrhage during pregnancy and post delivery and would accept blood products if necessary in life-threatening emergencies      Handouts given to pt.

## 2021-11-30 ENCOUNTER — ROUTINE PRENATAL (OUTPATIENT)
Dept: OBGYN CLINIC | Age: 31
End: 2021-11-30

## 2021-11-30 VITALS
WEIGHT: 171.8 LBS | BODY MASS INDEX: 33.73 KG/M2 | HEIGHT: 60 IN | SYSTOLIC BLOOD PRESSURE: 117 MMHG | DIASTOLIC BLOOD PRESSURE: 55 MMHG

## 2021-11-30 DIAGNOSIS — Z34.80 SUPERVISION OF OTHER NORMAL PREGNANCY, ANTEPARTUM: Primary | ICD-10-CM

## 2021-11-30 PROBLEM — Z34.90 SUPERVISION OF NORMAL PREGNANCY: Status: ACTIVE | Noted: 2021-11-30

## 2021-11-30 LAB
ANTIBODY SCREEN, EXTERNAL: NEGATIVE
CHLAMYDIA, EXTERNAL: NEGATIVE
HBSAG, EXTERNAL: NEGATIVE
HEPATITIS C AB,   EXT: NON REACTIVE
HIV, EXTERNAL: NON REACTIVE
RUBELLA, EXTERNAL: REACTIVE
T. PALLIDUM, EXTERNAL: NON REACTIVE
TYPE, ABO & RH, EXTERNAL: NORMAL

## 2021-11-30 PROCEDURE — 90471 IMMUNIZATION ADMIN: CPT | Performed by: OBSTETRICS & GYNECOLOGY

## 2021-11-30 PROCEDURE — 90686 IIV4 VACC NO PRSV 0.5 ML IM: CPT | Performed by: OBSTETRICS & GYNECOLOGY

## 2021-11-30 PROCEDURE — 0502F SUBSEQUENT PRENATAL CARE: CPT | Performed by: OBSTETRICS & GYNECOLOGY

## 2021-12-01 LAB
ABO + RH BLD: NORMAL
BACTERIA SPEC CULT: NORMAL
BLOOD BANK CMNT PATIENT-IMP: NORMAL
BLOOD GROUP ANTIBODIES SERPL: NORMAL
ERYTHROCYTE [DISTWIDTH] IN BLOOD BY AUTOMATED COUNT: 14.7 % (ref 11.5–14.5)
HBV SURFACE AG SER QL: <0.1 INDEX
HBV SURFACE AG SER QL: NEGATIVE
HCT VFR BLD AUTO: 38 % (ref 35–47)
HCV AB SERPL QL IA: NONREACTIVE
HGB BLD-MCNC: 12.1 G/DL (ref 11.5–16)
HIV 1+2 AB+HIV1 P24 AG SERPL QL IA: NONREACTIVE
HIV12 RESULT COMMENT, HHIVC: NORMAL
MCH RBC QN AUTO: 25.9 PG (ref 26–34)
MCHC RBC AUTO-ENTMCNC: 31.8 G/DL (ref 30–36.5)
MCV RBC AUTO: 81.2 FL (ref 80–99)
NRBC # BLD: 0 K/UL (ref 0–0.01)
NRBC BLD-RTO: 0 PER 100 WBC
PLATELET # BLD AUTO: 345 K/UL (ref 150–400)
PMV BLD AUTO: 10.4 FL (ref 8.9–12.9)
RBC # BLD AUTO: 4.68 M/UL (ref 3.8–5.2)
RUBV IGG SER-IMP: REACTIVE
RUBV IGG SERPL IA-ACNC: 27.4 IU/ML
SERVICE CMNT-IMP: NORMAL
SPECIMEN EXP DATE BLD: NORMAL
WBC # BLD AUTO: 8.9 K/UL (ref 3.6–11)

## 2021-12-03 LAB
C TRACH RRNA SPEC QL NAA+PROBE: NEGATIVE
N GONORRHOEA RRNA SPEC QL NAA+PROBE: NEGATIVE
T VAGINALIS DNA SPEC QL NAA+PROBE: NEGATIVE

## 2021-12-04 LAB — TREPONEMA PALLIDUM IGG+IGM AB [PRESENCE] IN SERUM OR PLASMA BY IMMUNOASSAY: NON REACTIVE

## 2021-12-06 RX ORDER — ONDANSETRON 8 MG/1
8 TABLET, ORALLY DISINTEGRATING ORAL
Qty: 30 TABLET | Refills: 5 | Status: ON HOLD | OUTPATIENT
Start: 2021-12-06 | End: 2022-07-09

## 2021-12-07 ENCOUNTER — TELEPHONE (OUTPATIENT)
Dept: OBGYN CLINIC | Age: 31
End: 2021-12-07

## 2021-12-07 NOTE — TELEPHONE ENCOUNTER
Call received at 8:40am  [address] patient     33year old patient last seen in the 11/30/2021    Patient is 91Mallika Skelton pregnant . Patient sent this message this am and called the office      Dimitrios Castro  to Lavonia Dakin, MD    KB      5:40 PM  I started spotting this evening while I was at work. The toilet paper was all tinged pink. Slight cramps hardly noticeable. Only happened a couple times when I peed. I called the on call doctor and he said it was nothing to worry about. I feel concerned from my last miscarriage he said to see if I needed to be seen for this. Please let me know thanks. Patient reports she has had vaginal spotting last evening , pink in color and has mild cramping. Patient states the spotting starts and stops. Patient denies recent intercourse and had some BM but is having to strain due to constipation    Patient was advised to increase po fluids, rest and was advised to take colace bid for constipation    Patient is wondering if she can be seen today in the office     ? Need ultrasound, ? Labs?  Ov    Please advise    Thank you

## 2021-12-07 NOTE — TELEPHONE ENCOUNTER
Patient advised of work in MD recommendations and pain and bleeding precautions reviewed  Patient reports she is not sure that the straining is related to the vaginal spotting and would like to be seen    Patient was placed on the schedule for 9:00 am ultrasound then to see MD at 10:30am      Patient was advised to increase her po fluids, rest and can take tylenol for cramping      Patient verbalized understanding.

## 2021-12-07 NOTE — TELEPHONE ENCOUNTER
Message  Received: Today  GREYSON Gimenez, Oswald Matthew RN  Caller: Unspecified (Today,  8:40 AM)  Rehana Doss said that pt does not have to be seen. Susi Blunt should start bowel regimen as you had recommended.    Odilia Witt

## 2021-12-08 ENCOUNTER — ROUTINE PRENATAL (OUTPATIENT)
Dept: OBGYN CLINIC | Age: 31
End: 2021-12-08

## 2021-12-08 VITALS — SYSTOLIC BLOOD PRESSURE: 102 MMHG | DIASTOLIC BLOOD PRESSURE: 63 MMHG | WEIGHT: 172.6 LBS | BODY MASS INDEX: 33.71 KG/M2

## 2021-12-08 DIAGNOSIS — O20.0 THREATENED ABORTION AFFECTING INTRAUTERINE PREGNANCY: Primary | ICD-10-CM

## 2021-12-08 PROCEDURE — 99212 OFFICE O/P EST SF 10 MIN: CPT | Performed by: OBSTETRICS & GYNECOLOGY

## 2021-12-08 NOTE — PROGRESS NOTES
TV ULTRASOUND PERFORMED  A SINGLE VIABLE 9W0D IUP IS SEEN WITH NORMAL CARDIAC RHYTHM. THERE APPEARS TO BE A [address] SEEN ADJACENT TO THE GS MEASURING 26 X 9 X 22MM. A NORMAL YOLK SAC IS SEEN. RIGHT OVARY APPEARS WITHIN NORMAL LIMITS. LEFT OVARY APPEARS TO HAVE A CL CYST.  NO FREE FLUID SEEN IN THE CDS. Problem List  Date Reviewed: 11/30/2021          Codes Class Noted    Supervision of normal pregnancy ICD-10-CM: Z34.90  ICD-9-CM: V22.1  11/30/2021    Overview Signed 12/3/2021  7:25 AM by Ernesto Estrada     Intrauterine pregnancy with the following problems identified:   [facility] 7/14/2022 D=US  Covid vaccine booster due Jan 11  Flu vaccine 11/30/2021  NIPTS? Horizon? Hx of CS x 2 - repeat at term  HSV II - suppression at 36 weeks  Delayed PPH PPD 5 - transfusion  [place]ruise 1/22             Fatty liver ICD-10-CM: K76.0  ICD-9-CM: 571.8  1/20/2020    Overview Signed 1/20/2020 11:48 AM by Jiles Sacks, PA-C     Ultrasound 1/2020             Adult situational stress disorder ICD-10-CM: F43.20  ICD-9-CM: 308.9  9/20/2017        Glucose intolerance (impaired glucose tolerance) ICD-10-CM: R73.02  ICD-9-CM: 790.22  Unknown        IBD (inflammatory bowel disease) ICD-10-CM: K52.9  ICD-9-CM: 558. 9  Unknown        Anxiety ICD-10-CM: F41.9  ICD-9-CM: 300.00  Unknown        ADHD (attention deficit hyperactivity disorder) ICD-10-CM: F90.9  ICD-9-CM: 314.01  2/23/2012        Insomnia ICD-10-CM: G47.00  ICD-9-CM: 780.52  4/3/2010        Depression ICD-10-CM: F32. A  ICD-9-CM: 955  4/3/2010

## 2021-12-08 NOTE — PROGRESS NOTES
Threatened AB note    Manny Scott is a ,  33 y.o. female OTHER Patient's last menstrual period was 10/06/2021 (exact date). making her 9 weeks pregnant. She has had prenatal care. She presents with spotting and cramping that started 2 days ago . The amount of bleeding is described as light . The cramps are described as the same. She has not passed tissue. Now looks like old blood. She has had a recent pelvic ultrasound that showed   . TV ULTRASOUND PERFORMED  A SINGLE VIABLE 9W0D IUP IS SEEN WITH NORMAL CARDIAC RHYTHM. THERE APPEARS TO BE A [address] SEEN ADJACENT TO THE GS MEASURING 26 X 9 X 22MM. A NORMAL YOLK SAC IS SEEN. RIGHT OVARY APPEARS WITHIN NORMAL LIMITS. LEFT OVARY APPEARS TO HAVE A CL CYST.  NO FREE FLUID SEEN IN THE CDS    Her past medical history is not significant for risk factors for ectopic pregnancy. She has not had pelvic pain. The patient specifically denies right or left pelvic pain. She does have a history of a spontaneous . She has not had a recent injury or trauma. Additional complaints: none.      Past Medical History:   Diagnosis Date    ADHD (attention deficit hyperactivity disorder)     Anxiety     Bipolar 1 disorder ([facility] [address]     Depression 4/3/2010    Glucose intolerance (impaired glucose tolerance)     IBD (inflammatory bowel disease)     Insomnia 4/3/2010     Past Surgical History:   Procedure Laterality Date    HX  SECTION      AZ BREAST SURGERY PROCEDURE UNLISTED       Social History     Occupational History    Occupation: [employer]   Tobacco Use    Smoking status: Former Smoker     Packs/day: 0.30     Years: 3.00     Pack years: 0.90     Types: Cigarettes    Smokeless tobacco: Never Used   Substance and Sexual Activity    Alcohol use: No     Alcohol/week: 0.0 standard drinks    Drug use: No    Sexual activity: Yes     Partners: Male     Family History   Adopted: Yes       Allergies   Allergen Reactions    Cephalexin Rash    Pcn [Penicillins] Rash     Prior to Admission medications    Medication Sig Start Date End Date Taking? Authorizing Provider   ondansetron (ZOFRAN ODT) 8 mg disintegrating tablet Take 1 Tablet by mouth every eight (8) hours as needed for Nausea or Vomiting. 12/6/21   [place] marie Tomlinson MD   prenatal vit-iron fumarate-fa 27 mg iron- 0.8 mg tab tablet Take 1 Tablet by mouth daily. 11/30/21   [place] marie Tomlinson MD   miSOPROStoL (CYTOTEC) 200 mcg tablet 5 pills in vagina at bedtime, repeat in 12 hours and lie down for 2 hours if no result 8/5/21   Guillermo Tomlinson MD   ondansetron (ZOFRAN ODT) 8 mg disintegrating tablet Take 1 Tablet by mouth every eight (8) hours as needed for Nausea or Vomiting. 8/5/21   Guillermo Tomlinson MD   ondansetron hcl (ZOFRAN) 4 mg tablet TAKE 1 TABLET BY MOUTH EVERY 8 HOURS AS NEEDED FOR NAUSEA OR VOMITING 4/14/21   Carlos Pete PA-C   loperamide (IMODIUM) 2 mg capsule TAKE 1 CAPSULE BY MOUTH FOUR TIMES DAILY AS NEEDED FOR DIARRHEA 12/8/20   Carlos Pete PA-C   buPROPion XL (WELLBUTRIN XL) 150 mg tablet Take 1 Tab by mouth every morning. 12/8/20   Carlos Pete PA-C   NORETHINDRONE ACETATE PO norethindrone (contraceptive) 0.35 mg tablet    Provider, Historical   mometasone (ELOCON) 0.1 % ointment Apply  to affected area daily. 3/29/18   Cassandra Cuevas PA-C   valACYclovir (VALTREX) 500 mg tablet Take  by mouth two (2) times a day.     Provider, Historical        Review of Systems: History obtained from the patient  Constitutional: negative for weight loss, fever, night sweats  Breast: negative for breast lumps, nipple discharge, galactorrhea  GI: negative for change in bowel habits, abdominal pain, black or bloody stools  : negative for frequency, dysuria, hematuria, vaginal discharge  MSK: negative for back pain, joint pain, muscle pain  Skin: negative for itching, rash, hives  Psych: negative for anxiety, depression, change in mood      Objective:  Visit Vitals  /63   Wt 172 lb 9.6 oz (78.3 kg)   LMP 10/06/2021 (Exact Date)   BMI 33.71 kg/m²       Physical Exam:   PHYSICAL EXAMINATION    Constitutional  · Appearance: well-nourished, well developed, alert, in no acute distress    Gastrointestinal  · Abdominal Examination: abdomen non-tender to palpation, normal bowel sounds, no masses present  · Liver and spleen: no hepatomegaly present, spleen not palpable  · Hernias: no hernias identified    Genitourinary  · External Genitalia: normal appearance for age, no discharge present, no tenderness present, no inflammatory lesions present, no masses present, no atrophy present  · Vagina: normal vaginal vault without central or paravaginal defects, bloody discharge present, no inflammatory lesions present, no masses present  · Bladder: non-tender to palpation  · Urethra: appears normal  · Cervix: normal   · Uterus: enlarged, soft, mildly tender with normal shape and consistency  · Adnexa: no adnexal tenderness present, no adnexal masses present  · Perineum: perineum within normal limits, no evidence of trauma, no rashes or skin lesions present  · Anus: anus within normal limits, no hemorrhoids present  · Inguinal Lymph Nodes: no lymphadenopathy present    Skin  · General Inspection: no rash, no lesions identified    Neurologic/Psychiatric  · Mental Status:  · Orientation: grossly oriented to person, place and time  · Mood and Affect: mood normal, affect appropriate    Assessment:   Threatened AB  O pos  [facility]    Plan:   Bleeding precautions  Disc management of constipation  Keep normal fu

## 2021-12-27 ENCOUNTER — ROUTINE PRENATAL (OUTPATIENT)
Dept: OBGYN CLINIC | Age: 31
End: 2021-12-27
Payer: COMMERCIAL

## 2021-12-27 VITALS — SYSTOLIC BLOOD PRESSURE: 112 MMHG | WEIGHT: 173 LBS | DIASTOLIC BLOOD PRESSURE: 74 MMHG | BODY MASS INDEX: 33.79 KG/M2

## 2021-12-27 DIAGNOSIS — Z34.80 SUPERVISION OF OTHER NORMAL PREGNANCY, ANTEPARTUM: Primary | ICD-10-CM

## 2021-12-27 PROCEDURE — 0502F SUBSEQUENT PRENATAL CARE: CPT | Performed by: OBSTETRICS & GYNECOLOGY

## 2021-12-27 NOTE — PROGRESS NOTES
Problem List  Date Reviewed: 12/8/2021          Codes Class Noted    Supervision of normal pregnancy ICD-10-CM: Z34.90  ICD-9-CM: V22.1  11/30/2021    Overview Signed 12/3/2021  7:25 AM by Nat Berry     Intrauterine pregnancy with the following problems identified:   [facility] 7/14/2022 D=US  Covid vaccine booster due Jan 11  Flu vaccine 11/30/2021  NIPTS? Horizon? Hx of CS x 2 - repeat at term  HSV II - suppression at 36 weeks  Delayed PPH PPD 5 - transfusion  Cruise 1/22             Fatty liver ICD-10-CM: K76.0  ICD-9-CM: 571.8  1/20/2020    Overview Signed 1/20/2020 11:48 AM by Cindy Maldonado PA-C     Ultrasound 1/2020             Adult situational stress disorder ICD-10-CM: F43.20  ICD-9-CM: 308.9  9/20/2017        Glucose intolerance (impaired glucose tolerance) ICD-10-CM: R73.02  ICD-9-CM: 790.22  Unknown        IBD (inflammatory bowel disease) ICD-10-CM: K52.9  ICD-9-CM: 558. 9  Unknown        Anxiety ICD-10-CM: F41.9  ICD-9-CM: 300.00  Unknown        ADHD (attention deficit hyperactivity disorder) ICD-10-CM: F90.9  ICD-9-CM: 314.01  2/23/2012        Insomnia ICD-10-CM: G47.00  ICD-9-CM: 780.52  4/3/2010        Depression ICD-10-CM: F32. A  ICD-9-CM: 502  4/3/2010

## 2021-12-27 NOTE — PROGRESS NOTES
Doing well  NIPTS/Horizon today  Hx of [facility] - had some old spotting  AFP next visit

## 2022-01-24 ENCOUNTER — ROUTINE PRENATAL (OUTPATIENT)
Dept: OBGYN CLINIC | Age: 32
End: 2022-01-24
Payer: COMMERCIAL

## 2022-01-24 VITALS
WEIGHT: 177 LBS | DIASTOLIC BLOOD PRESSURE: 67 MMHG | HEIGHT: 60 IN | SYSTOLIC BLOOD PRESSURE: 118 MMHG | BODY MASS INDEX: 34.75 KG/M2

## 2022-01-24 DIAGNOSIS — Z34.80 SUPERVISION OF OTHER NORMAL PREGNANCY, ANTEPARTUM: Primary | ICD-10-CM

## 2022-01-24 PROCEDURE — 0502F SUBSEQUENT PRENATAL CARE: CPT | Performed by: OBSTETRICS & GYNECOLOGY

## 2022-01-24 NOTE — PROGRESS NOTES
Problem List  Date Reviewed: 12/27/2021          Codes Class Noted    Supervision of normal pregnancy ICD-10-CM: Z34.90  ICD-9-CM: V22.1  11/30/2021    Overview Addendum 1/11/2022  8:18 AM by Shahriar Morris     Intrauterine pregnancy with the following problems identified:   [address] 7/14/2022 D=US  Covid vaccine booster due Jan 11  Flu vaccine 11/30/2021  NIPTS- normal female  Horizon- neg  Hx of CS x 2 - repeat at term  HSV II - suppression at 36 weeks  Delayed PPH PPD 5 - transfusion  Cruise 1/22             Fatty liver ICD-10-CM: K76.0  ICD-9-CM: 571.8  1/20/2020    Overview Signed 1/20/2020 11:48 AM by Sawyer Lewis PA-C     Ultrasound 1/2020             Adult situational stress disorder ICD-10-CM: F43.20  ICD-9-CM: 308.9  9/20/2017        Glucose intolerance (impaired glucose tolerance) ICD-10-CM: R73.02  ICD-9-CM: 790.22  Unknown        IBD (inflammatory bowel disease) ICD-10-CM: K52.9  ICD-9-CM: 558. 9  Unknown        Anxiety ICD-10-CM: F41.9  ICD-9-CM: 300.00  Unknown        ADHD (attention deficit hyperactivity disorder) ICD-10-CM: F90.9  ICD-9-CM: 314.01  2/23/2012        Insomnia ICD-10-CM: G47.00  ICD-9-CM: 780.52  4/3/2010        Depression ICD-10-CM: F32. A  ICD-9-CM: 676  4/3/2010

## 2022-01-26 LAB
AFP ADJ MOM SERPL: 1.63
AFP INTERP SERPL-IMP: NORMAL
AFP INTERP SERPL-IMP: NORMAL
AFP SERPL-MCNC: 43.4 NG/ML
AGE AT DELIVERY: 34.1 YR
COMMENT, 018013: NORMAL
GA METHOD: NORMAL
GA: 15 WEEKS
IDDM PATIENT QL: NO
MULTIPLE PREGNANCY: NO
NEURAL TUBE DEFECT RISK FETUS: 1944 %
RESULTS, 017004: NORMAL

## 2022-02-24 ENCOUNTER — ROUTINE PRENATAL (OUTPATIENT)
Dept: OBGYN CLINIC | Age: 32
End: 2022-02-24

## 2022-02-24 VITALS — SYSTOLIC BLOOD PRESSURE: 108 MMHG | WEIGHT: 177 LBS | BODY MASS INDEX: 34.57 KG/M2 | DIASTOLIC BLOOD PRESSURE: 53 MMHG

## 2022-02-24 DIAGNOSIS — Z34.80 SUPERVISION OF OTHER NORMAL PREGNANCY, ANTEPARTUM: Primary | ICD-10-CM

## 2022-02-24 DIAGNOSIS — O99.212 OBESITY AFFECTING PREGNANCY IN SECOND TRIMESTER: ICD-10-CM

## 2022-02-24 PROCEDURE — 0502F SUBSEQUENT PRENATAL CARE: CPT | Performed by: OBSTETRICS & GYNECOLOGY

## 2022-02-24 NOTE — PROGRESS NOTES
US today limited by patient habitus, appears normal, cannot get cardiac views  Baby moving  Needs fu views in one month - will get at [facility]     got covid post cruise  Having hard time taking the prescription vitamins and switched back to the Gummies.   She required a blood transfusion in her last pregnancy and discussed the importance of taking the prescription vitamins to make sure her hemoglobin is normal at the time of delivery

## 2022-02-24 NOTE — PROGRESS NOTES
FETAL SURVEY  DECREASED SCAN CLARITY DUE TO INCREASED BMI. A SINGLE VIABLE IUP AT 20W1D GA BY LMP IS SEEN. FETAL CARDIAC MOTION OBSERVED. FETAL ANATOMY POORLY VISUALIZED DUE TO PATIENT BODY HABITUS. FACE, NOSE/LIPS, CSP, LAT VENT, CER/CM, CP, SPINE, KIDNEYS, STOMACH/DIAPHRAGM, BLADDER, 3VC, CORD  INSERTION, ARMS, LEGS, HANDS, FEET. ANATOMY NOT SEEN: ALL CARDIAC, PROFILE. FOLLOW UP NEEDED. APPROPRIATE FETAL GROWTH IS SEEN. SIZE=DATES. CHRISTIE, CERVIX AND PLACENTA APPEAR WITHIN NORMAL LIMITS. GENDER: XX    Problem List  Date Reviewed: 1/24/2022          Codes Class Noted    Supervision of normal pregnancy ICD-10-CM: Z34.90  ICD-9-CM: V22.1  11/30/2021    Overview Addendum 1/27/2022  8:00 AM by Titi Rodrigez     Intrauterine pregnancy with the following problems identified:   [facility] 7/14/2022 D=US  Covid vaccine booster due Jan 11  Flu vaccine 11/30/2021  NIPTS- normal female  Horizon- neg  Hx of CS x 2 - repeat at term  HSV II - suppression at 36 weeks  Delayed PPH PPD 5 - transfusion  Cruise 1/22  AFP- neg             Fatty liver ICD-10-CM: K76.0  ICD-9-CM: 571.8  1/20/2020    Overview Signed 1/20/2020 11:48 AM by Sirisha Arechiga PA-C     Ultrasound 1/2020             Adult situational stress disorder ICD-10-CM: F43.20  ICD-9-CM: 308.9  9/20/2017        Glucose intolerance (impaired glucose tolerance) ICD-10-CM: R73.02  ICD-9-CM: 790.22  Unknown        IBD (inflammatory bowel disease) ICD-10-CM: K52.9  ICD-9-CM: 558. 9  Unknown        Anxiety ICD-10-CM: F41.9  ICD-9-CM: 300.00  Unknown        ADHD (attention deficit hyperactivity disorder) ICD-10-CM: F90.9  ICD-9-CM: 314.01  2/23/2012        Insomnia ICD-10-CM: G47.00  ICD-9-CM: 780.52  4/3/2010        Depression ICD-10-CM: F32. A  ICD-9-CM: 031  4/3/2010

## 2022-03-18 PROBLEM — Z34.90 SUPERVISION OF NORMAL PREGNANCY: Status: ACTIVE | Noted: 2021-11-30

## 2022-03-19 PROBLEM — F43.20 ADULT SITUATIONAL STRESS DISORDER: Status: ACTIVE | Noted: 2017-09-20

## 2022-03-19 PROBLEM — K76.0 FATTY LIVER: Status: ACTIVE | Noted: 2020-01-20

## 2022-03-24 ENCOUNTER — HOSPITAL ENCOUNTER (OUTPATIENT)
Dept: PERINATAL CARE | Age: 32
Discharge: HOME | End: 2022-03-24
Attending: OBSTETRICS & GYNECOLOGY
Payer: COMMERCIAL

## 2022-03-24 ENCOUNTER — ROUTINE PRENATAL (OUTPATIENT)
Dept: OBGYN CLINIC | Age: 32
End: 2022-03-24
Payer: COMMERCIAL

## 2022-03-24 VITALS — DIASTOLIC BLOOD PRESSURE: 68 MMHG | SYSTOLIC BLOOD PRESSURE: 102 MMHG | WEIGHT: 180.8 LBS | BODY MASS INDEX: 35.31 KG/M2

## 2022-03-24 DIAGNOSIS — Z34.80 SUPERVISION OF OTHER NORMAL PREGNANCY, ANTEPARTUM: Primary | ICD-10-CM

## 2022-03-24 PROCEDURE — 0502F SUBSEQUENT PRENATAL CARE: CPT | Performed by: OBSTETRICS & GYNECOLOGY

## 2022-03-24 PROCEDURE — 76811 OB US DETAILED SNGL FETUS: CPT | Performed by: OBSTETRICS & GYNECOLOGY

## 2022-03-24 NOTE — PROGRESS NOTES
Problem List  Date Reviewed: 2/24/2022          Codes Class Noted    Supervision of normal pregnancy ICD-10-CM: Z34.90  ICD-9-CM: V22.1  11/30/2021    Overview Addendum 1/27/2022  8:00 AM by Josafat Houston     Intrauterine pregnancy with the following problems identified:   [facility] 7/14/2022 D=US  Covid vaccine booster due Jan 11  Flu vaccine 11/30/2021  NIPTS- normal female  Horizon- neg  Hx of CS x 2 - repeat at term  HSV II - suppression at 36 weeks  Delayed PPH PPD 5 - transfusion  Cruise 1/22  AFP- neg             Fatty liver ICD-10-CM: K76.0  ICD-9-CM: 571.8  1/20/2020    Overview Signed 1/20/2020 11:48 AM by Jadon Torres PA-C     Ultrasound 1/2020             Adult situational stress disorder ICD-10-CM: F43.20  ICD-9-CM: 308.9  9/20/2017        Glucose intolerance (impaired glucose tolerance) ICD-10-CM: R73.02  ICD-9-CM: 790.22  Unknown        IBD (inflammatory bowel disease) ICD-10-CM: K52.9  ICD-9-CM: 558. 9  Unknown        Anxiety ICD-10-CM: F41.9  ICD-9-CM: 300.00  Unknown        ADHD (attention deficit hyperactivity disorder) ICD-10-CM: F90.9  ICD-9-CM: 314.01  2/23/2012        Insomnia ICD-10-CM: G47.00  ICD-9-CM: 780.52  4/3/2010        Depression ICD-10-CM: F32. A  ICD-9-CM: 443  4/3/2010

## 2022-04-14 RX ORDER — LOPERAMIDE HCL 2 MG
2 TABLET ORAL
Qty: 20 TABLET | Refills: 1 | Status: SHIPPED | OUTPATIENT
Start: 2022-04-14 | End: 2022-04-24

## 2022-04-15 NOTE — PATIENT INSTRUCTIONS
Weeks 26 to 30 of Your Pregnancy: Care Instructions  Overview     You are now entering your last trimester of pregnancy. Your baby is growing quickly. Marilee Pettit probably feel your baby moving around more often. Your doctor may ask you to count your baby's kicks. Your back may ache as your body gets used to your baby's size and length. If you haven't already had the Tdap shot during this pregnancy, talk to your doctor about getting it. It will help protect your  against pertussis infection. During this time, it's important to take care of yourself and pay attention to what your body needs. If you feel sexual, you can explore ways to be close with your partner that match your comfort and desire. Follow-up care is a key part of your treatment and safety. Be sure to make and go to all appointments, and call your doctor if you are having problems. It's also a good idea to know your test results and keep a list of the medicines you take. How can you care for yourself at home? Take it easy at work  · Take frequent breaks. If possible, stop working when you are tired, and rest during your lunch hour. · Take bathroom breaks every 2 hours. · Change positions often. If you sit for long periods, stand up and walk around. · When you stand for a long time, keep one foot on a low stool with your knee bent. After standing a lot, sit with your feet up. · Avoid fumes, chemicals, and tobacco smoke. Be sexual in your own way  · Having sex during pregnancy is okay, unless your doctor tells you not to. · You may be very interested in sex, or you may have no interest at all. · Your growing belly can make it hard to find a good position during intercourse. [place] and explore. · You may get cramps in your uterus when your partner touches your breasts. · A back rub may relieve the backache or cramps that sometimes follow orgasm. Learn about  labor  · Watch for signs of  labor.  You may be going into labor if:  ? You have menstrual-like cramps, with or without nausea. ? You have about 6 or more contractions in 1 hour, even after you have had a glass of water and are resting. ? You have a low, dull backache that does not go away when you change your position. ? You have pain or pressure in your pelvis that comes and goes in a pattern. ? You have intestinal cramping or flu-like symptoms, with or without diarrhea.  ? You notice an increase or change in your vaginal discharge. Discharge may be heavy, mucus-like, watery, or streaked with blood. ? Your water breaks. · If you think you have  labor:  ? Drink 2 or 3 glasses of water or juice. Not drinking enough fluids can cause contractions. ? Stop what you are doing, and empty your bladder. Then lie down on your left side for at least 1 hour. ? While lying on your side, find your breast bone. Put your fingers in the soft spot just below it. Move your fingers down toward your belly button to find the top of your uterus. Check to see if it is tight. ? Contractions can be weak or strong. Record your contractions for an hour. Time a contraction from the start of one contraction to the start of the next one.  ? Single or several strong contractions without a pattern are called Jose-Hernandez contractions. They are practice contractions but not the start of labor. They often stop if you change what you are doing. ? Call your doctor if you have regular contractions. Where can you learn more? Go to http://www.gray.com/  Enter Z710 in the search box to learn more about \"Weeks 26 to 30 of Your Pregnancy: Care Instructions. \"  Current as of: 2021               Content Version: 13.2  © 8578-6103 [employer]. Care instructions adapted under license by [employer] (which disclaims liability or warranty for this information).  If you have questions about a medical condition or this instruction, always ask your healthcare professional. [address] any warranty or liability for your use of this information.

## 2022-04-18 ENCOUNTER — ROUTINE PRENATAL (OUTPATIENT)
Dept: OBGYN CLINIC | Age: 32
End: 2022-04-18
Payer: COMMERCIAL

## 2022-04-18 VITALS — DIASTOLIC BLOOD PRESSURE: 74 MMHG | SYSTOLIC BLOOD PRESSURE: 112 MMHG | WEIGHT: 184 LBS | BODY MASS INDEX: 35.94 KG/M2

## 2022-04-18 DIAGNOSIS — Z34.80 SUPERVISION OF OTHER NORMAL PREGNANCY, ANTEPARTUM: Primary | ICD-10-CM

## 2022-04-18 DIAGNOSIS — Z23 ENCOUNTER FOR IMMUNIZATION: ICD-10-CM

## 2022-04-18 DIAGNOSIS — Z36.9 ENCOUNTER FOR ANTENATAL SCREENING OF MOTHER: ICD-10-CM

## 2022-04-18 PROCEDURE — 90715 TDAP VACCINE 7 YRS/> IM: CPT | Performed by: OBSTETRICS & GYNECOLOGY

## 2022-04-18 PROCEDURE — 0502F SUBSEQUENT PRENATAL CARE: CPT | Performed by: OBSTETRICS & GYNECOLOGY

## 2022-04-18 PROCEDURE — 90471 IMMUNIZATION ADMIN: CPT | Performed by: OBSTETRICS & GYNECOLOGY

## 2022-04-18 NOTE — PROGRESS NOTES
Problem List  Date Reviewed: 3/24/2022          Codes Class Noted    Supervision of normal pregnancy ICD-10-CM: Z34.90  ICD-9-CM: V22.1  11/30/2021    Overview Addendum 1/27/2022  8:00 AM by Aj Bowen     Intrauterine pregnancy with the following problems identified:   [facility] 7/14/2022 D=US  Covid vaccine booster due Jan 11  Flu vaccine 11/30/2021  NIPTS- normal female  Horizon- neg  Hx of CS x 2 - repeat at term  HSV II - suppression at 36 weeks  Delayed PPH PPD 5 - transfusion  Cruise 1/22  AFP- neg             Fatty liver ICD-10-CM: K76.0  ICD-9-CM: 571.8  1/20/2020    Overview Signed 1/20/2020 11:48 AM by Christie Tran PA-C     Ultrasound 1/2020             Adult situational stress disorder ICD-10-CM: F43.20  ICD-9-CM: 308.9  9/20/2017        Glucose intolerance (impaired glucose tolerance) ICD-10-CM: R73.02  ICD-9-CM: 790.22  Unknown        IBD (inflammatory bowel disease) ICD-10-CM: K52.9  ICD-9-CM: 558. 9  Unknown        Anxiety ICD-10-CM: F41.9  ICD-9-CM: 300.00  Unknown        ADHD (attention deficit hyperactivity disorder) ICD-10-CM: F90.9  ICD-9-CM: 314.01  2/23/2012        Insomnia ICD-10-CM: G47.00  ICD-9-CM: 780.52  4/3/2010        Depression ICD-10-CM: F32. A  ICD-9-CM: 897  4/3/2010

## 2022-04-19 LAB
BASOPHILS # BLD: 0 K/UL (ref 0–0.1)
BASOPHILS NFR BLD: 1 % (ref 0–1)
DIFFERENTIAL METHOD BLD: ABNORMAL
EOSINOPHIL # BLD: 0 K/UL (ref 0–0.4)
EOSINOPHIL NFR BLD: 1 % (ref 0–7)
ERYTHROCYTE [DISTWIDTH] IN BLOOD BY AUTOMATED COUNT: 16.2 % (ref 11.5–14.5)
GLUCOSE 1H P 100 G GLC PO SERPL-MCNC: 121 MG/DL (ref 65–140)
HCT VFR BLD AUTO: 35.1 % (ref 35–47)
HGB BLD-MCNC: 10.6 G/DL (ref 11.5–16)
IMM GRANULOCYTES # BLD AUTO: 0.1 K/UL (ref 0–0.04)
IMM GRANULOCYTES NFR BLD AUTO: 1 % (ref 0–0.5)
LYMPHOCYTES # BLD: 1.6 K/UL (ref 0.8–3.5)
LYMPHOCYTES NFR BLD: 19 % (ref 12–49)
MCH RBC QN AUTO: 25.8 PG (ref 26–34)
MCHC RBC AUTO-ENTMCNC: 30.2 G/DL (ref 30–36.5)
MCV RBC AUTO: 85.4 FL (ref 80–99)
MONOCYTES # BLD: 0.4 K/UL (ref 0–1)
MONOCYTES NFR BLD: 5 % (ref 5–13)
NEUTS SEG # BLD: 6.6 K/UL (ref 1.8–8)
NEUTS SEG NFR BLD: 73 % (ref 32–75)
NRBC # BLD: 0 K/UL (ref 0–0.01)
NRBC BLD-RTO: 0 PER 100 WBC
PLATELET # BLD AUTO: 281 K/UL (ref 150–400)
PMV BLD AUTO: 11.3 FL (ref 8.9–12.9)
RBC # BLD AUTO: 4.11 M/UL (ref 3.8–5.2)
WBC # BLD AUTO: 8.8 K/UL (ref 3.6–11)

## 2022-04-20 RX ORDER — LANOLIN ALCOHOL/MO/W.PET/CERES
325 CREAM (GRAM) TOPICAL
Qty: 90 TABLET | Refills: 4 | Status: SHIPPED | OUTPATIENT
Start: 2022-04-20 | End: 2022-08-02

## 2022-05-02 ENCOUNTER — ROUTINE PRENATAL (OUTPATIENT)
Dept: OBGYN CLINIC | Age: 32
End: 2022-05-02
Payer: COMMERCIAL

## 2022-05-02 VITALS — DIASTOLIC BLOOD PRESSURE: 61 MMHG | BODY MASS INDEX: 36.33 KG/M2 | SYSTOLIC BLOOD PRESSURE: 111 MMHG | WEIGHT: 186 LBS

## 2022-05-02 DIAGNOSIS — Z34.80 SUPERVISION OF OTHER NORMAL PREGNANCY, ANTEPARTUM: Primary | ICD-10-CM

## 2022-05-02 PROCEDURE — 0502F SUBSEQUENT PRENATAL CARE: CPT | Performed by: OBSTETRICS & GYNECOLOGY

## 2022-05-02 NOTE — PATIENT INSTRUCTIONS
Weeks 26 to 30 of Your Pregnancy: Care Instructions  Overview     You are now entering your last trimester of pregnancy. Your baby is growing quickly. Nette Mcintosh probably feel your baby moving around more often. Your doctor may ask you to count your baby's kicks. Your back may ache as your body gets used to your baby's size and length. If you haven't already had the Tdap shot during this pregnancy, talk to your doctor about getting it. It will help protect your  against pertussis infection. During this time, it's important to take care of yourself and pay attention to what your body needs. If you feel sexual, you can explore ways to be close with your partner that match your comfort and desire. Follow-up care is a key part of your treatment and safety. Be sure to make and go to all appointments, and call your doctor if you are having problems. It's also a good idea to know your test results and keep a list of the medicines you take. How can you care for yourself at home? Take it easy at work  · Take frequent breaks. If possible, stop working when you are tired, and rest during your lunch hour. · Take bathroom breaks every 2 hours. · Change positions often. If you sit for long periods, stand up and walk around. · When you stand for a long time, keep one foot on a low stool with your knee bent. After standing a lot, sit with your feet up. · Avoid fumes, chemicals, and tobacco smoke. Be sexual in your own way  · Having sex during pregnancy is okay, unless your doctor tells you not to. · You may be very interested in sex, or you may have no interest at all. · Your growing belly can make it hard to find a good position during intercourse. [place] and explore. · You may get cramps in your uterus when your partner touches your breasts. · A back rub may relieve the backache or cramps that sometimes follow orgasm. Learn about  labor  · Watch for signs of  labor.  You may be going into labor if:  ? You have menstrual-like cramps, with or without nausea. ? You have about 6 or more contractions in 1 hour, even after you have had a glass of water and are resting. ? You have a low, dull backache that does not go away when you change your position. ? You have pain or pressure in your pelvis that comes and goes in a pattern. ? You have intestinal cramping or flu-like symptoms, with or without diarrhea.  ? You notice an increase or change in your vaginal discharge. Discharge may be heavy, mucus-like, watery, or streaked with blood. ? Your water breaks. · If you think you have  labor:  ? Drink 2 or 3 glasses of water or juice. Not drinking enough fluids can cause contractions. ? Stop what you are doing, and empty your bladder. Then lie down on your left side for at least 1 hour. ? While lying on your side, find your breast bone. Put your fingers in the soft spot just below it. Move your fingers down toward your belly button to find the top of your uterus. Check to see if it is tight. ? Contractions can be weak or strong. Record your contractions for an hour. Time a contraction from the start of one contraction to the start of the next one.  ? Single or several strong contractions without a pattern are called Jose-Hernandez contractions. They are practice contractions but not the start of labor. They often stop if you change what you are doing. ? Call your doctor if you have regular contractions. Where can you learn more? Go to http://www.gray.com/  Enter Q864 in the search box to learn more about \"Weeks 26 to 30 of Your Pregnancy: Care Instructions. \"  Current as of: 2021               Content Version: 13.2  © 9728-8528 [employer]. Care instructions adapted under license by [employer] (which disclaims liability or warranty for this information).  If you have questions about a medical condition or this instruction, always ask your healthcare professional. [address] any warranty or liability for your use of this information.

## 2022-05-02 NOTE — PROGRESS NOTES
Intrauterine pregnancy with the following problems identified:   [facility] 7/14/2022 D=US  Covid vaccine booster due Jan 11  Flu vaccine 11/30/2021  NIPTS- normal female  Horizon- neg  Hx of CS x 2 - repeat at term  HSV II - suppression at 36 weeks  Delayed PPH PPD 5 - transfusion  Cruise 1/22  AFP- neg  Anemia  Repeat C/S 7/6/22

## 2022-05-16 NOTE — PROGRESS NOTES
[address] 7/14/2022 D=US  Covid vaccine booster due Jan 11  Flu vaccine 11/30/2021  NIPTS- normal female  Horizon- neg  Hx of CS x 2 - repeat at term  HSV II - suppression at 36 weeks  Delayed PPH PPD 5 - transfusion  Cruise 1/22  AFP- neg  Anemia  Repeat C/S 7/6/22

## 2022-05-16 NOTE — PATIENT INSTRUCTIONS
Weeks 32 to 34 of Your Pregnancy: Care Instructions  Overview     During the last few weeks of your pregnancy, you may have more aches and pains. It's important to rest when you can. Your growing baby is putting more pressure on your bladder. So you may need to urinate more often. Hemorrhoids are also common. These are painful, itchy veins in the rectal area. You may want to talk with your doctor about banking your baby's umbilical cord blood. This is the blood left in the cord after birth. If you want to save this blood, you must arrange it ahead of time. You can't decide at the last minute. If you haven't already had the Tdap shot during this pregnancy, talk to your doctor about getting it. It will help protect your  against pertussis infection. Follow-up care is a key part of your treatment and safety. Be sure to make and go to all appointments, and call your doctor if you are having problems. It's also a good idea to know your test results and keep a list of the medicines you take. How can you care for yourself at home? Ease hemorrhoids  · Get more liquids, fruits, vegetables, and fiber in your diet. This will help keep your stools soft. · Avoid sitting for too long. Lie on your left side several times a day. · Clean yourself with soft, moist toilet paper. Or you can use witch hazel pads or personal hygiene pads. · If you are uncomfortable, try ice packs. Or you can sit in a warm sitz bath. Do these for 20 minutes at a time, as needed. · Use hydrocortisone cream for pain and itching. Two examples are Anusol and Preparation H Hydrocortisone. · Ask your doctor about taking an over-the-counter stool softener. Consider breastfeeding  · Experts recommend breastfeeding for 1 year or longer. · Breast milk may help protect your child from some health problems.  babies are less likely than formula-fed babies to:  ? Get ear infections, colds, diarrhea, and pneumonia. ?  Be obese or get diabetes later in life. · Breastfeeding causes the release of a hormone called oxytocin. This hormone may help your uterus shrink back faster. · Breastfeeding may help you lose weight faster. Making milk burns calories. · Breastfeeding can lower your risk of breast cancer, ovarian cancer, and osteoporosis. Decide about circumcision for your baby  · As you make this decision, it may help to think about your personal, Sabianist, and family traditions. You get to decide if you will keep your baby's penis natural or if your baby will be circumcised. · If you decide that you would like to have your baby circumcised, talk with your doctor. You can share your concerns about pain. And you can discuss your preferences for anesthesia. Where can you learn more? Go to http://www.Clean [employer]Q.com/  Enter X711 in the search box to learn more about \"Weeks 32 to 34 of Your Pregnancy: Care Instructions. \"  Current as of: June 16, 2021               Content Version: 13.2  © 4920-2065 [employer]. Care instructions adapted under license by [employer] (which disclaims liability or warranty for this information). If you have questions about a medical condition or this instruction, always ask your healthcare professional. [address] any warranty or liability for your use of this information.

## 2022-05-19 ENCOUNTER — ROUTINE PRENATAL (OUTPATIENT)
Dept: OBGYN CLINIC | Age: 32
End: 2022-05-19
Payer: COMMERCIAL

## 2022-05-19 VITALS
DIASTOLIC BLOOD PRESSURE: 67 MMHG | SYSTOLIC BLOOD PRESSURE: 109 MMHG | BODY MASS INDEX: 36.95 KG/M2 | HEART RATE: 107 BPM | WEIGHT: 189.2 LBS

## 2022-05-19 DIAGNOSIS — Z34.80 SUPERVISION OF OTHER NORMAL PREGNANCY, ANTEPARTUM: Primary | ICD-10-CM

## 2022-05-19 PROCEDURE — 0502F SUBSEQUENT PRENATAL CARE: CPT | Performed by: OBSTETRICS & GYNECOLOGY

## 2022-05-19 NOTE — PROGRESS NOTES
Intrauterine pregnancy with the following problems identified:   [facility] 7/14/2022 D=US  Covid vaccine booster due Jan 11  Flu vaccine 11/30/2021  NIPTS- normal female  Horizon- neg  Hx of CS x 2 - repeat at term  HSV II - suppression at 36 weeks  Delayed PPH PPD 5 - transfusion  Cruise 1/22  AFP- neg  Anemia  Repeat C/S 7/6/2

## 2022-06-08 ENCOUNTER — TELEPHONE (OUTPATIENT)
Dept: OBGYN CLINIC | Age: 32
End: 2022-06-08

## 2022-06-08 ENCOUNTER — ROUTINE PRENATAL (OUTPATIENT)
Dept: OBGYN CLINIC | Age: 32
End: 2022-06-08

## 2022-06-08 VITALS — WEIGHT: 189.6 LBS | BODY MASS INDEX: 37.03 KG/M2 | SYSTOLIC BLOOD PRESSURE: 101 MMHG | DIASTOLIC BLOOD PRESSURE: 69 MMHG

## 2022-06-08 DIAGNOSIS — Z34.80 SUPERVISION OF OTHER NORMAL PREGNANCY, ANTEPARTUM: Primary | ICD-10-CM

## 2022-06-08 DIAGNOSIS — K50.90 CROHN'S DISEASE WITHOUT COMPLICATION, UNSPECIFIED GASTROINTESTINAL TRACT LOCATION (HCC): ICD-10-CM

## 2022-06-08 LAB — GRBS, EXTERNAL: NEGATIVE

## 2022-06-08 PROCEDURE — 0502F SUBSEQUENT PRENATAL CARE: CPT | Performed by: OBSTETRICS & GYNECOLOGY

## 2022-06-08 NOTE — PROGRESS NOTES
US 63%tile, normal AFV, vtx  Baby moving  GBS done  Cervix closed - head in pelvis  Labor prec discussed  CS 7/6

## 2022-06-08 NOTE — PROGRESS NOTES
Intrauterine pregnancy with the following problems identified:   [facility] 7/14/2022 D=US  Covid vaccine booster due Jan 11  Flu vaccine 11/30/2021  NIPTS- normal female  Horizon- neg  Hx of CS x 2 - repeat at term  HSV II - suppression at 36 weeks  Delayed PPH PPD 5 - transfusion  Cruise 1/22  AFP- neg  Anemia  Repeat C/S 7/6/22

## 2022-06-09 RX ORDER — LOPERAMIDE HYDROCHLORIDE 2 MG/1
CAPSULE ORAL
Qty: 20 CAPSULE | Refills: 1 | OUTPATIENT
Start: 2022-06-09

## 2022-06-09 NOTE — TELEPHONE ENCOUNTER
33year old  35w1d    ?ok to refill requested prescription last sent on 2020    Please amend/sign/refuse    Thank you

## 2022-06-12 LAB — B-HEM STREP SPEC QL CULT: NEGATIVE

## 2022-06-24 ENCOUNTER — ROUTINE PRENATAL (OUTPATIENT)
Dept: OBGYN CLINIC | Age: 32
End: 2022-06-24
Payer: COMMERCIAL

## 2022-06-24 VITALS
BODY MASS INDEX: 37.81 KG/M2 | HEART RATE: 93 BPM | WEIGHT: 193.6 LBS | SYSTOLIC BLOOD PRESSURE: 116 MMHG | DIASTOLIC BLOOD PRESSURE: 74 MMHG

## 2022-06-24 DIAGNOSIS — Z34.80 SUPERVISION OF OTHER NORMAL PREGNANCY, ANTEPARTUM: Primary | ICD-10-CM

## 2022-06-24 PROCEDURE — 0502F SUBSEQUENT PRENATAL CARE: CPT | Performed by: OBSTETRICS & GYNECOLOGY

## 2022-06-24 RX ORDER — VALACYCLOVIR HYDROCHLORIDE 500 MG/1
500 TABLET, FILM COATED ORAL 2 TIMES DAILY
Qty: 60 TABLET | Refills: 1 | Status: ON HOLD | OUTPATIENT
Start: 2022-06-24 | End: 2022-07-09

## 2022-06-24 NOTE — PROGRESS NOTES
Intrauterine pregnancy with the following problems identified:   [facility] 7/14/2022 D=US  Covid vaccine booster due Jan 11  Flu vaccine 11/30/2021  NIPTS- normal female  Horizon- neg  Hx of CS x 2 - repeat at term  HSV II - suppression at 36 weeks  Delayed PPH PPD 5 - transfusion  Cruise 1/22  AFP- neg  Anemia  GBS Negative  Repeat C/S 7/6/22

## 2022-07-01 ENCOUNTER — ROUTINE PRENATAL (OUTPATIENT)
Dept: OBGYN CLINIC | Age: 32
End: 2022-07-01
Payer: COMMERCIAL

## 2022-07-01 VITALS — DIASTOLIC BLOOD PRESSURE: 52 MMHG | SYSTOLIC BLOOD PRESSURE: 91 MMHG | BODY MASS INDEX: 37.77 KG/M2 | WEIGHT: 193.4 LBS

## 2022-07-01 DIAGNOSIS — Z34.80 SUPERVISION OF OTHER NORMAL PREGNANCY, ANTEPARTUM: Primary | ICD-10-CM

## 2022-07-01 PROCEDURE — 0502F SUBSEQUENT PRENATAL CARE: CPT | Performed by: OBSTETRICS & GYNECOLOGY

## 2022-07-01 RX ORDER — MESALAMINE 0.38 G/1
CAPSULE, EXTENDED RELEASE ORAL
Status: ON HOLD | COMMUNITY
Start: 2022-06-28 | End: 2022-07-09

## 2022-07-01 RX ORDER — PRENATAL VIT 96/IRON FUM/FOLIC 27MG-0.8MG
1 TABLET ORAL DAILY
COMMUNITY
Start: 2022-06-12 | End: 2022-10-17

## 2022-07-01 NOTE — PROGRESS NOTES
NIPTS- normal female  Horizon- neg  Hx of CS x 2 - repeat at term  HSV II - suppression at 36 weeks  Delayed PPH PPD 5 - transfusion  Cruise 1/22  AFP- neg  Anemia  GBS Negative  Repeat C/S 7/6/22

## 2022-07-06 ENCOUNTER — HOSPITAL ENCOUNTER (INPATIENT)
Age: 32
LOS: 3 days | Discharge: HOME | End: 2022-07-09
Attending: OBSTETRICS & GYNECOLOGY | Admitting: OBSTETRICS & GYNECOLOGY
Payer: COMMERCIAL

## 2022-07-06 ENCOUNTER — ANESTHESIA (OUTPATIENT)
Dept: LABOR AND DELIVERY | Age: 32
End: 2022-07-06
Payer: COMMERCIAL

## 2022-07-06 ENCOUNTER — ANESTHESIA EVENT (OUTPATIENT)
Dept: LABOR AND DELIVERY | Age: 32
End: 2022-07-06
Payer: COMMERCIAL

## 2022-07-06 DIAGNOSIS — G89.18 POSTOPERATIVE PAIN: Primary | ICD-10-CM

## 2022-07-06 PROBLEM — Z34.90 PREGNANCY: Status: ACTIVE | Noted: 2022-07-06

## 2022-07-06 LAB
ABO + RH BLD: NORMAL
BASOPHILS # BLD: 0.1 K/UL (ref 0–0.1)
BASOPHILS NFR BLD: 1 % (ref 0–1)
BLOOD GROUP ANTIBODIES SERPL: NORMAL
COVID-19 RAPID TEST, COVR: NOT DETECTED
DIFFERENTIAL METHOD BLD: ABNORMAL
EOSINOPHIL # BLD: 0.1 K/UL (ref 0–0.4)
EOSINOPHIL NFR BLD: 1 % (ref 0–7)
ERYTHROCYTE [DISTWIDTH] IN BLOOD BY AUTOMATED COUNT: 16.6 % (ref 11.5–14.5)
HCT VFR BLD AUTO: 34.3 % (ref 35–47)
HGB BLD-MCNC: 11.2 G/DL (ref 11.5–16)
IMM GRANULOCYTES # BLD AUTO: 0.1 K/UL (ref 0–0.04)
IMM GRANULOCYTES NFR BLD AUTO: 1 % (ref 0–0.5)
LYMPHOCYTES # BLD: 1.9 K/UL (ref 0.8–3.5)
LYMPHOCYTES NFR BLD: 21 % (ref 12–49)
MCH RBC QN AUTO: 25.4 PG (ref 26–34)
MCHC RBC AUTO-ENTMCNC: 32.7 G/DL (ref 30–36.5)
MCV RBC AUTO: 77.8 FL (ref 80–99)
MONOCYTES # BLD: 0.5 K/UL (ref 0–1)
MONOCYTES NFR BLD: 6 % (ref 5–13)
NEUTS SEG # BLD: 6.4 K/UL (ref 1.8–8)
NEUTS SEG NFR BLD: 70 % (ref 32–75)
NRBC # BLD: 0 K/UL (ref 0–0.01)
NRBC BLD-RTO: 0 PER 100 WBC
PLATELET # BLD AUTO: 237 K/UL (ref 150–400)
PMV BLD AUTO: 9.5 FL (ref 8.9–12.9)
RBC # BLD AUTO: 4.41 M/UL (ref 3.8–5.2)
SOURCE, COVRS: NORMAL
SPECIMEN EXP DATE BLD: NORMAL
WBC # BLD AUTO: 8.9 K/UL (ref 3.6–11)

## 2022-07-06 PROCEDURE — 74011000250 HC RX REV CODE- 250

## 2022-07-06 PROCEDURE — 75410000003 HC RECOV DEL/VAG/CSECN EA 0.5 HR: Performed by: OBSTETRICS & GYNECOLOGY

## 2022-07-06 PROCEDURE — 74011250636 HC RX REV CODE- 250/636: Performed by: ANESTHESIOLOGY

## 2022-07-06 PROCEDURE — 76010000392 HC C SECN EA ADDL 0.5 HR: Performed by: OBSTETRICS & GYNECOLOGY

## 2022-07-06 PROCEDURE — 74011000258 HC RX REV CODE- 258: Performed by: OBSTETRICS & GYNECOLOGY

## 2022-07-06 PROCEDURE — 65270000029 HC RM PRIVATE

## 2022-07-06 PROCEDURE — 77030018809 HC RETRCTR ALXSO DISP AMR -B

## 2022-07-06 PROCEDURE — 36415 COLL VENOUS BLD VENIPUNCTURE: CPT

## 2022-07-06 PROCEDURE — 10907ZC DRAINAGE OF AMNIOTIC FLUID, THERAPEUTIC FROM PRODUCTS OF CONCEPTION, VIA NATURAL OR ARTIFICIAL OPENING: ICD-10-PCS | Performed by: OBSTETRICS & GYNECOLOGY

## 2022-07-06 PROCEDURE — 86850 RBC ANTIBODY SCREEN: CPT

## 2022-07-06 PROCEDURE — 77030040361 HC SLV COMPR DVT MDII -B

## 2022-07-06 PROCEDURE — 74011250636 HC RX REV CODE- 250/636: Performed by: NURSE ANESTHETIST, CERTIFIED REGISTERED

## 2022-07-06 PROCEDURE — 87635 SARS-COV-2 COVID-19 AMP PRB: CPT

## 2022-07-06 PROCEDURE — 74011250636 HC RX REV CODE- 250/636: Performed by: OBSTETRICS & GYNECOLOGY

## 2022-07-06 PROCEDURE — 76010000391 HC C SECN FIRST 1 HR: Performed by: OBSTETRICS & GYNECOLOGY

## 2022-07-06 PROCEDURE — 85025 COMPLETE CBC W/AUTO DIFF WBC: CPT

## 2022-07-06 PROCEDURE — 77030005513 HC CATH URETH FOL11 MDII -B

## 2022-07-06 PROCEDURE — 2709999900 HC NON-CHARGEABLE SUPPLY

## 2022-07-06 PROCEDURE — 76060000078 HC EPIDURAL ANESTHESIA: Performed by: OBSTETRICS & GYNECOLOGY

## 2022-07-06 PROCEDURE — 74011250636 HC RX REV CODE- 250/636

## 2022-07-06 PROCEDURE — 59510 CESAREAN DELIVERY: CPT | Performed by: OBSTETRICS & GYNECOLOGY

## 2022-07-06 PROCEDURE — 77030008459 HC STPLR SKN COOP -B

## 2022-07-06 RX ORDER — HYDROMORPHONE HYDROCHLORIDE 1 MG/ML
0.5 INJECTION, SOLUTION INTRAMUSCULAR; INTRAVENOUS; SUBCUTANEOUS
Status: DISCONTINUED | OUTPATIENT
Start: 2022-07-06 | End: 2022-07-06 | Stop reason: SDUPTHER

## 2022-07-06 RX ORDER — OXYCODONE HYDROCHLORIDE 5 MG/1
5 TABLET ORAL
Status: DISPENSED | OUTPATIENT
Start: 2022-07-06 | End: 2022-07-07

## 2022-07-06 RX ORDER — CLINDAMYCIN PHOSPHATE 900 MG/50ML
900 INJECTION INTRAVENOUS ONCE
Status: COMPLETED | OUTPATIENT
Start: 2022-07-06 | End: 2022-07-06

## 2022-07-06 RX ORDER — OXYTOCIN/RINGER'S LACTATE 30/500 ML
10 PLASTIC BAG, INJECTION (ML) INTRAVENOUS AS NEEDED
Status: DISCONTINUED | OUTPATIENT
Start: 2022-07-06 | End: 2022-07-07 | Stop reason: SDUPTHER

## 2022-07-06 RX ORDER — BUPIVACAINE HYDROCHLORIDE 7.5 MG/ML
INJECTION, SOLUTION EPIDURAL; RETROBULBAR AS NEEDED
Status: DISCONTINUED | OUTPATIENT
Start: 2022-07-06 | End: 2022-07-06 | Stop reason: HOSPADM

## 2022-07-06 RX ORDER — FENTANYL CITRATE 50 UG/ML
INJECTION, SOLUTION INTRAMUSCULAR; INTRAVENOUS AS NEEDED
Status: DISCONTINUED | OUTPATIENT
Start: 2022-07-06 | End: 2022-07-06 | Stop reason: HOSPADM

## 2022-07-06 RX ORDER — MORPHINE SULFATE 0.5 MG/ML
INJECTION, SOLUTION EPIDURAL; INTRATHECAL; INTRAVENOUS AS NEEDED
Status: DISCONTINUED | OUTPATIENT
Start: 2022-07-06 | End: 2022-07-06 | Stop reason: HOSPADM

## 2022-07-06 RX ORDER — DIPHENHYDRAMINE HYDROCHLORIDE 50 MG/ML
12.5 INJECTION, SOLUTION INTRAMUSCULAR; INTRAVENOUS
Status: DISPENSED | OUTPATIENT
Start: 2022-07-06 | End: 2022-07-07

## 2022-07-06 RX ORDER — TRANEXAMIC ACID 100 MG/ML
INJECTION, SOLUTION INTRAVENOUS AS NEEDED
Status: DISCONTINUED | OUTPATIENT
Start: 2022-07-06 | End: 2022-07-06 | Stop reason: HOSPADM

## 2022-07-06 RX ORDER — OXYCODONE HYDROCHLORIDE 5 MG/1
10 TABLET ORAL
Status: ACTIVE | OUTPATIENT
Start: 2022-07-06 | End: 2022-07-07

## 2022-07-06 RX ORDER — EPHEDRINE SULFATE/0.9% NACL/PF 50 MG/5 ML
SYRINGE (ML) INTRAVENOUS AS NEEDED
Status: DISCONTINUED | OUTPATIENT
Start: 2022-07-06 | End: 2022-07-06 | Stop reason: HOSPADM

## 2022-07-06 RX ORDER — SODIUM CHLORIDE, SODIUM LACTATE, POTASSIUM CHLORIDE, CALCIUM CHLORIDE 600; 310; 30; 20 MG/100ML; MG/100ML; MG/100ML; MG/100ML
INJECTION, SOLUTION INTRAVENOUS
Status: DISCONTINUED | OUTPATIENT
Start: 2022-07-06 | End: 2022-07-06 | Stop reason: HOSPADM

## 2022-07-06 RX ORDER — FAMOTIDINE 10 MG/ML
INJECTION INTRAVENOUS AS NEEDED
Status: DISCONTINUED | OUTPATIENT
Start: 2022-07-06 | End: 2022-07-06 | Stop reason: HOSPADM

## 2022-07-06 RX ORDER — HYDROMORPHONE HYDROCHLORIDE 1 MG/ML
0.5 INJECTION, SOLUTION INTRAMUSCULAR; INTRAVENOUS; SUBCUTANEOUS
Status: DISCONTINUED | OUTPATIENT
Start: 2022-07-06 | End: 2022-07-06

## 2022-07-06 RX ORDER — SODIUM CHLORIDE 0.9 % (FLUSH) 0.9 %
5-40 SYRINGE (ML) INJECTION AS NEEDED
Status: DISCONTINUED | OUTPATIENT
Start: 2022-07-06 | End: 2022-07-06 | Stop reason: HOSPADM

## 2022-07-06 RX ORDER — PHENYLEPHRINE HCL IN 0.9% NACL 0.4MG/10ML
SYRINGE (ML) INTRAVENOUS AS NEEDED
Status: DISCONTINUED | OUTPATIENT
Start: 2022-07-06 | End: 2022-07-06 | Stop reason: HOSPADM

## 2022-07-06 RX ORDER — NALBUPHINE HYDROCHLORIDE 10 MG/ML
10 INJECTION, SOLUTION INTRAMUSCULAR; INTRAVENOUS; SUBCUTANEOUS ONCE
Status: COMPLETED | OUTPATIENT
Start: 2022-07-06 | End: 2022-07-06

## 2022-07-06 RX ORDER — KETOROLAC TROMETHAMINE 30 MG/ML
30 INJECTION, SOLUTION INTRAMUSCULAR; INTRAVENOUS
Status: DISPENSED | OUTPATIENT
Start: 2022-07-06 | End: 2022-07-07

## 2022-07-06 RX ORDER — KETOROLAC TROMETHAMINE 15 MG/ML
INJECTION, SOLUTION INTRAMUSCULAR; INTRAVENOUS AS NEEDED
Status: DISCONTINUED | OUTPATIENT
Start: 2022-07-06 | End: 2022-07-06 | Stop reason: HOSPADM

## 2022-07-06 RX ORDER — HYDROMORPHONE HYDROCHLORIDE 1 MG/ML
0.5 INJECTION, SOLUTION INTRAMUSCULAR; INTRAVENOUS; SUBCUTANEOUS
Status: DISPENSED | OUTPATIENT
Start: 2022-07-06 | End: 2022-07-06

## 2022-07-06 RX ORDER — ONDANSETRON 2 MG/ML
INJECTION INTRAMUSCULAR; INTRAVENOUS AS NEEDED
Status: DISCONTINUED | OUTPATIENT
Start: 2022-07-06 | End: 2022-07-06 | Stop reason: HOSPADM

## 2022-07-06 RX ORDER — DIPHENHYDRAMINE HCL 25 MG
25 CAPSULE ORAL
COMMUNITY

## 2022-07-06 RX ORDER — OXYTOCIN 10 [USP'U]/ML
INJECTION, SOLUTION INTRAMUSCULAR; INTRAVENOUS AS NEEDED
Status: DISCONTINUED | OUTPATIENT
Start: 2022-07-06 | End: 2022-07-06 | Stop reason: HOSPADM

## 2022-07-06 RX ORDER — SODIUM CHLORIDE, SODIUM LACTATE, POTASSIUM CHLORIDE, CALCIUM CHLORIDE 600; 310; 30; 20 MG/100ML; MG/100ML; MG/100ML; MG/100ML
125 INJECTION, SOLUTION INTRAVENOUS CONTINUOUS
Status: DISCONTINUED | OUTPATIENT
Start: 2022-07-06 | End: 2022-07-07 | Stop reason: SDUPTHER

## 2022-07-06 RX ORDER — OXYTOCIN/RINGER'S LACTATE 30/500 ML
87.3 PLASTIC BAG, INJECTION (ML) INTRAVENOUS AS NEEDED
Status: DISCONTINUED | OUTPATIENT
Start: 2022-07-06 | End: 2022-07-07 | Stop reason: SDUPTHER

## 2022-07-06 RX ORDER — SODIUM CHLORIDE 0.9 % (FLUSH) 0.9 %
5-40 SYRINGE (ML) INJECTION EVERY 8 HOURS
Status: DISCONTINUED | OUTPATIENT
Start: 2022-07-06 | End: 2022-07-06 | Stop reason: HOSPADM

## 2022-07-06 RX ORDER — SODIUM CHLORIDE, SODIUM LACTATE, POTASSIUM CHLORIDE, CALCIUM CHLORIDE 600; 310; 30; 20 MG/100ML; MG/100ML; MG/100ML; MG/100ML
1000 INJECTION, SOLUTION INTRAVENOUS CONTINUOUS
Status: DISCONTINUED | OUTPATIENT
Start: 2022-07-06 | End: 2022-07-06 | Stop reason: HOSPADM

## 2022-07-06 RX ORDER — ONDANSETRON 2 MG/ML
4 INJECTION INTRAMUSCULAR; INTRAVENOUS
Status: DISCONTINUED | OUTPATIENT
Start: 2022-07-06 | End: 2022-07-08

## 2022-07-06 RX ADMIN — KETOROLAC TROMETHAMINE 30 MG: 30 INJECTION, SOLUTION INTRAMUSCULAR at 17:12

## 2022-07-06 RX ADMIN — SODIUM CHLORIDE, POTASSIUM CHLORIDE, SODIUM LACTATE AND CALCIUM CHLORIDE 1000 ML: 600; 310; 30; 20 INJECTION, SOLUTION INTRAVENOUS at 07:37

## 2022-07-06 RX ADMIN — ONDANSETRON 4 MG: 2 INJECTION INTRAMUSCULAR; INTRAVENOUS at 09:41

## 2022-07-06 RX ADMIN — SODIUM CHLORIDE, POTASSIUM CHLORIDE, SODIUM LACTATE AND CALCIUM CHLORIDE 125 ML/HR: 600; 310; 30; 20 INJECTION, SOLUTION INTRAVENOUS at 18:23

## 2022-07-06 RX ADMIN — HYDROMORPHONE HYDROCHLORIDE 0.5 MG: 1 INJECTION, SOLUTION INTRAMUSCULAR; INTRAVENOUS; SUBCUTANEOUS at 14:46

## 2022-07-06 RX ADMIN — Medication 10 MG: at 09:51

## 2022-07-06 RX ADMIN — Medication 80 MCG: at 09:58

## 2022-07-06 RX ADMIN — KETOROLAC TROMETHAMINE 30 MG: 15 INJECTION, SOLUTION INTRAMUSCULAR; INTRAVENOUS at 10:35

## 2022-07-06 RX ADMIN — BUPIVACAINE HYDROCHLORIDE 1.5 ML: 7.5 INJECTION, SOLUTION EPIDURAL; RETROBULBAR at 09:47

## 2022-07-06 RX ADMIN — CLINDAMYCIN PHOSPHATE 900 MG: 900 INJECTION, SOLUTION INTRAVENOUS at 09:07

## 2022-07-06 RX ADMIN — TRANEXAMIC ACID 1 G: 100 INJECTION, SOLUTION INTRAVENOUS at 10:24

## 2022-07-06 RX ADMIN — MORPHINE SULFATE 150 MCG: 0.5 INJECTION, SOLUTION EPIDURAL; INTRATHECAL; INTRAVENOUS at 09:47

## 2022-07-06 RX ADMIN — OXYTOCIN 20 UNITS: 10 INJECTION, SOLUTION INTRAMUSCULAR; INTRAVENOUS at 10:20

## 2022-07-06 RX ADMIN — FENTANYL CITRATE 15 MCG: 50 INJECTION, SOLUTION INTRAMUSCULAR; INTRAVENOUS at 09:47

## 2022-07-06 RX ADMIN — SODIUM CHLORIDE, SODIUM LACTATE, POTASSIUM CHLORIDE, CALCIUM CHLORIDE: 600; 310; 30; 20 INJECTION, SOLUTION INTRAVENOUS at 09:50

## 2022-07-06 RX ADMIN — DIPHENHYDRAMINE HYDROCHLORIDE 12.5 MG: 50 INJECTION, SOLUTION INTRAMUSCULAR; INTRAVENOUS at 11:31

## 2022-07-06 RX ADMIN — NALBUPHINE HYDROCHLORIDE 10 MG: 10 INJECTION, SOLUTION INTRAMUSCULAR; INTRAVENOUS; SUBCUTANEOUS at 12:43

## 2022-07-06 RX ADMIN — DIPHENHYDRAMINE HYDROCHLORIDE 12.5 MG: 50 INJECTION, SOLUTION INTRAMUSCULAR; INTRAVENOUS at 20:25

## 2022-07-06 RX ADMIN — ONDANSETRON 4 MG: 2 INJECTION INTRAMUSCULAR; INTRAVENOUS at 14:30

## 2022-07-06 RX ADMIN — Medication 80 MCG: at 10:06

## 2022-07-06 RX ADMIN — FAMOTIDINE 20 MG: 10 INJECTION INTRAVENOUS at 10:25

## 2022-07-06 RX ADMIN — GENTAMICIN SULFATE 124.8 MG: 40 INJECTION, SOLUTION INTRAMUSCULAR; INTRAVENOUS at 09:50

## 2022-07-06 RX ADMIN — HYDROMORPHONE HYDROCHLORIDE 0.5 MG: 1 INJECTION, SOLUTION INTRAMUSCULAR; INTRAVENOUS; SUBCUTANEOUS at 13:00

## 2022-07-06 RX ADMIN — ONDANSETRON 4 MG: 2 INJECTION INTRAMUSCULAR; INTRAVENOUS at 20:25

## 2022-07-06 NOTE — PROGRESS NOTES
0700: Bedside and Verbal shift change report given to Natty Maria RN (oncoming nurse) by Cristina Graves RN (offgoing nurse). Report included the following information SBAR, Intake/Output, MAR, Recent Results and Med Rec Status. 0930:Dr. Tarik Godfrey at bedside. All questions addressed. 1253:  Total

## 2022-07-06 NOTE — ROUTINE PROCESS
Bedside and verbal shift change report given to oncoming nurse, as assigned, by offgoing nurse, Brody Marroquin RN. Report included SBAR, Kardex, I&Os, Recent Results, Procedures, MAR, and changes in patient status. Oncoming nurse and patient given opportunity for questions.

## 2022-07-06 NOTE — H&P
History & Physical    Name: Rebecca Limon MRN: [id number]  SSN: [id number]    YOB: 1990  Age: 27 y.o. Sex: female        Subjective:     Estimated Date of Delivery: 22  OB History        4    Para   3    Term   3       0    AB   1    Living   3       SAB   1    IAB   0    Ectopic   0    Molar   0    Multiple   0    Live Births   3                Ms. Simon Burnette is admitted with pregnancy at 39w0d for  Section. Prenatal course was normal. Please see prenatal records for details. Problem List  Date Reviewed: 2022          Codes Class Noted    Pregnancy ICD-10-CM: Z34.90  ICD-9-CM: V22.2  2022        Supervision of normal pregnancy ICD-10-CM: Z34.90  ICD-9-CM: V22.1  2021    Overview Addendum 2022 10:14 AM by Henry HAMMONDS     Intrauterine pregnancy with the following problems identified:   [address] 2022 D=US  Covid vaccine booster due   Flu vaccine 2021  NIPTS- normal female  Horizon- neg  Hx of CS x 2 - repeat at term  HSV II - suppression at 36 weeks  Delayed PPH PPD 5 - transfusion  Cruise   AFP- neg  Anemia  GBS Negative  Repeat C/S 22             Fatty liver ICD-10-CM: K76.0  ICD-9-CM: 571.8  2020    Overview Signed 2020 11:48 AM by Jose Daniel Boykin PA-C     Ultrasound 2020             Adult situational stress disorder ICD-10-CM: F43.20  ICD-9-CM: 308.9  2017        Glucose intolerance (impaired glucose tolerance) ICD-10-CM: R73.02  ICD-9-CM: 790.22  Unknown        IBD (inflammatory bowel disease) ICD-10-CM: K52.9  ICD-9-CM: 558. 9  Unknown        Anxiety ICD-10-CM: F41.9  ICD-9-CM: 300.00  Unknown        ADHD (attention deficit hyperactivity disorder) ICD-10-CM: F90.9  ICD-9-CM: 314.01  2012        Insomnia ICD-10-CM: G47.00  ICD-9-CM: 780.52  4/3/2010        Depression ICD-10-CM: F32. A  ICD-9-CM: 209  4/3/2010                Past Medical History:   Diagnosis Date    ADHD (attention deficit hyperactivity disorder)     Anemia     Anxiety     Bipolar 1 disorder ([facility] [address]     Depression 4/3/2010    Genital herpes     Glucose intolerance (impaired glucose tolerance)     Herpes simplex virus (HSV) infection     IBD (inflammatory bowel disease)     Insomnia 4/3/2010    Liver disease      Past Surgical History:   Procedure Laterality Date    HX  SECTION      WI BREAST SURGERY PROCEDURE UNLISTED           Social History     Occupational History    Occupation: [email or web address]   Tobacco Use    Smoking status: Former Smoker     Packs/day: 0.30     Years: 3.00     Pack years: 0.90     Types: Cigarettes    Smokeless tobacco: Never Used   Substance and Sexual Activity    Alcohol use: No     Alcohol/week: 0.0 standard drinks    Drug use: No    Sexual activity: Yes     Partners: Male     Family History   Adopted: Yes       Allergies   Allergen Reactions    Cephalexin Rash    Pcn [Penicillins] Rash     Prior to Admission medications    Medication Sig Start Date End Date Taking? Authorizing Provider   diphenhydrAMINE (BenadryL) 25 mg capsule Take 25 mg by mouth every six (6) hours as needed for Sleep. Yes Provider, Historical   prenatal HLKE78/IXOY fum/folic (prenatal vitamin) 27 mg iron- 800 mcg tab tablet Take 1 Tablet by mouth daily. 22  Yes Provider, Historical   ferrous sulfate 325 mg (65 mg iron) tablet Take 1 Tablet by mouth Daily (before breakfast). 22  Yes Lavonia Dakin, MD   ondansetron hcl (ZOFRAN) 4 mg tablet TAKE 1 TABLET BY MOUTH EVERY 8 HOURS AS NEEDED FOR NAUSEA OR VOMITING 21  Yes Qian Baumann PA-C   valACYclovir (VALTREX) 500 mg tablet Take  by mouth two (2) times a day. Yes Provider, Historical   mesalamine ER (APRISO) 0.375 gram 24 hour capsule TAKE 4 CAPSULES BY MOUTH DAILY  Patient not taking: Reported on 2022   Provider, Historical   valACYclovir (VALTREX) 500 mg tablet Take 1 Tablet by mouth two (2) times a day.   Patient not taking: Reported on 2022 6/24/22   Jason Nathan MD   hydrocortisone-pramoxine ([facility] rectal foam Insert 1 Applicator into rectum three (3) times daily as needed for Hemorrhoids. Patient not taking: Reported on 4/18/2022 12/20/21   Jason Nathan MD   ondansetron [facility] ODT) 8 mg disintegrating tablet Take 1 Tablet by mouth every eight (8) hours as needed for Nausea or Vomiting. Patient not taking: Reported on 4/18/2022 12/6/21   Jason Nathan MD   miSOPROStoL (CYTOTEC) 200 mcg tablet 5 pills in vagina at bedtime, repeat in 12 hours and lie down for 2 hours if no result  Patient not taking: Reported on 7/6/2022 8/5/21   Jason Nathan MD   [facility]etron [facility]) 8 mg disintegrating tablet Take 1 Tablet by mouth every eight (8) hours as needed for Nausea or Vomiting. Patient not taking: Reported on 4/18/2022 8/5/21   Jason Nathan MD   loperamide (IMODIUM) 2 mg capsule TAKE 1 CAPSULE BY MOUTH FOUR TIMES DAILY AS NEEDED FOR DIARRHEA  Patient not taking: Reported on 7/6/2022 12/8/20   Celena Franco PA-C   buPROPion XL (WELLBUTRIN XL) 150 mg tablet Take 1 Tab by mouth every morning. Patient not taking: Reported on 7/6/2022 12/8/20   Celena Franco PA-C   NORETHINDRONE ACETATE PO norethindrone (contraceptive) 0.35 mg tablet  Patient not taking: Reported on 4/18/2022    Provider, Historical   mometasone (ELOCON) 0.1 % ointment Apply  to affected area daily. Patient not taking: Reported on 4/18/2022 3/29/18   Lulu Cuevas PA-C        Review of Systems: A comprehensive review of systems was negative except for that written in the HPI. Objective:     Vitals:  Vitals:    07/06/22 1209 07/06/22 1210 07/06/22 1215 07/06/22 1440   BP:  112/60  118/69   Pulse:  74  82   Resp:    18   Temp:    98.4 °F (36.9 °C)   SpO2: (!) 88% 100% 96% 99%   Weight:       Height:            Physical Exam:  Patient without distress.   Heart: Regular rate and rhythm  Lung: clear to auscultation throughout lung fields, no wheezes, no rales, no rhonchi and normal respiratory effort  Abdomen: soft, nontender  Fundus: soft and non tender  Perineum: blood absent, amniotic fluid absent  Membranes:  Intact  Fetal Heart Rate: Reactive    Prenatal Labs:   Lab Results   Component Value Date/Time    Rubella, External REACTIVE 11/30/2021 12:00 AM    GrBStrep, External NEGATIVE 06/08/2022 12:00 AM    HBsAg, External NEGATIVE 11/30/2021 12:00 AM    HIV, External NON REACTIVE  11/30/2021 12:00 AM    Chlamydia, External NEGATIVE  11/30/2021 12:00 AM        Assessment/Plan:     Plan: Admit for elective repeat CS x 3.   Group B Strep was negative    Signed By:  Mi Akers MD     July 6, 2022

## 2022-07-06 NOTE — PROGRESS NOTES
Performed fundal check on patient to change her pad. Upon fundal exam, she was firm with massage, U+1 with several moderate gushes of rubra. No clots noted. Mendiola draining well. Charge nurse informed and performed fundal at bedside. Will recheck in 1 hour. 1820- Pt now firm @U, scant rubra.

## 2022-07-06 NOTE — PROGRESS NOTES
0800: Dr. Kevyn Murrieta 900mg clinda and gentamycin 2mg/kg for surgical prophylaxis due to patient allergies

## 2022-07-06 NOTE — PROGRESS NOTES
0630 pt arrives ambulatory for scheduled C/S @0930. COVID swab sent per order due to patient being exposed to covid 2 weeks ago.  Pt is asymptomatic

## 2022-07-06 NOTE — ANESTHESIA PROCEDURE NOTES
Spinal Block    Start time: 7/6/2022 9:45 AM  End time: 7/6/2022 9:48 AM  Performed by: Sarah Colorado CRNA  Authorized by: Sarah Colorado CRNA     Pre-procedure:   Indications: at surgeon's request, post-op pain management and primary anesthetic  Preanesthetic Checklist: patient identified, risks and benefits discussed, anesthesia consent, site marked, patient being monitored, timeout performed and fire risk safety assessment completed and verbalized    Timeout Time: 09:45 EDT          Spinal Block:   Patient Position:  Seated  Prep Region:  Lumbar  Prep: DuraPrep      Location:  L3-4  Technique:  Single shot      Med Admin Time: 7/6/2022 9:47 AM    Needle:   Needle Type:  Pencan  Needle Gauge:  25 G  Attempts:  1      Events: CSF confirmed        Assessment:  Insertion:  Uncomplicated  Patient tolerance:  Patient tolerated the procedure well with no immediate complications

## 2022-07-06 NOTE — ANESTHESIA PREPROCEDURE EVALUATION
Anesthetic History          Comments: Nausea during last c section     Review of Systems / Medical History  Patient summary reviewed and pertinent labs reviewed    Pulmonary  Within defined limits                 Neuro/Psych         Psychiatric history     Cardiovascular  Within defined limits                     GI/Hepatic/Renal  Within defined limits              Endo/Other  Within defined limits           Other Findings              Physical Exam    Airway  Mallampati: II    Neck ROM: normal range of motion   Mouth opening: Normal     Cardiovascular    Rhythm: regular  Rate: normal         Dental    Dentition: Lower dentition intact and Upper dentition intact     Pulmonary  Breath sounds clear to auscultation               Abdominal  GI exam deferred       Other Findings            Anesthetic Plan    ASA: 2  Anesthesia type: spinal          Induction: Intravenous  Anesthetic plan and risks discussed with: Patient

## 2022-07-06 NOTE — OP NOTES
Operative Note    Name: Natalia Magallanes   Medical Record Number: [id number]   YOB: 1990  Today's Date: 2022      Pre-operative Diagnosis: Previous  delivery affecting pregnancy [O34.219]    Post-operative Diagnosis: Previous  delivery affecting pregnancy [O34.219]    Operation: low transverse  section Procedure(s):   SECTION    Surgeon(s):  Esthela Castro MD    Anesthesia: Spinal    EBL: 750cc  Prophylactic Antibiotics: Ancef  DVT Prophylaxis: Sequential Compression Devices         Fetal Description: molina     Birth Information:   Information for the patient's :  Chelita Carry Female Echo Santos [[id number]]   Delivery of a 7 lb 14.1 oz (3.575 kg) female infant on 2022 at 10:19 AM. Apgars were   and  . Umbilical Cord: 3 Vessels     Umbilical Cord Events: Nuchal Cord Without Compressions     Placenta: Expressed removal with Normal appearance. Amniotic Fluid Volume: Moderate     Amniotic Fluid Description:  Clear        Umbilical Cord: Nuchal Cord x  2    Placenta:  expressed    Specimens: none           Complications:  none    Implants: none    Circ-1: Chencho Perdomo O  Scrub [facility]  Surg Asst-1: Beatrice Poster        Procedure Detail:      After proper patient identification and consent, the patient was taken to the operating room, where spinal anesthesia was administered and found to be adequate. Mendiola catheter had been placed using sterile technique. The patient was prepped and draped in the normal sterile fashion. The abdomen was entered using the Pfannenstiel technique. The peritoneum was entered bluntely well superior to the bladder without any apparent injury. An Santosh retractor was placed. Palpation revealed no bowel below the retractor. The bladder flap was created without difficulty. A low transverse uterine incision was made with the scalpel and extended with blunt finger dissection.  Amniotomy was performed and the fluid was medium amount clear. The babys head was then delivered atraumatically. The nose and mouth were suctioned. The cord was clamped and cut and the baby was handed off to Nursing staff in attendance. The placenta was expressed. The uterus was wiped clean with a moist lap pad and cleared of all clots and debris. The uterine incision was closed in 2 layers, first with a running locked suture of 0-Monocryl, then with an imbricated layer. Adequate hemostasis was noted. Both tubes and ovaries appeared normal. The pericolic gutters were then lavaged clean with normal saline. Good hemostasis was again reassured The Santosh retractor was removed. The fascia was closed with stratifix symmetric in a running fashion. Good hemostasis was assured. The incision was lavaged clean and small bleeders were coagulated with the bovie. The skin was closed with absorbable staples. The patient tolerated the procedure well. Sponge, lap, and needle counts were correct times three and the patient and baby were taken to recovery/postpartum room in stable condition.     Cristian Blackwell MD  July 6, 2022  6:02 PM

## 2022-07-06 NOTE — PROGRESS NOTES
7/6/2022  12:01 PM    CM met with LANCE to complete initial assessment and begin discharge planning. MOB verified and confirmed demographics. LANCE lives with MARCELO Alcazar ( [phone]), along with their 3 and 4yr old, at the address on file. LANCE is employed part time and plans to take adequate time off from work. FOLUAN is also employed and will be taking adequate time off. LANCE reports she has good family support, and feels like she has the support she needs when she returns home. LANCE plans to bottle feed baby. Elle Midway will provide follow up care for infant. LANCE has car seat, bassinet/crib, clothing, bottles and all necessary supplies for baby. LANCE has [employer], and will be adding baby to this policy. CM discussed process to add baby to insurance, MOB verbalized understanding. LANCE denied needing WIC/Medicaid services. Care Management Interventions  PCP Verified by CM: Yes Valeriy Mckeon)  Mode of Transport at Discharge:  Other (see comment)  Transition of Care Consult (CM Consult): Discharge Planning  Support Systems: Other Family Member(s),Spouse/Significant Other  Confirm Follow Up Transport: Family  Discharge Location  Patient Expects to be Discharged to[de-identified] Home with family assistance  Marifer Bennett

## 2022-07-07 PROCEDURE — 74011250637 HC RX REV CODE- 250/637: Performed by: OBSTETRICS & GYNECOLOGY

## 2022-07-07 PROCEDURE — 65270000029 HC RM PRIVATE

## 2022-07-07 PROCEDURE — 74011250636 HC RX REV CODE- 250/636: Performed by: OBSTETRICS & GYNECOLOGY

## 2022-07-07 PROCEDURE — 74011250636 HC RX REV CODE- 250/636: Performed by: NURSE ANESTHETIST, CERTIFIED REGISTERED

## 2022-07-07 PROCEDURE — 74011250637 HC RX REV CODE- 250/637: Performed by: ANESTHESIOLOGY

## 2022-07-07 PROCEDURE — 74011250636 HC RX REV CODE- 250/636: Performed by: ANESTHESIOLOGY

## 2022-07-07 RX ORDER — ZOLPIDEM TARTRATE 5 MG/1
5 TABLET ORAL
Status: DISCONTINUED | OUTPATIENT
Start: 2022-07-07 | End: 2022-07-09 | Stop reason: HOSPADM

## 2022-07-07 RX ORDER — OXYTOCIN/RINGER'S LACTATE 30/500 ML
10 PLASTIC BAG, INJECTION (ML) INTRAVENOUS AS NEEDED
Status: DISCONTINUED | OUTPATIENT
Start: 2022-07-07 | End: 2022-07-09 | Stop reason: HOSPADM

## 2022-07-07 RX ORDER — DOCUSATE SODIUM 100 MG/1
100 CAPSULE, LIQUID FILLED ORAL DAILY
Status: DISCONTINUED | OUTPATIENT
Start: 2022-07-07 | End: 2022-07-07 | Stop reason: SDUPTHER

## 2022-07-07 RX ORDER — HYDROCODONE BITARTRATE AND ACETAMINOPHEN 5; 325 MG/1; MG/1
1 TABLET ORAL
Status: DISCONTINUED | OUTPATIENT
Start: 2022-07-07 | End: 2022-07-09 | Stop reason: HOSPADM

## 2022-07-07 RX ORDER — SODIUM CHLORIDE 0.9 % (FLUSH) 0.9 %
5-40 SYRINGE (ML) INJECTION AS NEEDED
Status: DISCONTINUED | OUTPATIENT
Start: 2022-07-07 | End: 2022-07-09 | Stop reason: HOSPADM

## 2022-07-07 RX ORDER — SODIUM CHLORIDE, SODIUM LACTATE, POTASSIUM CHLORIDE, CALCIUM CHLORIDE 600; 310; 30; 20 MG/100ML; MG/100ML; MG/100ML; MG/100ML
125 INJECTION, SOLUTION INTRAVENOUS CONTINUOUS
Status: DISCONTINUED | OUTPATIENT
Start: 2022-07-07 | End: 2022-07-09 | Stop reason: HOSPADM

## 2022-07-07 RX ORDER — SIMETHICONE 80 MG
80 TABLET,CHEWABLE ORAL 4 TIMES DAILY
Status: DISCONTINUED | OUTPATIENT
Start: 2022-07-07 | End: 2022-07-09 | Stop reason: HOSPADM

## 2022-07-07 RX ORDER — DIPHENHYDRAMINE HYDROCHLORIDE 50 MG/ML
12.5 INJECTION, SOLUTION INTRAMUSCULAR; INTRAVENOUS
Status: DISCONTINUED | OUTPATIENT
Start: 2022-07-07 | End: 2022-07-09 | Stop reason: HOSPADM

## 2022-07-07 RX ORDER — SODIUM CHLORIDE 0.9 % (FLUSH) 0.9 %
5-40 SYRINGE (ML) INJECTION EVERY 8 HOURS
Status: DISCONTINUED | OUTPATIENT
Start: 2022-07-07 | End: 2022-07-09 | Stop reason: HOSPADM

## 2022-07-07 RX ORDER — IBUPROFEN 800 MG/1
800 TABLET ORAL EVERY 8 HOURS
Status: DISCONTINUED | OUTPATIENT
Start: 2022-07-07 | End: 2022-07-09 | Stop reason: HOSPADM

## 2022-07-07 RX ORDER — HYDROCODONE BITARTRATE AND ACETAMINOPHEN 10; 325 MG/1; MG/1
1 TABLET ORAL
Status: DISCONTINUED | OUTPATIENT
Start: 2022-07-07 | End: 2022-07-09 | Stop reason: HOSPADM

## 2022-07-07 RX ORDER — DOCUSATE SODIUM 100 MG/1
100 CAPSULE, LIQUID FILLED ORAL 2 TIMES DAILY
Status: DISCONTINUED | OUTPATIENT
Start: 2022-07-07 | End: 2022-07-09 | Stop reason: HOSPADM

## 2022-07-07 RX ORDER — OXYTOCIN/RINGER'S LACTATE 30/500 ML
87.3 PLASTIC BAG, INJECTION (ML) INTRAVENOUS AS NEEDED
Status: DISCONTINUED | OUTPATIENT
Start: 2022-07-07 | End: 2022-07-09 | Stop reason: HOSPADM

## 2022-07-07 RX ORDER — IBUPROFEN 800 MG/1
800 TABLET ORAL
Qty: 60 TABLET | Refills: 1 | Status: SHIPPED | OUTPATIENT
Start: 2022-07-07 | End: 2022-08-02

## 2022-07-07 RX ORDER — NALOXONE HYDROCHLORIDE 0.4 MG/ML
0.4 INJECTION, SOLUTION INTRAMUSCULAR; INTRAVENOUS; SUBCUTANEOUS AS NEEDED
Status: DISCONTINUED | OUTPATIENT
Start: 2022-07-07 | End: 2022-07-09 | Stop reason: HOSPADM

## 2022-07-07 RX ORDER — HYDROCODONE BITARTRATE AND ACETAMINOPHEN 5; 325 MG/1; MG/1
1 TABLET ORAL
Qty: 20 TABLET | Refills: 0 | Status: SHIPPED | OUTPATIENT
Start: 2022-07-07 | End: 2022-07-10

## 2022-07-07 RX ADMIN — HYDROCODONE BITARTRATE AND ACETAMINOPHEN 1 TABLET: 5; 325 TABLET ORAL at 13:59

## 2022-07-07 RX ADMIN — IBUPROFEN 800 MG: 800 TABLET, FILM COATED ORAL at 13:59

## 2022-07-07 RX ADMIN — IBUPROFEN 800 MG: 800 TABLET, FILM COATED ORAL at 22:08

## 2022-07-07 RX ADMIN — SIMETHICONE 80 MG: 80 TABLET, CHEWABLE ORAL at 18:07

## 2022-07-07 RX ADMIN — HYDROCODONE BITARTRATE AND ACETAMINOPHEN 1 TABLET: 5; 325 TABLET ORAL at 22:07

## 2022-07-07 RX ADMIN — DIPHENHYDRAMINE HYDROCHLORIDE 12.5 MG: 50 INJECTION, SOLUTION INTRAMUSCULAR; INTRAVENOUS at 02:26

## 2022-07-07 RX ADMIN — SIMETHICONE 80 MG: 80 TABLET, CHEWABLE ORAL at 13:59

## 2022-07-07 RX ADMIN — DOCUSATE SODIUM 100 MG: 100 CAPSULE, LIQUID FILLED ORAL at 05:09

## 2022-07-07 RX ADMIN — SIMETHICONE 80 MG: 80 TABLET, CHEWABLE ORAL at 22:08

## 2022-07-07 RX ADMIN — HYDROCODONE BITARTRATE AND ACETAMINOPHEN 1 TABLET: 5; 325 TABLET ORAL at 18:07

## 2022-07-07 RX ADMIN — SODIUM CHLORIDE, POTASSIUM CHLORIDE, SODIUM LACTATE AND CALCIUM CHLORIDE 125 ML/HR: 600; 310; 30; 20 INJECTION, SOLUTION INTRAVENOUS at 01:42

## 2022-07-07 RX ADMIN — OXYCODONE 5 MG: 5 TABLET ORAL at 05:10

## 2022-07-07 RX ADMIN — DOCUSATE SODIUM 100 MG: 100 CAPSULE, LIQUID FILLED ORAL at 18:07

## 2022-07-07 RX ADMIN — ONDANSETRON 4 MG: 2 INJECTION INTRAMUSCULAR; INTRAVENOUS at 04:57

## 2022-07-07 RX ADMIN — KETOROLAC TROMETHAMINE 30 MG: 30 INJECTION, SOLUTION INTRAMUSCULAR at 07:39

## 2022-07-07 RX ADMIN — KETOROLAC TROMETHAMINE 30 MG: 30 INJECTION, SOLUTION INTRAMUSCULAR at 01:42

## 2022-07-07 RX ADMIN — DIPHENHYDRAMINE HYDROCHLORIDE 12.5 MG: 50 INJECTION, SOLUTION INTRAMUSCULAR; INTRAVENOUS at 10:47

## 2022-07-07 NOTE — ROUTINE PROCESS
Bedside and verbal shift change report given to oncoming nurse, as assigned, by offgoing nurse, Lotus Danielson RN. Report included SBAR, Kardex, I&Os, Recent Results, Procedures, MAR, and changes in patient status. Oncoming nurse and patient given opportunity for questions.

## 2022-07-07 NOTE — ROUTINE PROCESS
Bedside and Verbal shift change report given to KATT Crook RN (oncoming nurse) by AMIRA Stockton RN (offgoing nurse). Report included the following information SBAR, Kardex, Intake/Output, MAR and Recent Results.

## 2022-07-07 NOTE — PROGRESS NOTES
2230: Mendiola catheter remains in place d/t patient's output and urine assessment. Will continue to reassess.

## 2022-07-07 NOTE — ADT AUTH CERT NOTES
LABOR AND DELIVERY NOTIFICATION     ADMISSION DATE 2022    STILL IN HOUSE     UR CONTACT IS JOSE GUADALUPE RIGGS.     PLEASE FAX BACK AUTH REF NUMBER, CLINICAL NEEDS AND STATUS TO ME [phone]    THE [facility] SO MUCH! [address]      FACILITY NPI : [id number]  FACILITY TAX ID : [id number]     [address]             Patient Name :Los Jacobo   : 1990 (32 yrs)  MRN : [id number]     Patient Mailing Address [address] [[address]] , [address]                                                             .         Insurance Plan Payor: BLUE CROSS   Primary Coverage Subscriber ID :          Current Patient Class : INPATIENT  Admit Date : 2022     REQUESTED LEVEL OF CARE: INPATIENT [101]                                                           Diagnosis : Pregnancy                          ICD10 Code : Previous  delivery affecting pregnancy [O34.219]  Pregnancy [Z34.90]        Admitting and Attending Info:  Admitting Provider : Kati Granger MD     NPI: [id number]  Admitting Provider Phone. [phone]  Admitting Provider Address:   [address] [place] [id number]                Patient Demographics    Patient Name   Emily Galan Legal Sex   Female    1990 Address   [address] [address] Phone   [phone] [facility]   [phone] [facility] Account    Name Acct ID Class Status Primary Coverage   Emily Galan [id number] INPATIENT Open [facility] - [address] [place] [address]            Guarantor Account (for Hospital Account [de-identified])    Name Relation to Pt Service Area Active?  Acct Type   Emily Galan Self [facility] Yes Personal/Family   Address Phone     Amairani Rendon [address] [place] [address] Cheikh Mckeon [phone](F)   [phone](P)       Coverage Information (for Hospital Account [de-identified])    F/O Payor/Plan Subscriber  Subscriber Sex Precert #   [place]OSS/[place] BL[place]OSS OUT OF STATE 86 F    Subscriber Subscriber #   Ana Livingston [id number]   Grp # Group Name       Address Phone   [address] [facility]    Policy Number Status Effective Date Benefits Phone   [id number] -  -   Auth/Cert               Admission Information    Arrival Date/Time: 2022 Admit Date/Time: 2022 IP Adm.  Date/Time: 2022   Admission Type: Elective Point of Origin: Physician Or Clinic Office Admit Category:    Means of Arrival:  Primary Service: Obstetrics Secondary Service: N/A   Transfer Source:  Service Area: [facility] Unit: [facility] [place] [facility] 3 MOTHER INFANT   Admit Provider: Aidan Malloy MD Attending Provider: Aidan Malloy MD Referring Provider:      Admission Information    Attending Provider Admission Dx Admitted on   Aidan Malloy MD Previous  delivery affecting pregnancy, Pregnancy 22   Service Isolation Code Status   OBSTETRICS -- Full Code   Allergies Advance Care Planning    Cephalexin, Pcn [Penicillins] Jump to the Activity       Admission Information    Unit/Bed: [facility] 3 MOTHER INFANT Service: OBSTETRICS   Admitting provider: Aidan Malloy MD Phone: [phone]   Attending provider: Aidan Malloy MD Phone: [phone]   PCP: Nayla Ludwig Phone: [phone]   Admission dx: Previous  delivery affecting pregnancy [O34.219] Patient class: I   Admission type: EL       Patient Demographics    Patient Name   Ray Clause   [id number] Legal Sex   Female    1990 Address   [address] [facility] [address] Phone   [phone] (Home)   [phone] (Mobile)     H&P Notes       H&P by Aidan Malloy MD at 22 1800 documented on Admission (Current) from 2022 in [facility] [place] [facility]"" 3 MOTHER INFANT    Author: Aidan Malloy MD Author Type: Physician Filed: 22    Note Status: Addendum Cosign: Cosign Not Required Date of Service: 22 1800   : Waqas Jha MD (Physician)       Prior Versions: 1. Waqas Jha MD (Physician) at 22 180 - Signed      History & Physical     Name: Rebecca Limon MRN: [id number]  SSN: [id number]    YOB: 1990  Age: 27 y.o. Sex: female          Subjective:      Estimated Date of Delivery: 22           OB History         4    Para   3    Term   3       0    AB   1    Living   3        SAB   1    IAB   0    Ectopic   0    Molar   0    Multiple   0    Live Births   3                   Ms. Simon Burnette is admitted with pregnancy at 39w0d for  Section. Prenatal course was normal. Please see prenatal records for details.                Problem List  Date Reviewed: 2022           Codes Class Noted     Pregnancy ICD-10-CM: Z34.90  ICD-9-CM: V22.2   2022           Supervision of normal pregnancy ICD-10-CM: Z34.90  ICD-9-CM: V22.1   2021     Overview Addendum 2022 10:14 AM by Henry HAMMONDS       Intrauterine pregnancy with the following problems identified:   [facility] 2022 D=US  Covid vaccine booster due   Flu vaccine 2021  NIPTS- normal female  Horizon- neg  Hx of CS x 2 - repeat at term  HSV II - suppression at 36 weeks  Delayed PPH PPD 5 - transfusion  Cruise   AFP- neg  Anemia  GBS Negative  Repeat C/S 22                 Fatty liver ICD-10-CM: K76.0  ICD-9-CM: 571.8   2020     Overview Signed 2020 11:48 AM by Jose Daniel Boykin PA-C       Ultrasound 2020                 Adult situational stress disorder ICD-10-CM: F43.20  ICD-9-CM: 308. 9   2017           Glucose intolerance (impaired glucose tolerance) ICD-10-CM: R73.02  ICD-9-CM: 790.22   Unknown           IBD (inflammatory bowel disease) ICD-10-CM: K52.9  ICD-9-CM: 558. 9   Unknown           Anxiety ICD-10-CM: F41.9  ICD-9-CM: 300.00   Unknown           ADHD (attention deficit hyperactivity disorder) ICD-10-CM: F90.9  ICD-9-CM: 314.01   2012           Insomnia ICD-10-CM: G47.00  ICD-9-CM: 780.52   4/3/2010           Depression ICD-10-CM: F32. A  ICD-9-CM: 454   4/3/2010                           Past Medical History:   Diagnosis Date    ADHD (attention deficit hyperactivity disorder)      Anemia      Anxiety      Bipolar 1 disorder (HCC)      Depression 4/3/2010    Genital herpes      Glucose intolerance (impaired glucose tolerance)      Herpes simplex virus (HSV) infection      IBD (inflammatory bowel disease)      Insomnia 4/3/2010    Liver disease              Past Surgical History:   Procedure Laterality Date    HX  SECTION        NH BREAST SURGERY PROCEDURE UNLISTED               Social History            Occupational History    Occupation: [employer]   Tobacco Use    Smoking status: Former Smoker       Packs/day: 0.30       Years: 3.00       Pack years: 0.90       Types: Cigarettes    Smokeless tobacco: Never Used   Substance and Sexual Activity    Alcohol use: No       Alcohol/week: 0.0 standard drinks    Drug use: No    Sexual activity: Yes       Partners: Male      Family History   Adopted: Yes              Allergies   Allergen Reactions    Cephalexin Rash    Pcn [Penicillins] Rash              Prior to Admission medications    Medication Sig Start Date End Date Taking? Authorizing Provider   diphenhydrAMINE (BenadryL) 25 mg capsule Take 25 mg by mouth every six (6) hours as needed for Sleep.     Yes Provider, Historical   prenatal AMEW94/HSEY fum/folic (prenatal vitamin) 27 mg iron- 800 mcg tab tablet Take 1 Tablet by mouth daily. 22   Yes Provider, Historical   ferrous sulfate 325 mg (65 mg iron) tablet Take 1 Tablet by mouth Daily (before breakfast).  22   Yes Ricky Sam MD   ondansetron hcl (ZOFRAN) 4 mg tablet TAKE 1 TABLET BY MOUTH EVERY 8 HOURS AS NEEDED FOR NAUSEA OR VOMITING 21   Yes Dallin Wisdom PA-C valACYclovir (VALTREX) 500 mg tablet Take  by mouth two (2) times a day.     Yes Provider, Historical   mesalamine ER (APRISO) 0.375 gram 24 hour capsule TAKE 4 CAPSULES BY MOUTH DAILY  Patient not taking: Reported on 7/6/2022 6/28/22     Provider, Historical   valACYclovir (VALTREX) 500 mg tablet Take 1 Tablet by mouth two (2) times a day. Patient not taking: Reported on 7/1/2022 6/24/22     Aidan Malloy MD   hydrocortisone-pramoxine ([facility] rectal foam Insert 1 Applicator into rectum three (3) times daily as needed for Hemorrhoids. Patient not taking: Reported on 4/18/2022 12/20/21     Aidan Malloy MD   ondansetron [facility] ODT) 8 mg disintegrating tablet Take 1 Tablet by mouth every eight (8) hours as needed for Nausea or Vomiting. Patient not taking: Reported on 4/18/2022 12/6/21     Aidan Malloy MD   miSOPROStoL (CYTOTEC) 200 mcg tablet 5 pills in vagina at bedtime, repeat in 12 hours and lie down for 2 hours if no result  Patient not taking: Reported on 7/6/2022 8/5/21     Aidan Malloy MD   ondansetron [facility] ODT) 8 mg disintegrating tablet Take 1 Tablet by mouth every eight (8) hours as needed for Nausea or Vomiting. Patient not taking: Reported on 4/18/2022 8/5/21     Aidan Malloy MD   loperamide (IMODIUM) 2 mg capsule TAKE 1 CAPSULE BY MOUTH FOUR TIMES DAILY AS NEEDED FOR DIARRHEA  Patient not taking: Reported on 7/6/2022 12/8/20     Dusty Shankar PA-C   buPROPion XL (WELLBUTRIN XL) 150 mg tablet Take 1 Tab by mouth every morning. Patient not taking: Reported on 7/6/2022 12/8/20     Dusty Shankar PA-C   NORETHINDRONE ACETATE PO norethindrone (contraceptive) 0.35 mg tablet  Patient not taking: Reported on 4/18/2022       Provider, Historical   mometasone (ELOCON) 0.1 % ointment Apply  to affected area daily.   Patient not taking: Reported on 4/18/2022 3/29/18     Jose A Cuevas PA-C         Review of Systems: A comprehensive review of systems was negative except for that written in the HPI.     Objective:      Vitals:         Vitals:     22 1209 22 1210 22 1215 22 1440   BP:   112/60   118/69   Pulse:   74   82   Resp:       18   Temp:       98.4 °F (36.9 °C)   SpO2: (!) 88% 100% 96% 99%   Weight:           Height:                 Physical Exam:  Patient without distress. Heart: Regular rate and rhythm  Lung: clear to auscultation throughout lung fields, no wheezes, no rales, no rhonchi and normal respiratory effort  Abdomen: soft, nontender  Fundus: soft and non tender  Perineum: blood absent, amniotic fluid absent  Membranes:  Intact  Fetal Heart Rate: Reactive     Prenatal Labs:         Lab Results   Component Value Date/Time     Rubella, External REACTIVE 2021 12:00 AM     GrBStrep, External NEGATIVE 2022 12:00 AM     HBsAg, External NEGATIVE 2021 12:00 AM     HIV, External NON REACTIVE  2021 12:00 AM     Chlamydia, External NEGATIVE  2021 12:00 AM         Assessment/Plan:      Plan: Admit for elective repeat CS x 3.   Group B Strep was negative     Signed By:  Rios Varela MD      2022                    Patient Demographics    Patient Name   Mikala Husbands   [id number] Legal Sex   Female    1990 Address   [place] [address] Phone   [phone] (Home)   [phone] (Mobile)   CSN:   [id number]     [facility] Date: Admit Time Room Bed   2022  6:22  [[id number]] 01 [[id number]]       Attending Providers    Provider Pager From Kuldip Nathan MD  22      Emergency Contact(s)    Name Relation Home Work Baldiwn Parent [phone]           [address]          [address] [place] [address]  Benjy Cesar [phone]       Patient: Jasmyn Pruitt  MRN: [id number]  BCM:      Valeria Corbin     MRN: [id number]       Link to Baby  Comment        [de-identified] name MRN Account  Age Sex Admission Type   Scarlet Hartman [id number] 22 1 days F Confirmed Admission - L&D      OB History       4    Para   3    Term   3       0    AB   1    Living   3      SAB   1    IAB   0    Ectopic   0    Molar   0    Multiple   0    Live Births   3         # Outcome Date GA Labor/2nd Weight Sex Delivery Anes PTL Lv A1 A5   1 SAB                 2 Term 18 41w0d  3.856 kg M CS-Unspec  N Living        3 Term 19 39w0d  3.175 kg F CS-Unspec  N Living        4 Term 22 39w0d  3.575 kg F CS-LTranv Spinal N Living     Name: Roderick Alas   Location: Other   Delivering Clinician: Ronnie Chapman MD        Prenatal History     Most Recent Value   Did You Receive Prenatal Care No   Name Of OB Provider --   Seen By MFM (Maternal Fetal Medicine)? Yes   Fetal Ultrasound Abnormalities/Concerns?  No   Infant Feeding Formula   Circumcision Planned --   Pediatrician After Birth/ Follow Up Baby Visits Dr. Manoj Adame     Dating Summary    Working MARGARITA: 22 based on Alternate MARGARITA Entry     Based On MARGARITA GA Diff GA User Date   Last Menstrual Period on 10/06/21 (Exact Date) 22 Same  System action - copied 21   Alternate MARGARITA Entry  0 Comment: Date entered prior to episode creation 22 Working  System action - copied 21     Prenatal Results      Prenatal Labs    Test Value Date Time   ABO/Rh * O POSITIVE  21    Antibody Scrn * NEGATIVE  21    Hgb      Hct      Platelets      Rubella * REACTIVE  21    RPR      T. Pallidum Antibody * NON REACTIVE  21    Urine      Hep B Surf Ag * NEGATIVE  21    Hep C * NON REACTIVE  21    HIV * NON REACTIVE   21    Gonorrhea      Chlamydia * NEGATIVE   21    TSH      GTT, 1 HR (Glucola)      GTT, Fasting      GTT, 1 HR      GTT, 2 HR      GTT, 3 HR        3rd Trimester    Test Value Date Time   Hgb      Hct      Platelets      Group B Strep * NEGATIVE  22    Antibody Scrn      TSH      T. Pallidum Antibody      Hep B Surf Ag Gonorrhea      Chlamydia         Screening/Diagnostics    Test Value Date Time   AFP Only      AFP Tetra      Hgb Electrophoresis      Amniocentesis      Cystic Fibrosis      Thalessemia      Julio Cesar-Sachs      Canavan      PAP Smear      Beta HCG      NT      NIPT      COVID-19        Lab    Test Value Date Time   GTT, Fasting      GTT, 1HR      GTT, 2HR      GTT, 3HR      RPR      Beta HCG      CMV Ab      Toxoplasma Ab      Varicella Zoster Ab           Legend    *: Historical  View all results for this pregnancy       Kely Pall, Female Echo Santos [[id number]]       Delivery    Birth date/time: 2024 10:19:00  Delivery type: , Low Transverse   categorization: Repeat   priority: Routine  Indications for : Prior Uterine Surgery  Complications: None    Labor Events     labor?: No   steroids?: None  Antibiotics during labor?: Yes  Rupture date/time: 2022 1019  Rupture type: AROM  Fluid color: Clear  Fluid odor: None  Trial of labor?: No  Cervical ripening: None  Induction: None  Augmentation: None  Complications: None    Delivery Providers    Delivering clinician: Esthela Castro MD  Provider Role   Esthela Castro MD Obstetrician   Yony Francis RN Primary Nurse   Chuy Candelaria, CRNA ARELI Pratida, RN Nursery Nurse   Luis Felipe Milner Charge Nurse     Anesthesia    Method: Spinal    Multiple Birth Onset Second Stage    No data filed    Operative Delivery    Forceps attempted?: No  Vacuum extractor attempted?: No    Presentation    Presentation: Vertex    Apgars    Living status: Living  Apgars:  1 min. :  5 min.:  10 min. :  15 min.:  20 min.:    Skin color:         Heart rate:         Reflex irritability:         Muscle tone:         Respiratory effort:          Total:           Resuscitation    Method: Tactile Stimulation, Suctioning-bulb    Shoulder Dystocia    Shoulder dystocia present?: No    Measurements    Weight: 3575 g  Weight (lbs): 7 lb 14.1 oz  Length:     Lacerations    Episiotomy: None    Lacerations: None  Repair Needed: None  # of Repair Packets:   Suture Type and Size:   Suture Comment:   Estimated Blood Loss (mL):       Placenta    Placenta Date/Time: 7/6/2022 1021  Removal: Expressed  Appearance: Normal Placenta disposition: Discarded     Vaginal Counts    Initial count personnel: N/A  Final count personnel: N/A        Delivery Summary History    Event User Date/Time   Signed Keven Howe RN 7/6/2022 14:41:15

## 2022-07-07 NOTE — PROGRESS NOTES
Post-Operative Day Number 1 Progress Note    Patient doing well post-op day 1 from  delivery without significant complaints. Pain controlled on current medication. Voiding without difficulty, normal lochia. bottlefeeding    Vitals:    Patient Vitals for the past 8 hrs:   BP Temp Pulse Resp SpO2   22 1557 105/75 98.8 °F (37.1 °C) 90 16 97 %     Temp (24hrs), Av.7 °F (37.1 °C), Min:98.5 °F (36.9 °C), Max:98.8 °F (37.1 °C)      Vital signs stable, afebrile. Exam:  Patient without distress. Abdomen soft, fundus firm at level of umbilicus, nontender. Incision dry and clean without erythema. Lower extremities are negative for swelling, cords or tenderness. Labs: No results found for this or any previous visit (from the past 24 hour(s)). Assessment and Plan:  Patient appears to be having uncomplicated post- course. Continue routine post-op care and maternal education.

## 2022-07-07 NOTE — DISCHARGE SUMMARY
Obstetrical Discharge Summary     Name: America Walton MRN: [id number]  SSN: [id number]    YOB: 1990  Age: 27 y.o. Sex: female      Admit Date: 2022    Discharge Date: 2022     Admitting Physician: Jonathan Pantoja MD     Attending Physician:  Nico Gentile MD     Admission Diagnoses: Previous  delivery affecting pregnancy [O34.219]; Pregnancy [Z34.90]    Discharge Diagnoses:   Information for the patient's :  Yudy Graves Female Lisandra Vela [[id number]]   Delivery of a 7 lb 14.1 oz (3.575 kg) female infant via , Low Transverse on 2022 at 10:19 AM  by Jonathan Pantoja. Apgars were   and  . Additional Diagnoses:   Hospital Problems  Date Reviewed: 2022          Codes Class Noted POA    Pregnancy ICD-10-CM: Z34.90  ICD-9-CM: V22.2  2022 Unknown             Lab Results   Component Value Date/Time    Rubella, External REACTIVE 2021 12:00 AM    GrBStrep, External NEGATIVE 2022 12:00 AM       Hospital Course: Normal hospital course following the delivery. Disposition: Home  Condition: Good    Patient Instructions:   Current Discharge Medication List      START taking these medications    Details   ibuprofen (MOTRIN) 800 mg tablet Take 1 Tablet by mouth every eight (8) hours as needed for Pain. Qty: 60 Tablet, Refills: 1      HYDROcodone-acetaminophen (NORCO) 5-325 mg per tablet Take 1 Tablet by mouth every four (4) hours as needed for Pain for up to 3 days. Max Daily Amount: 6 Tablets. Qty: 20 Tablet, Refills: 0    Associated Diagnoses: Postoperative pain         CONTINUE these medications which have NOT CHANGED    Details   diphenhydrAMINE (BenadryL) 25 mg capsule Take 25 mg by mouth every six (6) hours as needed for Sleep.      prenatal ODVE47/BEYI fum/folic (prenatal vitamin) 27 mg iron- 800 mcg tab tablet Take 1 Tablet by mouth daily. ferrous sulfate 325 mg (65 mg iron) tablet Take 1 Tablet by mouth Daily (before breakfast).   Qty: 90 Tablet, Refills: 4 ondansetron hcl (ZOFRAN) 4 mg tablet TAKE 1 TABLET BY MOUTH EVERY 8 HOURS AS NEEDED FOR NAUSEA OR VOMITING  Qty: 30 Tab, Refills: 0      !! valACYclovir (VALTREX) 500 mg tablet Take  by mouth two (2) times a day. mesalamine ER (APRISO) 0.375 gram 24 hour capsule TAKE 4 CAPSULES BY MOUTH DAILY      !! valACYclovir (VALTREX) 500 mg tablet Take 1 Tablet by mouth two (2) times a day. Qty: 60 Tablet, Refills: 1      hydrocortisone-pramoxine (PROCTOFOAM HC) rectal foam Insert 1 Applicator into rectum three (3) times daily as needed for Hemorrhoids. Qty: 1 Each, Refills: 2      !! ondansetron (ZOFRAN ODT) 8 mg disintegrating tablet Take 1 Tablet by mouth every eight (8) hours as needed for Nausea or Vomiting. Qty: 30 Tablet, Refills: 5      miSOPROStoL (CYTOTEC) 200 mcg tablet 5 pills in vagina at bedtime, repeat in 12 hours and lie down for 2 hours if no result  Qty: 10 Tablet, Refills: 0      !! ondansetron (ZOFRAN ODT) 8 mg disintegrating tablet Take 1 Tablet by mouth every eight (8) hours as needed for Nausea or Vomiting. Qty: 15 Tablet, Refills: 0      loperamide (IMODIUM) 2 mg capsule TAKE 1 CAPSULE BY MOUTH FOUR TIMES DAILY AS NEEDED FOR DIARRHEA  Qty: 120 Cap, Refills: 5    Associated Diagnoses: Crohn's disease without complication, unspecified gastrointestinal tract location (HCC)      buPROPion XL (WELLBUTRIN XL) 150 mg tablet Take 1 Tab by mouth every morning. Qty: 90 Tab, Refills: 1    Associated Diagnoses: Anxiety; Fatigue, unspecified type      NORETHINDRONE ACETATE PO norethindrone (contraceptive) 0.35 mg tablet      mometasone (ELOCON) 0.1 % ointment Apply  to affected area daily. Qty: 15 g, Refills: 1       !! - Potential duplicate medications found. Please discuss with provider. Reference my discharge instructions.     Follow-up Appointments   Procedures    FOLLOW UP VISIT Appointment in: 6 Weeks     Standing Status:   Standing     Number of Occurrences:   1     Order Specific Question:   Appointment in     Answer:   6 Weeks        Signed By:  Fab Harmon MD     July 7, 2022

## 2022-07-08 LAB
BASOPHILS # BLD: 0 K/UL (ref 0–0.1)
BASOPHILS NFR BLD: 1 % (ref 0–1)
DIFFERENTIAL METHOD BLD: ABNORMAL
EOSINOPHIL # BLD: 0.1 K/UL (ref 0–0.4)
EOSINOPHIL NFR BLD: 2 % (ref 0–7)
ERYTHROCYTE [DISTWIDTH] IN BLOOD BY AUTOMATED COUNT: 16.9 % (ref 11.5–14.5)
HCT VFR BLD AUTO: 30.2 % (ref 35–47)
HGB BLD-MCNC: 9.8 G/DL (ref 11.5–16)
IMM GRANULOCYTES # BLD AUTO: 0 K/UL (ref 0–0.04)
IMM GRANULOCYTES NFR BLD AUTO: 0 % (ref 0–0.5)
LYMPHOCYTES # BLD: 1.8 K/UL (ref 0.8–3.5)
LYMPHOCYTES NFR BLD: 25 % (ref 12–49)
MCH RBC QN AUTO: 25.8 PG (ref 26–34)
MCHC RBC AUTO-ENTMCNC: 32.5 G/DL (ref 30–36.5)
MCV RBC AUTO: 79.5 FL (ref 80–99)
MONOCYTES # BLD: 0.5 K/UL (ref 0–1)
MONOCYTES NFR BLD: 7 % (ref 5–13)
NEUTS SEG # BLD: 4.6 K/UL (ref 1.8–8)
NEUTS SEG NFR BLD: 65 % (ref 32–75)
NRBC # BLD: 0 K/UL (ref 0–0.01)
NRBC BLD-RTO: 0 PER 100 WBC
PLATELET # BLD AUTO: 223 K/UL (ref 150–400)
PMV BLD AUTO: 9.5 FL (ref 8.9–12.9)
RBC # BLD AUTO: 3.8 M/UL (ref 3.8–5.2)
WBC # BLD AUTO: 7.1 K/UL (ref 3.6–11)

## 2022-07-08 PROCEDURE — 65270000029 HC RM PRIVATE

## 2022-07-08 PROCEDURE — 74011250637 HC RX REV CODE- 250/637: Performed by: OBSTETRICS & GYNECOLOGY

## 2022-07-08 PROCEDURE — 85025 COMPLETE CBC W/AUTO DIFF WBC: CPT

## 2022-07-08 PROCEDURE — 36415 COLL VENOUS BLD VENIPUNCTURE: CPT

## 2022-07-08 RX ORDER — LOPERAMIDE HYDROCHLORIDE 2 MG/1
2 CAPSULE ORAL
Status: DISCONTINUED | OUTPATIENT
Start: 2022-07-08 | End: 2022-07-09 | Stop reason: HOSPADM

## 2022-07-08 RX ORDER — ONDANSETRON 4 MG/1
4 TABLET, ORALLY DISINTEGRATING ORAL
Status: DISCONTINUED | OUTPATIENT
Start: 2022-07-08 | End: 2022-07-09 | Stop reason: HOSPADM

## 2022-07-08 RX ADMIN — HYDROCODONE BITARTRATE AND ACETAMINOPHEN 1 TABLET: 5; 325 TABLET ORAL at 13:57

## 2022-07-08 RX ADMIN — IBUPROFEN 800 MG: 800 TABLET, FILM COATED ORAL at 13:57

## 2022-07-08 RX ADMIN — IBUPROFEN 800 MG: 800 TABLET, FILM COATED ORAL at 06:16

## 2022-07-08 RX ADMIN — HYDROCODONE BITARTRATE AND ACETAMINOPHEN 1 TABLET: 5; 325 TABLET ORAL at 02:39

## 2022-07-08 RX ADMIN — HYDROCODONE BITARTRATE AND ACETAMINOPHEN 1 TABLET: 5; 325 TABLET ORAL at 06:16

## 2022-07-08 RX ADMIN — HYDROCODONE BITARTRATE AND ACETAMINOPHEN 1 TABLET: 5; 325 TABLET ORAL at 09:47

## 2022-07-08 RX ADMIN — DOCUSATE SODIUM 100 MG: 100 CAPSULE, LIQUID FILLED ORAL at 09:47

## 2022-07-08 RX ADMIN — ONDANSETRON 4 MG: 4 TABLET, ORALLY DISINTEGRATING ORAL at 23:15

## 2022-07-08 RX ADMIN — SIMETHICONE 80 MG: 80 TABLET, CHEWABLE ORAL at 21:47

## 2022-07-08 RX ADMIN — SIMETHICONE 80 MG: 80 TABLET, CHEWABLE ORAL at 13:57

## 2022-07-08 RX ADMIN — SIMETHICONE 80 MG: 80 TABLET, CHEWABLE ORAL at 09:47

## 2022-07-08 RX ADMIN — HYDROCODONE BITARTRATE AND ACETAMINOPHEN 1 TABLET: 5; 325 TABLET ORAL at 20:23

## 2022-07-08 RX ADMIN — IBUPROFEN 800 MG: 800 TABLET, FILM COATED ORAL at 21:47

## 2022-07-08 NOTE — ROUTINE PROCESS
Bedside and Verbal shift change report given to THERESA Cowan RN (oncoming nurse) by AMIRA Mcnamara RN (offgoing nurse). Report included the following information SBAR, Kardex, Intake/Output and MAR.

## 2022-07-08 NOTE — PROGRESS NOTES
Post-Operative Day 2 Progress Note    Patient now post-op day 2 following  delivery. No significant complaints. Pain controlled on medication. Voiding without difficulty, normal lochia. Vitals:  Patient Vitals for the past 8 hrs:   BP Temp Pulse Resp SpO2   22 0736 117/71 97.8 °F (36.6 °C) 77 16 97 %     Temp (24hrs), Av.4 °F (36.9 °C), Min:97.8 °F (36.6 °C), Max:98.8 °F (37.1 °C)      Vital signs stable, afebrile. Exam:  Patient without distress. Abdomen soft, flat, non-tender with fundus firm. Incision  clean, dry, and intact without erythema. Lower extremities are negative for swelling, cords or tenderness. .    Labs:   Recent Results (from the past 24 hour(s))   CBC WITH AUTOMATED DIFF    Collection Time: 22  4:05 AM   Result Value Ref Range    WBC 7.1 3.6 - 11.0 K/uL    RBC 3.80 3.80 - 5.20 M/uL    HGB 9.8 (L) 11.5 - 16.0 g/dL    HCT 30.2 (L) 35.0 - 47.0 %    MCV 79.5 (L) 80.0 - 99.0 FL    MCH 25.8 (L) 26.0 - 34.0 PG    MCHC 32.5 30.0 - 36.5 g/dL    RDW 16.9 (H) 11.5 - 14.5 %    PLATELET 046 265 - 214 K/uL    MPV 9.5 8.9 - 12.9 FL    NRBC 0.0 0  WBC    ABSOLUTE NRBC 0.00 0.00 - 0.01 K/uL    NEUTROPHILS 65 32 - 75 %    LYMPHOCYTES 25 12 - 49 %    MONOCYTES 7 5 - 13 %    EOSINOPHILS 2 0 - 7 %    BASOPHILS 1 0 - 1 %    IMMATURE GRANULOCYTES 0 0.0 - 0.5 %    ABS. NEUTROPHILS 4.6 1.8 - 8.0 K/UL    ABS. LYMPHOCYTES 1.8 0.8 - 3.5 K/UL    ABS. MONOCYTES 0.5 0.0 - 1.0 K/UL    ABS. EOSINOPHILS 0.1 0.0 - 0.4 K/UL    ABS. BASOPHILS 0.0 0.0 - 0.1 K/UL    ABS. IMM. GRANS. 0.0 0.00 - 0.04 K/UL    DF AUTOMATED         Assessment and Plan:  Patient doing well post- day 2. Continue routine post-op care and maternal education.

## 2022-07-09 VITALS
SYSTOLIC BLOOD PRESSURE: 112 MMHG | TEMPERATURE: 98.1 F | HEART RATE: 70 BPM | WEIGHT: 193 LBS | BODY MASS INDEX: 37.89 KG/M2 | DIASTOLIC BLOOD PRESSURE: 60 MMHG | OXYGEN SATURATION: 96 % | HEIGHT: 60 IN | RESPIRATION RATE: 16 BRPM

## 2022-07-09 PROCEDURE — 74011250637 HC RX REV CODE- 250/637: Performed by: OBSTETRICS & GYNECOLOGY

## 2022-07-09 RX ORDER — ACETAMINOPHEN 325 MG/1
650 TABLET ORAL
Status: DISCONTINUED | OUTPATIENT
Start: 2022-07-09 | End: 2022-07-09 | Stop reason: HOSPADM

## 2022-07-09 RX ADMIN — LOPERAMIDE HYDROCHLORIDE 2 MG: 2 CAPSULE ORAL at 00:08

## 2022-07-09 RX ADMIN — LOPERAMIDE HYDROCHLORIDE 2 MG: 2 CAPSULE ORAL at 06:24

## 2022-07-09 RX ADMIN — ONDANSETRON 4 MG: 4 TABLET, ORALLY DISINTEGRATING ORAL at 05:56

## 2022-07-09 RX ADMIN — ACETAMINOPHEN 650 MG: 325 TABLET ORAL at 05:56

## 2022-07-09 RX ADMIN — ACETAMINOPHEN 650 MG: 325 TABLET ORAL at 00:08

## 2022-07-09 RX ADMIN — SIMETHICONE 80 MG: 80 TABLET, CHEWABLE ORAL at 05:56

## 2022-07-09 NOTE — PROGRESS NOTES
Patient discharged to home. Education completed. Patient reported she had no more questions. Bands verified on patient and infant, see footprint sheet. Infant placed in carseat by parent.   Prescriptions: Motrin and Norco

## 2022-07-09 NOTE — DISCHARGE INSTRUCTIONS
POST DELIVERY DISCHARGE INSTRUCTIONS    Name: Boaz Moss  YOB: 1990  Primary Diagnosis: Active Problems:    Pregnancy (2022)        General:     Diet/Diet Restrictions:  Eight 8-ounce glasses of fluid daily (water, juices); avoid excessive caffeine intake. Meals/snacks as desired which are high in fiber and carbohydrates and low in fat and cholesterol. Medications:   {Medication reconciliation information is now added to the patient's AVS automatically when it is printed. There is no need to use this SmartLink in discharge instructions. Highlight this text and delete it to clear this message}      Physical Activity / Restrictions / Safety:     Avoid heavy lifting, no more that 8 lbs. For 2-3 weeks; No driving while taking narcotic pain medication. Post  patients should not drive until pain free. No intercourse 4-6 weeks, no douching or tampon use. May resume exercise in 6 weeks. Discharge Instructions/Special Treatment/Home Care Needs:     Continue prenatal vitamins. Continue to use squirt bottle with warm water on your episiotomy after each bathroom use until bleeding stops. If steri-strips applied to your incision, remove in 7 days. Take stool softeners daily. Call your doctor for the following:     Fever over 101 degrees by mouth. Vaginal bleeding heavier than a normal menstrual period or lost larger than a golf ball. Red streaks or increased swelling of legs, painful red streaks on your breast.  Painful urination, or increased pain, redness or discharge with your incision. Pain Management:     Pain Management:   Take Acetaminophen (Tylenol) or Ibuprofen (Advil, Motrin), as directed for pain. Use a warm Sitz bath 3 times daily to relieve episiotomy or hemorrhoidal discomfort. Heating pad to  incision as needed. For hemorrhoidal discomfort, use Tucks and Anusol cream as needed and directed.     Follow-Up Care:     Appointment with MD:   Follow-up Appointments   Procedures    FOLLOW UP VISIT Appointment in: 6 Weeks     Standing Status:   Standing     Number of Occurrences:   1     Order Specific Question:   Appointment in     Answer:   Noble Arizmendi     Telephone number: [phone]    Signed By: Jj Bustos MD                                                                                                   Date: 7/7/2022 Time: 5:47 PM

## 2022-07-09 NOTE — PROGRESS NOTES
Post-Operative Day Number 3 Progress/Discharge Note    Patient doing well post-op day 3 from  delivery without significant complaints. Having Crohn's flare with some nausea and diarrhea. Pain controlled on current medication. Voiding without difficulty, normal lochia. Tolerating regular diet. +flatus    Vitals:  Patient Vitals for the past 8 hrs:   BP Temp Pulse Resp SpO2   22 0822 112/60 98.1 °F (36.7 °C) 70 16 96 %     Temp (24hrs), Av.2 °F (36.8 °C), Min:98.1 °F (36.7 °C), Max:98.5 °F (36.9 °C)        Exam:  Patient without distress. Lungs:  CTA bilaterally               CV:  Regular rate and rhythm               Abdomen soft, nondistended, normal bowel sounds               Uterus: fundus firm at level of umbilicus, nontender. Incision:  Intact,  without erythema, exudate, or induration. Lower extremities are negative for cords or tenderness; no swelling. Labs: No results found for this or any previous visit (from the past 24 hour(s)). Lab Results   Component Value Date/Time    Rubella, External REACTIVE 2021 12:00 AM    GrBStrep, External NEGATIVE 2022 12:00 AM    HBsAg, External NEGATIVE 2021 12:00 AM    HIV, External NON REACTIVE  2021 12:00 AM    Chlamydia, External NEGATIVE  2021 12:00 AM       Assessment and Plan:  Postoperative day #3, S/P C/S. Doing well. - discharge to home   - d/c meds per Dr. Megan Carrasco   - f/u in office 6wks, sooner prn.  - Crohn's flare. OK to take usual meds.   Advised to contact her GI if additional concerns/questions

## 2022-07-09 NOTE — DISCHARGE SUMMARY
Obstetrical Discharge Summary     Name: Hermes Guzmán MRN: [id number]  SSN: [id number]    YOB: 1990  Age: 27 y.o. Sex: female      Admit Date: 2022    Discharge Date: No discharge date for patient encounter. Admitting Physician: Aracelis Man MD     Attending Physician:  Stefania Almanzar MD     Admission Diagnoses: Previous  delivery affecting pregnancy [O34.219]; Pregnancy [Z34.90]    Procedures for this admission: Procedure(s):   SECTION    Discharge Diagnoses:   Information for the patient's :  Edison Shiroman Female Evan Ramirez [[id number]]   Delivery of a 7 lb 14.1 oz (3.575 kg) female infant via , Low Transverse on 2022 at 10:19 AM  by Aracelis Man. Apgars were   and  . Discharge Condition: good    Disposition:  home      Hospital Course: Crohn's flare day of discharge, otherwise uncomplicated postpartum course. Patient Instructions:   Current Discharge Medication List      START taking these medications    Details   ibuprofen (MOTRIN) 800 mg tablet Take 1 Tablet by mouth every eight (8) hours as needed for Pain. Qty: 60 Tablet, Refills: 1      HYDROcodone-acetaminophen (NORCO) 5-325 mg per tablet Take 1 Tablet by mouth every four (4) hours as needed for Pain for up to 3 days. Max Daily Amount: 6 Tablets. Qty: 20 Tablet, Refills: 0    Associated Diagnoses: Postoperative pain         CONTINUE these medications which have NOT CHANGED    Details   diphenhydrAMINE (BenadryL) 25 mg capsule Take 25 mg by mouth every six (6) hours as needed for Sleep.      prenatal IFKK72/DPGW fum/folic (prenatal vitamin) 27 mg iron- 800 mcg tab tablet Take 1 Tablet by mouth daily. ferrous sulfate 325 mg (65 mg iron) tablet Take 1 Tablet by mouth Daily (before breakfast).   Qty: 90 Tablet, Refills: 4      ondansetron hcl (ZOFRAN) 4 mg tablet TAKE 1 TABLET BY MOUTH EVERY 8 HOURS AS NEEDED FOR NAUSEA OR VOMITING  Qty: 30 Tab, Refills: 0         STOP taking these medications valACYclovir (VALTREX) 500 mg tablet Comments:   Reason for Stopping:               Reference my discharge instructions.     Follow-up Appointments   Procedures    FOLLOW UP VISIT Appointment in: 6 Weeks     Standing Status:   Standing     Number of Occurrences:   1     Order Specific Question:   Appointment in     Answer:   6 Weeks        Signed By:  Jean Hong MD     July 9, 2022

## 2022-07-09 NOTE — ROUTINE PROCESS
Bedside and Verbal shift change report given to DAVID Tanner RN (oncoming nurse) by AMIRA Be RN (offgoing nurse). Report included the following information SBAR, Kardex, Intake/Output and MAR.

## 2022-07-11 ENCOUNTER — APPOINTMENT (OUTPATIENT)
Dept: CT IMAGING | Age: 32
End: 2022-07-11
Attending: EMERGENCY MEDICINE
Payer: COMMERCIAL

## 2022-07-11 ENCOUNTER — HOSPITAL ENCOUNTER (EMERGENCY)
Age: 32
Discharge: HOME | End: 2022-07-11
Attending: EMERGENCY MEDICINE
Payer: COMMERCIAL

## 2022-07-11 VITALS
HEART RATE: 80 BPM | OXYGEN SATURATION: 99 % | RESPIRATION RATE: 16 BRPM | WEIGHT: 184.3 LBS | SYSTOLIC BLOOD PRESSURE: 115 MMHG | DIASTOLIC BLOOD PRESSURE: 68 MMHG | BODY MASS INDEX: 36.18 KG/M2 | TEMPERATURE: 98.6 F | HEIGHT: 60 IN

## 2022-07-11 DIAGNOSIS — R06.00 DYSPNEA, UNSPECIFIED TYPE: Primary | ICD-10-CM

## 2022-07-11 LAB
ALBUMIN SERPL-MCNC: 2.7 G/DL (ref 3.5–5)
ALBUMIN/GLOB SERPL: 0.6 {RATIO} (ref 1.1–2.2)
ALP SERPL-CCNC: 124 U/L (ref 45–117)
ALT SERPL-CCNC: 79 U/L (ref 12–78)
ANION GAP SERPL CALC-SCNC: 8 MMOL/L (ref 5–15)
AST SERPL-CCNC: 110 U/L (ref 15–37)
ATRIAL RATE: 63 BPM
BASOPHILS # BLD: 0.1 K/UL (ref 0–0.1)
BASOPHILS NFR BLD: 1 % (ref 0–1)
BILIRUB SERPL-MCNC: 0.5 MG/DL (ref 0.2–1)
BNP SERPL-MCNC: 242 PG/ML
BUN SERPL-MCNC: 9 MG/DL (ref 6–20)
BUN/CREAT SERPL: 14 (ref 12–20)
CALCIUM SERPL-MCNC: 9.2 MG/DL (ref 8.5–10.1)
CALCULATED P AXIS, ECG09: 19 DEGREES
CALCULATED R AXIS, ECG10: -7 DEGREES
CALCULATED T AXIS, ECG11: 25 DEGREES
CHLORIDE SERPL-SCNC: 107 MMOL/L (ref 97–108)
CO2 SERPL-SCNC: 26 MMOL/L (ref 21–32)
CREAT SERPL-MCNC: 0.63 MG/DL (ref 0.55–1.02)
DIAGNOSIS, 93000: NORMAL
DIFFERENTIAL METHOD BLD: ABNORMAL
EOSINOPHIL # BLD: 0.1 K/UL (ref 0–0.4)
EOSINOPHIL NFR BLD: 2 % (ref 0–7)
ERYTHROCYTE [DISTWIDTH] IN BLOOD BY AUTOMATED COUNT: 16.1 % (ref 11.5–14.5)
GLOBULIN SER CALC-MCNC: 4.2 G/DL (ref 2–4)
GLUCOSE SERPL-MCNC: 99 MG/DL (ref 65–100)
HCT VFR BLD AUTO: 36.2 % (ref 35–47)
HGB BLD-MCNC: 11.8 G/DL (ref 11.5–16)
IMM GRANULOCYTES # BLD AUTO: 0 K/UL (ref 0–0.04)
IMM GRANULOCYTES NFR BLD AUTO: 0 % (ref 0–0.5)
LYMPHOCYTES # BLD: 1.2 K/UL (ref 0.8–3.5)
LYMPHOCYTES NFR BLD: 21 % (ref 12–49)
MCH RBC QN AUTO: 25.5 PG (ref 26–34)
MCHC RBC AUTO-ENTMCNC: 32.6 G/DL (ref 30–36.5)
MCV RBC AUTO: 78.2 FL (ref 80–99)
MONOCYTES # BLD: 0.4 K/UL (ref 0–1)
MONOCYTES NFR BLD: 6 % (ref 5–13)
NEUTS SEG # BLD: 4.2 K/UL (ref 1.8–8)
NEUTS SEG NFR BLD: 70 % (ref 32–75)
NRBC # BLD: 0 K/UL (ref 0–0.01)
NRBC BLD-RTO: 0 PER 100 WBC
P-R INTERVAL, ECG05: 152 MS
PLATELET # BLD AUTO: 313 K/UL (ref 150–400)
PMV BLD AUTO: 9.2 FL (ref 8.9–12.9)
POTASSIUM SERPL-SCNC: 3.8 MMOL/L (ref 3.5–5.1)
PROT SERPL-MCNC: 6.9 G/DL (ref 6.4–8.2)
Q-T INTERVAL, ECG07: 380 MS
QRS DURATION, ECG06: 72 MS
QTC CALCULATION (BEZET), ECG08: 388 MS
RBC # BLD AUTO: 4.63 M/UL (ref 3.8–5.2)
SODIUM SERPL-SCNC: 141 MMOL/L (ref 136–145)
TROPONIN-HIGH SENSITIVITY: 6 NG/L (ref 0–51)
VENTRICULAR RATE, ECG03: 63 BPM
WBC # BLD AUTO: 5.9 K/UL (ref 3.6–11)

## 2022-07-11 PROCEDURE — 71275 CT ANGIOGRAPHY CHEST: CPT

## 2022-07-11 PROCEDURE — 80053 COMPREHEN METABOLIC PANEL: CPT

## 2022-07-11 PROCEDURE — 84484 ASSAY OF TROPONIN QUANT: CPT

## 2022-07-11 PROCEDURE — 93005 ELECTROCARDIOGRAM TRACING: CPT

## 2022-07-11 PROCEDURE — 36415 COLL VENOUS BLD VENIPUNCTURE: CPT

## 2022-07-11 PROCEDURE — 74011250636 HC RX REV CODE- 250/636: Performed by: EMERGENCY MEDICINE

## 2022-07-11 PROCEDURE — 99285 EMERGENCY DEPT VISIT HI MDM: CPT

## 2022-07-11 PROCEDURE — 74011000636 HC RX REV CODE- 636: Performed by: EMERGENCY MEDICINE

## 2022-07-11 PROCEDURE — 96375 TX/PRO/DX INJ NEW DRUG ADDON: CPT

## 2022-07-11 PROCEDURE — 85025 COMPLETE CBC W/AUTO DIFF WBC: CPT

## 2022-07-11 PROCEDURE — 96374 THER/PROPH/DIAG INJ IV PUSH: CPT

## 2022-07-11 PROCEDURE — 83880 ASSAY OF NATRIURETIC PEPTIDE: CPT

## 2022-07-11 RX ORDER — DIPHENHYDRAMINE HYDROCHLORIDE 50 MG/ML
50 INJECTION, SOLUTION INTRAMUSCULAR; INTRAVENOUS
Status: COMPLETED | OUTPATIENT
Start: 2022-07-11 | End: 2022-07-11

## 2022-07-11 RX ORDER — ALBUTEROL SULFATE 90 UG/1
2 AEROSOL, METERED RESPIRATORY (INHALATION)
Qty: 1 EACH | Refills: 0 | Status: SHIPPED | OUTPATIENT
Start: 2022-07-11 | End: 2022-08-02

## 2022-07-11 RX ADMIN — IOPAMIDOL 100 ML: 755 INJECTION, SOLUTION INTRAVENOUS at 18:19

## 2022-07-11 RX ADMIN — METHYLPREDNISOLONE SODIUM SUCCINATE 40 MG: 40 INJECTION, POWDER, FOR SOLUTION INTRAMUSCULAR; INTRAVENOUS at 14:21

## 2022-07-11 RX ADMIN — DIPHENHYDRAMINE HYDROCHLORIDE 50 MG: 50 INJECTION, SOLUTION INTRAMUSCULAR; INTRAVENOUS at 14:22

## 2022-07-11 NOTE — ED PROVIDER NOTES
Shortness of Breath  This is a new problem. The problem occurs intermittently. The current episode started 2 days ago. The problem has not changed since onset. Associated symptoms include PND. Pertinent negatives include no fever, no headaches, no coryza, no rhinorrhea, no sore throat, no swollen glands, no ear pain, no neck pain, no cough, no sputum production, no hemoptysis, no wheezing, no orthopnea, no chest pain, no syncope, no vomiting, no abdominal pain, no rash, no leg pain, no leg swelling and no claudication. She has tried nothing for the symptoms. The treatment provided no relief. Associated medical issues do not include asthma, COPD, pneumonia, chronic lung disease, PE, CAD, heart failure or past MI.         Past Medical History:   Diagnosis Date    ADHD (attention deficit hyperactivity disorder)     Anemia     Anxiety     [facility]     Depression 4/3/2010    Genital herpes     Glucose intolerance (impaired glucose tolerance)     Herpes simplex virus (HSV) infection     IBD (inflammatory bowel disease)     Insomnia 4/3/2010    Liver disease        Past Surgical History:   Procedure Laterality Date    HX  SECTION      MN BREAST SURGERY PROCEDURE UNLISTED               Family History:   Adopted: Yes       Social History     Socioeconomic History    Marital status:      Spouse name: Neelam Blank Number of children: 1    Years of education: Not on file    Highest education level: Not on file   Occupational History    Occupation: Hairdresser   Tobacco Use    Smoking status: Former Smoker     Packs/day: 0.30     Years: 3.00     Pack years: 0.90     Types: Cigarettes    Smokeless tobacco: Never Used   Substance and Sexual Activity    Alcohol use: No     Alcohol/week: 0.0 standard drinks    Drug use: No    Sexual activity: Yes     Partners: Male   Other Topics Concern     Service Not Asked    Blood Transfusions Not Asked    Caffeine Concern Not Asked    Occupational Exposure Not Asked    Hobby Hazards Not Asked    Sleep Concern Not Asked    Stress Concern Not Asked    Weight Concern Not Asked    Special Diet Not Asked    Back Care Not Asked    Exercise Not Asked    Bike Helmet Not Asked   [address] Not Asked    Self-Exams Not Asked   Social History Narrative    Not on file     Social Determinants of Health     Financial Resource Strain:     Difficulty of Paying Living Expenses: Not on file   Food Insecurity:     Worried About Running Out of Food in the Last Year: Not on file    Pili of Food in the Last Year: Not on file   Transportation Needs:     Lack of Transportation (Medical): Not on file    Lack of Transportation (Non-Medical): Not on file   Physical Activity:     Days of Exercise per Week: Not on file    Minutes of Exercise per Session: Not on file   Stress:     Feeling of Stress : Not on file   Social Connections:     Frequency of Communication with Friends and Family: Not on file    Frequency of Social Gatherings with Friends and Family: Not on file    Attends [employer] Services: Not on file    Active Member of [address] [place] [address] or Organizations: Not on file    Attends Club or Organization Meetings: Not on file    Marital Status: Not on file   Intimate Partner Violence:     Fear of Current or Ex-Partner: Not on file    Emotionally Abused: Not on file    Physically Abused: Not on file    Sexually Abused: Not on file   Housing Stability:     Unable to Pay for Housing in the Last Year: Not on file    Number of [place] in the Last Year: Not on file    Unstable Housing in the Last Year: Not on file         ALLERGIES: Cephalexin and Pcn [penicillins]    Review of Systems   Constitutional: Negative for activity change, chills and fever. HENT: Negative for ear pain, nosebleeds, rhinorrhea, sore throat, trouble swallowing and voice change. Eyes: Negative for visual disturbance.    Respiratory: Positive for shortness of breath. Negative for cough, hemoptysis, sputum production and wheezing. Cardiovascular: Positive for PND. Negative for chest pain, palpitations, orthopnea, claudication, leg swelling and syncope. Gastrointestinal: Negative for abdominal pain, constipation, diarrhea, nausea and vomiting. Genitourinary: Negative for difficulty urinating, dysuria, hematuria and urgency. Musculoskeletal: Negative for back pain, neck pain and neck stiffness. Skin: Negative for color change and rash. Allergic/Immunologic: Negative for immunocompromised state. Neurological: Negative for dizziness, seizures, syncope, weakness, light-headedness, numbness and headaches. Psychiatric/Behavioral: Negative for behavioral problems, confusion, hallucinations, self-injury and suicidal ideas. Vitals:    07/11/22 1152   BP: 118/64   Pulse: 68   Resp: 16   Temp: 98.6 °F (37 °C)   SpO2: 99%   Weight: 83.6 kg (184 lb 4.9 oz)   Height: 5' (1.524 m)            Physical Exam  Vitals and nursing note reviewed. Constitutional:       General: She is not in acute distress. Appearance: She is well-developed. She is not diaphoretic. HENT:      Head: Normocephalic and atraumatic. Eyes:      Pupils: Pupils are equal, round, and reactive to light. Cardiovascular:      Rate and Rhythm: Normal rate and regular rhythm. Heart sounds: Normal heart sounds. No murmur heard. No friction rub. No gallop. Pulmonary:      Effort: Pulmonary effort is normal. No respiratory distress. Breath sounds: Normal breath sounds. No wheezing. Abdominal:      General: Bowel sounds are normal. There is no distension. Palpations: Abdomen is soft. Tenderness: There is no abdominal tenderness. There is no guarding or rebound. Musculoskeletal:         General: Normal range of motion. Cervical back: Normal range of motion and neck supple. Skin:     General: Skin is warm. Findings: No rash.    Neurological:      Mental Status: She is alert and oriented to person, place, and time. Psychiatric:         Behavior: Behavior normal.         Thought Content: Thought content normal.         Judgment: Judgment normal.          MDM     This is a 25-year-old female with past medical history, review of systems, physical exam as above, presenting with complaints of dyspnea. Patient states she is postpartum day 5, G3, P3, stating that 2 nights ago she started develop shortness of breath when laying flat. Patient also endorses some exertional dyspnea. She denies chest pain, denies pedal edema, calf pain, fevers, cough. She denies a history of pulmonary or cardiovascular disease. Physical exam is remarkable for well-appearing young female, in no acute distress noted to be normotensive, afebrile without tachycardia, satting 99% on room air. Differential includes new onset heart failure, ACS, PE, anxiety. Plan to obtain CMP, CBC, EKG, cardiac enzymes, BNP, CTA of the chest.  We will reassess, and make a disposition. Procedures    1:57 PM  Patient's informed the radiology tech that she has hives following her last contrast administration. We will premedicate for study. SIGN OUT:  3:00 PM  Discussed pt's hx, disposition, and available diagnostic and imaging results with Dr. Efren Li. Reviewed care plans. Both providers and patient are in agreement with care plan. Dr. Vikram Redd is transferring care of the pt to Dr. Efren Li at this time.

## 2022-07-11 NOTE — ED TRIAGE NOTES
Pt reports she is five days post  and the past few nights when she goes to lay down she has SOB. States \"when I lay down it is like something is laying on my chest\".

## 2022-07-26 ENCOUNTER — TELEPHONE (OUTPATIENT)
Dept: INTERNAL MEDICINE CLINIC | Age: 32
End: 2022-07-26

## 2022-07-26 NOTE — TELEPHONE ENCOUNTER
Patient is scheduled for virtual visit on 8/2/22 11:15 AM. She would like to be switched to in person if possible. Patient was in the ER for SOB, Insomnia, 6 days PP, and needs med refill.

## 2022-07-27 NOTE — TELEPHONE ENCOUNTER
Left detailed message for patient advising that appointment has been changed to in office appointment per patient request

## 2022-08-02 ENCOUNTER — OFFICE VISIT (OUTPATIENT)
Dept: INTERNAL MEDICINE CLINIC | Age: 32
End: 2022-08-02
Payer: COMMERCIAL

## 2022-08-02 VITALS
WEIGHT: 177.8 LBS | HEIGHT: 60 IN | BODY MASS INDEX: 34.91 KG/M2 | OXYGEN SATURATION: 98 % | SYSTOLIC BLOOD PRESSURE: 108 MMHG | HEART RATE: 82 BPM | TEMPERATURE: 98.3 F | DIASTOLIC BLOOD PRESSURE: 63 MMHG | RESPIRATION RATE: 18 BRPM

## 2022-08-02 DIAGNOSIS — K50.90 CROHN'S DISEASE WITHOUT COMPLICATION, UNSPECIFIED GASTROINTESTINAL TRACT LOCATION (HCC): ICD-10-CM

## 2022-08-02 DIAGNOSIS — L30.9 ECZEMA, UNSPECIFIED TYPE: Primary | ICD-10-CM

## 2022-08-02 DIAGNOSIS — R53.83 FATIGUE, UNSPECIFIED TYPE: ICD-10-CM

## 2022-08-02 DIAGNOSIS — R73.02 GLUCOSE INTOLERANCE (IMPAIRED GLUCOSE TOLERANCE): ICD-10-CM

## 2022-08-02 DIAGNOSIS — F41.9 ANXIETY: ICD-10-CM

## 2022-08-02 PROCEDURE — 99214 OFFICE O/P EST MOD 30 MIN: CPT | Performed by: PHYSICIAN ASSISTANT

## 2022-08-02 RX ORDER — MOMETASONE FUROATE 1 MG/G
OINTMENT TOPICAL DAILY
Qty: 15 G | Refills: 1 | Status: SHIPPED | OUTPATIENT
Start: 2022-08-02

## 2022-08-02 RX ORDER — LOPERAMIDE HYDROCHLORIDE 2 MG/1
CAPSULE ORAL
Qty: 120 CAPSULE | Refills: 1 | Status: SHIPPED | OUTPATIENT
Start: 2022-08-02 | End: 2022-11-03

## 2022-08-02 RX ORDER — ONDANSETRON 8 MG/1
TABLET, ORALLY DISINTEGRATING ORAL
COMMUNITY
Start: 2022-07-12

## 2022-08-02 RX ORDER — BUPROPION HYDROCHLORIDE 150 MG/1
150 TABLET ORAL
Qty: 90 TABLET | Refills: 1 | Status: SHIPPED | OUTPATIENT
Start: 2022-08-02 | End: 2022-09-06

## 2022-08-02 NOTE — PROGRESS NOTES
Tj Cox is a 27 y.o. female  Chief Complaint   Patient presents with    Medication Evaluation     Visit Vitals  /63 (BP 1 Location: Left arm, BP Patient Position: Sitting, BP Cuff Size: Adult)   Pulse 82   Temp 98.3 °F (36.8 °C) (Temporal)   Resp 18   Ht 5' (1.524 m)   Wt 177 lb 12.8 oz (80.6 kg)   SpO2 98%   BMI 34.72 kg/m²      Health Maintenance Due   Topic Date Due    Depression Monitoring  Never done    COVID-19 Vaccine (3 - Booster for Pfizer series) 12/11/2021       HPI  C section July 6th. Formula now. Baby is eating well. Pt would like to stop lactating. Taking Sudafed BID. Wants me to check incision line. Would to restart eczema cream     Feeling irritable regularly. Would like to restart antidepressant. Needing Immodium 3-4 times per week 2 tabs per day. Would like Rx refilled. ROS  Review of Systems   Constitutional:  Negative for fever and malaise/fatigue. Respiratory:  Negative for shortness of breath. Cardiovascular:  Negative for chest pain and palpitations. Neurological:  Negative for dizziness, loss of consciousness and weakness. Psychiatric/Behavioral:  Positive for depression. Negative for substance abuse and suicidal ideas. The patient is nervous/anxious. EXAM  Physical Exam  Vitals and nursing note reviewed. Constitutional:       General: She is not in acute distress. Appearance: She is well-developed. HENT:      Head: Normocephalic and atraumatic. Neck:      Vascular: No JVD. Cardiovascular:      Rate and Rhythm: Normal rate and regular rhythm. Heart sounds: Normal heart sounds. Pulmonary:      Effort: Pulmonary effort is normal. No respiratory distress. Breath sounds: Normal breath sounds. Musculoskeletal:      Cervical back: Neck supple. Skin:     General: Skin is warm and dry. Neurological:      Mental Status: She is alert and oriented to person, place, and time.    Psychiatric:         Mood and Affect: Mood normal. Behavior: Behavior normal.         Thought Content: Thought content normal.         Judgment: Judgment normal.     ASSESSMENT/PLAN  Encounter Diagnoses     ICD-10-CM ICD-9-CM   1. Eczema, unspecified type  L30.9 692.9   2. Anxiety  F41.9 300.00   3. Fatigue, unspecified type  R53.83 780.79   4. Crohn's disease without complication, unspecified gastrointestinal tract location ([facility]  K50.90 555.9   5. Glucose intolerance (impaired glucose tolerance)  R73.02 790.22     Orders Placed This Encounter    TSH 3RD GENERATION    CBC W/O DIFF    IRON PROFILE    HEMOGLOBIN A1C WITH EAG    Restart buPROPion XL (WELLBUTRIN XL) 150 mg tablet    loperamide (IMODIUM) 2 mg capsule    mometasone (ELOCON) 0.1 % ointment   Refills as above.

## 2022-08-02 NOTE — PROGRESS NOTES
Reviewed record in preparation for visit and have obtained necessary documentation. Identified pt with two pt identifiers(name and ). Chief Complaint   Patient presents with    Medication Evaluation     Blood pressure 108/63, pulse 82, temperature 98.3 °F (36.8 °C), temperature source Temporal, resp. rate 18, height 5' (1.524 m), weight 177 lb 12.8 oz (80.6 kg), last menstrual period 10/06/2021, SpO2 98 %, unknown if currently breastfeeding. Health Maintenance Due   Topic Date Due    Depression Monitoring  Never done    COVID-19 Vaccine (3 - Booster for Pfizer series) 2021       Ms. Dayday Garza has a reminder for a \"due or due soon\" health maintenance. I have asked that she discuss this further with her primary care provider for follow-up on this health maintenance. Coordination of Care Questionnaire:  :     1) Have you been to an emergency room, urgent care clinic since your last visit? yes [facility] for delivery   Hospitalized since your last visit? yes             2) Have you seen or consulted any other health care providers outside of [address] [place] [address] since your last visit? no  (Include any pap smears or colon screenings in this section.)    3) In the event something were to happen to you and you were unable to speak on your behalf, do you have an Advance Directive/ Living Will in place stating your wishes?  NO

## 2022-08-03 PROBLEM — R73.03 PREDIABETES: Status: ACTIVE | Noted: 2022-08-03

## 2022-08-03 PROBLEM — Z34.90 SUPERVISION OF NORMAL PREGNANCY: Status: RESOLVED | Noted: 2021-11-30 | Resolved: 2022-08-03

## 2022-08-03 LAB
ERYTHROCYTE [DISTWIDTH] IN BLOOD BY AUTOMATED COUNT: 15.9 % (ref 11.5–14.5)
EST. AVERAGE GLUCOSE BLD GHB EST-MCNC: 120 MG/DL
HBA1C MFR BLD: 5.8 % (ref 4–5.6)
HCT VFR BLD AUTO: 38.8 % (ref 35–47)
HGB BLD-MCNC: 12.2 G/DL (ref 11.5–16)
IRON SATN MFR SERPL: 21 % (ref 20–50)
IRON SERPL-MCNC: 78 UG/DL (ref 35–150)
MCH RBC QN AUTO: 25.6 PG (ref 26–34)
MCHC RBC AUTO-ENTMCNC: 31.4 G/DL (ref 30–36.5)
MCV RBC AUTO: 81.3 FL (ref 80–99)
NRBC # BLD: 0 K/UL (ref 0–0.01)
NRBC BLD-RTO: 0 PER 100 WBC
PLATELET # BLD AUTO: 290 K/UL (ref 150–400)
PMV BLD AUTO: 10.3 FL (ref 8.9–12.9)
RBC # BLD AUTO: 4.77 M/UL (ref 3.8–5.2)
TIBC SERPL-MCNC: 366 UG/DL (ref 250–450)
TSH SERPL DL<=0.05 MIU/L-ACNC: 0.55 UIU/ML (ref 0.36–3.74)
WBC # BLD AUTO: 7.2 K/UL (ref 3.6–11)

## 2022-08-03 NOTE — PROGRESS NOTES
Blood Count, thyroid, liver enzymes, kidney function, iron levels, and electrolytes are all normal/acceptable. Good! Your A1c shows that your glucose (sugar) is improving but remains in the \"prediabetic\" range. Decrease carbohydrates (bread, potatoes, rice, pasta/noodles, cereal, sugar, sweets), avoid liquid calories (non-diet soda, gatorade, sweet tea, juice, alcohol), and exercise at least a little bit every day.

## 2022-08-16 NOTE — PROGRESS NOTES
Postpartum evaluation    Garrett Kurtz is a 27 y.o. female who presents for a postpartum exam.     She is now six weeks post repeat  section. Her baby is doing well. She has had no menses since delivery. She has had the following significant problems since her delivery: none    The patient is bottle feeding without difficulty. The patient would like to use  OCP for birth control. She is currently taking: no medications. She is due for her next AE in 3 months.      Visit Vitals  BP (!) 101/53   Wt 179 lb 6.4 oz (81.4 kg)   LMP 10/06/2021 (Exact Date)   BMI 35.04 kg/m²       PHYSICAL EXAMINATION    Constitutional  Appearance: well-nourished, well developed, alert, in no acute distress    HENT  Head and Face: appears normal    Neck  Inspection/Palpation: normal appearance, no masses or tenderness  Lymph Nodes: no lymphadenopathy present  Thyroid: gland size normal, nontender, no nodules or masses present on palpation    Breasts  Inspection of Breasts: breasts symmetrical, no skin changes, no discharge present, nipple appearance normal, no skin retraction present  Palpation of Breasts and Axillae: no masses present on palpation, no breast tenderness  Axillary Lymph Nodes: no lymphadenopathy present    Gastrointestinal  Abdominal Examination: abdomen non-tender to palpation, normal bowel sounds, no masses present  Liver and spleen: no hepatomegaly present, spleen not palpable  Hernias: no hernias identified    Genitourinary  External Genitalia: normal appearance for age, no discharge present, no tenderness present, no inflammatory lesions present, no masses present, no atrophy present  Vagina: normal vaginal vault without central or paravaginal defects, no discharge present, no inflammatory lesions present, no masses present  Bladder: non-tender to palpation  Urethra: appears normal  Cervix: normal   Uterus: normal size, shape and consistency  Adnexa: no adnexal tenderness present, no adnexal masses present  Perineum: perineum within normal limits, no evidence of trauma, no rashes or skin lesions present  Anus: anus within normal limits, no hemorrhoids present  Inguinal Lymph Nodes: no lymphadenopathy present    Skin  General Inspection: no rash, no lesions identified    Neurologic/Psychiatric  Mental Status:  Orientation: grossly oriented to person, place and time  Mood and Affect: mood normal, affect appropriate    Assessment:  Normal postpartum check    Plan:  RTO for AE.   Start OCP

## 2022-08-17 ENCOUNTER — OFFICE VISIT (OUTPATIENT)
Dept: OBGYN CLINIC | Age: 32
End: 2022-08-17
Payer: COMMERCIAL

## 2022-08-17 VITALS — WEIGHT: 179.4 LBS | BODY MASS INDEX: 35.04 KG/M2 | SYSTOLIC BLOOD PRESSURE: 101 MMHG | DIASTOLIC BLOOD PRESSURE: 53 MMHG

## 2022-08-17 PROCEDURE — 0503F POSTPARTUM CARE VISIT: CPT | Performed by: OBSTETRICS & GYNECOLOGY

## 2022-08-17 RX ORDER — NORETHINDRONE ACETATE AND ETHINYL ESTRADIOL 1MG-20(24)
1 KIT ORAL DAILY
Qty: 3 DOSE PACK | Refills: 1 | Status: SHIPPED | OUTPATIENT
Start: 2022-08-17

## 2022-09-02 ENCOUNTER — PATIENT MESSAGE (OUTPATIENT)
Dept: INTERNAL MEDICINE CLINIC | Age: 32
End: 2022-09-02

## 2022-09-06 ENCOUNTER — VIRTUAL VISIT (OUTPATIENT)
Dept: INTERNAL MEDICINE CLINIC | Age: 32
End: 2022-09-06
Payer: COMMERCIAL

## 2022-09-06 DIAGNOSIS — J01.90 ACUTE NON-RECURRENT SINUSITIS, UNSPECIFIED LOCATION: Primary | ICD-10-CM

## 2022-09-06 DIAGNOSIS — F41.9 ANXIETY: ICD-10-CM

## 2022-09-06 DIAGNOSIS — R53.83 FATIGUE, UNSPECIFIED TYPE: ICD-10-CM

## 2022-09-06 PROCEDURE — 99214 OFFICE O/P EST MOD 30 MIN: CPT | Performed by: PHYSICIAN ASSISTANT

## 2022-09-06 RX ORDER — BUPROPION HYDROCHLORIDE 300 MG/1
300 TABLET ORAL
Qty: 90 TABLET | Refills: 1 | Status: SHIPPED | OUTPATIENT
Start: 2022-09-06

## 2022-09-06 RX ORDER — AZITHROMYCIN 250 MG/1
TABLET, FILM COATED ORAL
Qty: 6 TABLET | Refills: 0 | Status: SHIPPED | OUTPATIENT
Start: 2022-09-06 | End: 2022-09-11

## 2022-09-06 NOTE — PROGRESS NOTES
Lewis Rodriguez is a 27 y.o. female who was seen by synchronous (real-time) audio-video technology on 9/6/2022    Consent: Lewis Rodriguez, who was seen by synchronous (real-time) audio-video technology, and/or her healthcare decision maker, is aware that this patient-initiated, Telehealth encounter on 9/6/2022 is a billable service, with coverage as determined by her insurance carrier. She is aware that she may receive a bill and has provided verbal consent to proceed: YES  Subjective:   Lewis Rodriguez is a 27 y.o. female who was seen for Medication Refill (Patient states she has been sick over the last week )    Sick x 8 days. Covid tests negative. Entire family has been sick - started with daughter who is in first year of school. She has recovered well. Pt's sinus congestion persists. Cough is slowly improving. Wellbutrin 150 mg has helped somewhat, but has lots of room for improvement. No longer lactating. Pleased about this. Review of Systems   Constitutional:  Positive for malaise/fatigue. Negative for fever. HENT:  Positive for congestion. Negative for ear pain. Respiratory:  Positive for cough and sputum production. Negative for shortness of breath. Cardiovascular:  Negative for chest pain and palpitations. Neurological:  Positive for headaches. Negative for dizziness, loss of consciousness and weakness. Endo/Heme/Allergies:  Negative for environmental allergies.     Objective:     Patient-Reported Vitals 9/6/2022   Patient-Reported Temperature 97.3      General: alert, cooperative, no distress   Mental  status: normal mood, behavior, speech, dress, motor activity, and thought processes, able to follow commands   HENT: Nasal congestion   Neck: no visualized mass   Resp: no respiratory distress   Neuro: no gross deficits   Skin: no discoloration or lesions of concern on visible areas   Psychiatric: normal affect, consistent with stated mood, no evidence of hallucinations     Assessment & Plan:     Encounter Diagnoses     ICD-10-CM ICD-9-CM   1. Acute non-recurrent sinusitis, unspecified location  J01.90 461.9   2. Anxiety  F41.9 300.00   3. Fatigue, unspecified type  R53.83 780.79     Orders Placed This Encounter    Increase dose to: buPROPion XL (WELLBUTRIN XL) 300 mg XL tablet    azithromycin (ZITHROMAX) 250 mg tablet       We discussed the expected course, resolution and complications of the diagnosis(es) in detail. Medication risks, benefits, costs, interactions, and alternatives were discussed as indicated. I advised her to contact the office if her condition worsens, changes or fails to improve as anticipated. She expressed understanding with the diagnosis(es) and plan. Rebekah Hurtado is a 27 y.o. female who was evaluated by a video visit encounter for concerns as above. Patient identification was verified prior to start of the visit. A caregiver was present when appropriate. Due to this being a TeleHealth encounter (During [facility]-83 public health emergency), evaluation of the following organ systems was limited: Vitals/Constitutional/EENT/Resp/CV/GI//MS/Neuro/Skin/Heme-Lymph-Imm. Pursuant to the emergency declaration under the [facility], 1135 waiver authority and the [employer] and [employer] Act, this Virtual  Visit was conducted, with patient's (and/or legal guardian's) consent, to reduce the patient's risk of exposure to COVID-19 and provide necessary medical care. Services were provided through a video synchronous discussion virtually to substitute for in-person clinic visit. Patient and provider were located at their individual homes.       Olivia Galloway PA-C

## 2022-09-06 NOTE — PROGRESS NOTES
Reviewed record in preparation for visit and have obtained necessary documentation. Identified pt with two pt identifiers(name and ). No chief complaint on file. not currently breastfeeding. Health Maintenance Due   Topic Date Due    COVID-19 Vaccine (3 - Booster for Pfizer series) 2021    Yearly Flu Vaccine (1) 2022       Ms. Zunilda Yu has a reminder for a \"due or due soon\" health maintenance. I have asked that she discuss this further with her primary care provider for follow-up on this health maintenance. Coordination of Care Questionnaire:  :     1) Have you been to an emergency room, urgent care clinic since your last visit? no   Hospitalized since your last visit? no             2) Have you seen or consulted any other health care providers outside of [address] [place] [address] since your last visit? no      3) In the event something were to happen to you and you were unable to speak on your behalf, do you have an Advance Directive/ Living Will in place stating your wishes?  NO     Please send link to   [phone]

## 2022-09-06 NOTE — TELEPHONE ENCOUNTER
From: Tj Cox  To: Art Martinez PA-C  Sent: 9/2/2022 1:40 PM EDT  Subject: Sick with a cold    Eula Brown our whole house is sick currently including our 1 month old. My daughter started school last week and brought something back. We are all negative for COVID. Everyone is slowly getting better however it feels like I am getting worse. Im starting to cough up large chunks of mucus and my chest is hurting a bit when I cough along with my throat. Is there anything I should do? Is there a chance this is bronchitis? If so is it contagious?

## 2022-09-15 NOTE — ED NOTES
3 PM  Change of shift. Care of patient taken over from Dr. Barbara Toledo; H&P reviewed, bedside handoff complete. Awaiting CTA    7:40  The patient's results have been reviewed with them and/or available family. Patient and/or family verbally conveyed their understanding and agreement of the patient's signs, symptoms, diagnosis, treatment and prognosis and additionally agree to follow up as recommended in the discharge instructions or to return to the Emergency Room should their condition change prior to their follow-up appointment. The patient/family verbally agrees with the care-plan and verbally conveys that all of their questions have been answered. The discharge instructions have also been provided to the patient and/or family with some educational information regarding the patient's diagnosis as well a list of reasons why the patient would want to return to the ER prior to their follow-up appointment, should their condition change. MEDICATIONS GIVEN:  Medications   iopamidoL (ISOVUE-370) 76 % injection 100 mL (100 mL IntraVENous Given 7/11/22 1819)   methylPREDNISolone (PF) (SOLU-MEDROL) injection 40 mg (40 mg IntraVENous Given 7/11/22 1421)   diphenhydrAMINE (BENADRYL) injection 50 mg (50 mg IntraVENous Given 7/11/22 1422)       IMPRESSION:  1.  Dyspnea, unspecified type

## 2022-10-17 ENCOUNTER — OFFICE VISIT (OUTPATIENT)
Dept: INTERNAL MEDICINE CLINIC | Age: 32
End: 2022-10-17
Payer: COMMERCIAL

## 2022-10-17 VITALS
HEIGHT: 60 IN | DIASTOLIC BLOOD PRESSURE: 65 MMHG | SYSTOLIC BLOOD PRESSURE: 133 MMHG | RESPIRATION RATE: 18 BRPM | HEART RATE: 81 BPM | BODY MASS INDEX: 34.4 KG/M2 | WEIGHT: 175.2 LBS | OXYGEN SATURATION: 99 % | TEMPERATURE: 98.2 F

## 2022-10-17 DIAGNOSIS — F90.0 ATTENTION DEFICIT HYPERACTIVITY DISORDER (ADHD), PREDOMINANTLY INATTENTIVE TYPE: ICD-10-CM

## 2022-10-17 DIAGNOSIS — F41.9 ANXIETY: Primary | ICD-10-CM

## 2022-10-17 PROCEDURE — 99214 OFFICE O/P EST MOD 30 MIN: CPT | Performed by: PHYSICIAN ASSISTANT

## 2022-10-17 RX ORDER — DEXTROAMPHETAMINE SACCHARATE, AMPHETAMINE ASPARTATE, DEXTROAMPHETAMINE SULFATE AND AMPHETAMINE SULFATE 2.5; 2.5; 2.5; 2.5 MG/1; MG/1; MG/1; MG/1
10 TABLET ORAL 2 TIMES DAILY
Qty: 60 TABLET | Refills: 0 | Status: SHIPPED | OUTPATIENT
Start: 2022-10-17

## 2022-10-17 RX ORDER — BUDESONIDE 3 MG/1
CAPSULE, COATED PELLETS ORAL
COMMUNITY
Start: 2022-09-28

## 2022-10-17 NOTE — PROGRESS NOTES
Reviewed record in preparation for visit and have obtained necessary documentation. Identified pt with two pt identifiers(name and ). Chief Complaint   Patient presents with    Medication Evaluation     Patient states she is here for medication regarding anxiety      Blood pressure 133/65, pulse 81, temperature 98.2 °F (36.8 °C), temperature source Temporal, resp. rate 18, height 5' (1.524 m), weight 175 lb 3.2 oz (79.5 kg), SpO2 99 %, not currently breastfeeding. Health Maintenance Due   Topic Date Due    COVID-19 Vaccine (3 - Booster for Pfizer series) 2021    Yearly Flu Vaccine (1) 2022       Ms. Tommy Patel has a reminder for a \"due or due soon\" health maintenance. I have asked that she discuss this further with her primary care provider for follow-up on this health maintenance. Coordination of Care Questionnaire:  :     1) Have you been to an emergency room, urgent care clinic since your last visit? no   Hospitalized since your last visit? no             2) Have you seen or consulted any other health care providers outside of [address] [place] [address] since your last visit? no  (Include any pap smears or colon screenings in this section.)    3) In the event something were to happen to you and you were unable to speak on your behalf, do you have an Advance Directive/ Living Will in place stating your wishes?  NO

## 2022-10-17 NOTE — PROGRESS NOTES
Clotilde El is a 27 y.o. female  Chief Complaint   Patient presents with    Medication Evaluation     Patient states she is here for medication regarding anxiety      Visit Vitals  /65 (BP 1 Location: Left upper arm, BP Patient Position: Sitting, BP Cuff Size: Adult)   Pulse 81   Temp 98.2 °F (36.8 °C) (Temporal)   Resp 18   Ht 5' (1.524 m)   Wt 175 lb 3.2 oz (79.5 kg)   SpO2 99%   BMI 34.22 kg/m²      Health Maintenance Due   Topic Date Due    COVID-19 Vaccine (3 - Booster for Pfizer series) 12/11/2021    Flu Vaccine (1) 08/01/2022       HPI    Anxiety - taking Wellbutrin regularly   Took Zoloft  mg x several years starting in 2018  Took Paxil in 2015   Prozac in 2014  Doxepin 2013  Xanax hx in 2017    ADHD hx - off Adderall since 2018 to have babies. Now on OCP and children are ages 3 mo-2 yo - healthy. Not breastfeeding    Adult ADHD Self-Report Scale (ASRS-v1.1) Symptom Checklist  over the past 6 months. Scale of 1-5 (1=never 5 = very often)    1. How often do you have trouble wrapping up the final details of a project, once the challenging parts have been done? (positive: 3-5)  3   2. How often do you have difficulty getting things in order when you have to do a task that requires organization? (positive: 3-5)  4  3. How often do you have problems remembering appointments or obligations? (positive: 3-5)  2  4. When you have a task that requires a lot of thought, how often do you avoid or delay getting started? (positive 4-5)  2  5. How often do you fidget or squirm with your hands or feet when you have to sit down for a long time? (positive 4-5)  5  6. How often do you feel overly active and compelled to do things, like you were driven by a motor? (positive 4-5)  5  7. How often do you make careless mistakes when you have to work on a boring or difficult project? (positive 4-5)  4  8.  How often do you have difficulty keeping your attention when you are doing boring or repetitive work? (positive 4-5)  4  9. How often do you have difficulty concentrating on what people say to you, even when they are speaking to you directly? (positive: 3-5)  4  10. How often do you misplace or have difficulty finding things at home or at work? (positive 4-5)  2  11. How often are you distracted by activity or noise around you? (positive 4-5)  4  12. How often do you leave your seat in meetings or other situations in which you are expected to remain seated? (positive: 3-5)  2  13. How often do you feel restless or fidgety? (positive 4-5)  4  14. How often do you have difficulty unwinding and relaxing when you have time to yourself? (positive 4-5)  5  15. How often do you find yourself talking too much when you are in social situations? (positive 4-5)  2  16. When youre in a conversation, how often do you find yourself finishing the sentences of the people you are talking to, before they can finish them themselves? (positive: 3-5)  1  17. How often do you have difficulty waiting your turn in situations when turn taking is required? (positive 4-5)  5  18. How often do you interrupt others when they are busy? (positive: 3-5)  4  Total Positives: 12/18  Total Positives questions 1-6 (positive 4+): 4    Symptoms before Age 15? Yes  Settings where symptoms affect productivity? Home, Work, social situations, previously school    ROS  Review of Systems   Respiratory:  Negative for shortness of breath. Cardiovascular:  Negative for chest pain and palpitations. Neurological:  Negative for dizziness, loss of consciousness and weakness. Psychiatric/Behavioral:  Negative for depression and substance abuse. The patient is nervous/anxious. EXAM  Physical Exam  Vitals and nursing note reviewed. Constitutional:       General: She is not in acute distress. Appearance: She is well-developed. HENT:      Head: Normocephalic and atraumatic. Neck:      Vascular: No JVD.    Cardiovascular:      Rate and Rhythm: Normal rate and regular rhythm. Heart sounds: Normal heart sounds. Pulmonary:      Effort: Pulmonary effort is normal. No respiratory distress. Breath sounds: Normal breath sounds. Musculoskeletal:      Cervical back: Neck supple. Skin:     General: Skin is warm and dry. Neurological:      Mental Status: She is alert and oriented to person, place, and time. Psychiatric:         Mood and Affect: Mood normal.         Behavior: Behavior normal.         Thought Content: Thought content normal.         Judgment: Judgment normal.     ASSESSMENT/PLAN  Encounter Diagnoses     ICD-10-CM ICD-9-CM   1. Anxiety  F41.9 300.00   2. Attention deficit hyperactivity disorder (ADHD), predominantly inattentive type  F90.0 314.00     Continue Wellbutrin. Restart Adderall.

## 2022-10-24 ENCOUNTER — OFFICE VISIT (OUTPATIENT)
Dept: INTERNAL MEDICINE CLINIC | Age: 32
End: 2022-10-24
Payer: COMMERCIAL

## 2022-10-24 ENCOUNTER — PATIENT MESSAGE (OUTPATIENT)
Dept: INTERNAL MEDICINE CLINIC | Age: 32
End: 2022-10-24

## 2022-10-24 VITALS
OXYGEN SATURATION: 98 % | RESPIRATION RATE: 18 BRPM | TEMPERATURE: 98.2 F | DIASTOLIC BLOOD PRESSURE: 62 MMHG | BODY MASS INDEX: 34.32 KG/M2 | HEART RATE: 90 BPM | SYSTOLIC BLOOD PRESSURE: 122 MMHG | WEIGHT: 174.8 LBS | HEIGHT: 60 IN

## 2022-10-24 DIAGNOSIS — J06.9 VIRAL URI WITH COUGH: ICD-10-CM

## 2022-10-24 DIAGNOSIS — J30.9 ALLERGIC RHINITIS, UNSPECIFIED SEASONALITY, UNSPECIFIED TRIGGER: ICD-10-CM

## 2022-10-24 DIAGNOSIS — H61.23 BILATERAL IMPACTED CERUMEN: Primary | ICD-10-CM

## 2022-10-24 PROCEDURE — 69209 REMOVE IMPACTED EAR WAX UNI: CPT | Performed by: PHYSICIAN ASSISTANT

## 2022-10-24 PROCEDURE — 99213 OFFICE O/P EST LOW 20 MIN: CPT | Performed by: PHYSICIAN ASSISTANT

## 2022-10-24 NOTE — PROGRESS NOTES
Chief Complaint   Patient presents with    Ear Pain     Pt c/o of left ear pain x 2 days. 1. \"Have you been to the ER, urgent care clinic since your last visit? Hospitalized since your last visit? \" No    2. \"Have you seen or consulted any other health care providers outside of the [address] [place] [address] since your last visit? \" No     3. For patients aged 39-70: Has the patient had a colonoscopy / FIT/ Cologuard? NA - based on age      If the patient is female:    4. For patients aged 41-77: Has the patient had a mammogram within the past 2 years? NA - based on age or sex      11. For patients aged 21-65: Has the patient had a pap smear?  Yes - no Care Gap present    Visit Vitals  /62 (BP 1 Location: Left upper arm, BP Patient Position: Sitting)   Pulse 90   Temp 98.2 °F (36.8 °C) (Oral)   Resp 18   Ht 5' (1.524 m)   Wt 174 lb 12.8 oz (79.3 kg)   SpO2 98%   BMI 34.14 kg/m²

## 2022-10-24 NOTE — PROGRESS NOTES
Breana Freeman is a 27 y.o. female  Chief Complaint   Patient presents with    Ear Pain     Visit Vitals  /62 (BP 1 Location: Left upper arm, BP Patient Position: Sitting)   Pulse 90   Temp 98.2 °F (36.8 °C) (Oral)   Resp 18   Ht 5' (1.524 m)   Wt 174 lb 12.8 oz (79.3 kg)   SpO2 98%   BMI 34.14 kg/m²      Health Maintenance Due   Topic Date Due    COVID-19 Vaccine (3 - Booster for Pfizer series) 12/11/2021    Flu Vaccine (1) 08/01/2022     HPI  Ear pain x 2 days - getting worse. Feeling that she has the same cold that her children did last week: nasal congestion, productive cough, sinus headache. Covid test negative  Also has allergies. Taking Zyrtec and FLonase with good technique consistently. Taking Tylenol Cold, Mucinex. No fever. ROS  Review of Systems   Constitutional:  Positive for malaise/fatigue. Negative for fever. HENT:  Positive for congestion and ear pain. Respiratory:  Positive for cough and sputum production. Negative for shortness of breath. Cardiovascular:  Negative for chest pain and palpitations. Neurological:  Positive for headaches. Negative for dizziness, loss of consciousness and weakness. Endo/Heme/Allergies:  Negative for environmental allergies. EXAM  Physical Exam  Vitals and nursing note reviewed. Constitutional:       General: She is not in acute distress. Appearance: She is well-developed. HENT:      Head: Normocephalic and atraumatic. Comments: B TMs with fluid. No erythema. Right Ear: There is impacted cerumen. Left Ear: There is impacted cerumen. Ears:      Comments: Small hole in cerumen on L reveals no erythema on TM. Nose: Congestion and rhinorrhea present. Comments: Red, inflamed nasal mucosa. Mouth/Throat:      Pharynx: No oropharyngeal exudate. Eyes:      Conjunctiva/sclera: Conjunctivae normal.   Cardiovascular:      Rate and Rhythm: Normal rate and regular rhythm. Heart sounds: Normal heart sounds. Pulmonary:      Effort: Pulmonary effort is normal.      Breath sounds: Normal breath sounds. Musculoskeletal:      Cervical back: Neck supple. Lymphadenopathy:      Cervical: No cervical adenopathy. Skin:     General: Skin is warm and dry. Neurological:      Mental Status: She is alert and oriented to person, place, and time. ASSESSMENT/PLAN  Encounter Diagnoses     ICD-10-CM ICD-9-CM   1. Bilateral impacted cerumen  H61.23 380.4   2. Allergic rhinitis, unspecified seasonality, unspecified trigger  J30.9 477.9   3. Viral URI with cough  J06.9 465.9     Orders Placed This Encounter    REMOVAL IMPACTED CERUMEN IRRIGATION/LVG UNILAT    REMOVAL IMPACTED CERUMEN IRRIGATION/LVG UNILAT    REFERRAL TO ENT-OTOLARYNGOLOGY   Continue allergy meds. Add Sudafed and Debrox  If cough/nasal congestion persists through day #7-10, call for AB.    Lavage successful on R, only minor success on L and sx L ear persist.

## 2022-10-25 RX ORDER — AZITHROMYCIN 250 MG/1
TABLET, FILM COATED ORAL
Qty: 6 TABLET | Refills: 0 | Status: SHIPPED | OUTPATIENT
Start: 2022-10-25 | End: 2022-10-30

## 2022-10-25 NOTE — TELEPHONE ENCOUNTER
From: Kanwal Hess  To: Reji Rivera PA-C  Sent: 10/24/2022 5:09 AM EDT  Subject: Ear pain    I was just in there last week when I was sick from this cold that I got from my kids. My left ear started hurting yesterday and its gotten worse through the night. I wasnt sure if I needed to come in or if I was able to get a prescription sent over. Both my kids have ear infections Im assuming thats what this is. When I lay down it feels like fluid is in my ear and theres a lot of pressure in the ear.

## 2022-10-27 ENCOUNTER — PATIENT MESSAGE (OUTPATIENT)
Dept: INTERNAL MEDICINE CLINIC | Age: 32
End: 2022-10-27

## 2022-11-03 DIAGNOSIS — K50.90 CROHN'S DISEASE WITHOUT COMPLICATION, UNSPECIFIED GASTROINTESTINAL TRACT LOCATION (HCC): ICD-10-CM

## 2022-11-04 RX ORDER — LOPERAMIDE HYDROCHLORIDE 2 MG/1
CAPSULE ORAL
Qty: 360 CAPSULE | Refills: 3 | Status: SHIPPED | OUTPATIENT
Start: 2022-11-04

## 2022-11-22 ENCOUNTER — VIRTUAL VISIT (OUTPATIENT)
Dept: INTERNAL MEDICINE CLINIC | Age: 32
End: 2022-11-22
Payer: COMMERCIAL

## 2022-11-22 DIAGNOSIS — F90.0 ATTENTION DEFICIT HYPERACTIVITY DISORDER (ADHD), PREDOMINANTLY INATTENTIVE TYPE: ICD-10-CM

## 2022-11-22 PROCEDURE — 99213 OFFICE O/P EST LOW 20 MIN: CPT | Performed by: PHYSICIAN ASSISTANT

## 2022-11-22 RX ORDER — DEXTROAMPHETAMINE SACCHARATE, AMPHETAMINE ASPARTATE, DEXTROAMPHETAMINE SULFATE AND AMPHETAMINE SULFATE 5; 5; 5; 5 MG/1; MG/1; MG/1; MG/1
20 TABLET ORAL 2 TIMES DAILY
Qty: 60 TABLET | Refills: 0 | Status: SHIPPED | OUTPATIENT
Start: 2022-11-22

## 2022-11-22 RX ORDER — DEXTROAMPHETAMINE SACCHARATE, AMPHETAMINE ASPARTATE, DEXTROAMPHETAMINE SULFATE AND AMPHETAMINE SULFATE 5; 5; 5; 5 MG/1; MG/1; MG/1; MG/1
20 TABLET ORAL 2 TIMES DAILY
Qty: 60 TABLET | Refills: 0 | Status: SHIPPED | OUTPATIENT
Start: 2022-12-22 | End: 2022-11-30

## 2022-11-22 NOTE — PROGRESS NOTES
Reviewed record in preparation for visit and have obtained necessary documentation. Identified pt with two pt identifiers(name and ). No chief complaint on file. not currently breastfeeding. Health Maintenance Due   Topic Date Due    COVID-19 Vaccine (3 - Booster for Pfizer series) 2021    Yearly Flu Vaccine (1) 2022       Ms. Yessica Guzman has a reminder for a \"due or due soon\" health maintenance. I have asked that she discuss this further with her primary care provider for follow-up on this health maintenance. Coordination of Care Questionnaire:  :     1) Have you been to an emergency room, urgent care clinic since your last visit? no   Hospitalized since your last visit? no             2) Have you seen or consulted any other health care providers outside of [address] [place] [address] since your last visit? no        3) In the event something were to happen to you and you were unable to speak on your behalf, do you have an Advance Directive/ Living Will in place stating your wishes?  NO      Please send link to  [phone]

## 2022-11-22 NOTE — PROGRESS NOTES
Kanwal Hess is a 27 y.o. female who was seen by synchronous (real-time) audio-video technology on 11/22/2022    Consent: Kanwal Hess, who was seen by synchronous (real-time) audio-video technology, and/or her healthcare decision maker, is aware that this patient-initiated, Telehealth encounter on 11/22/2022 is a billable service, with coverage as determined by her insurance carrier. She is aware that she may receive a bill and has provided verbal consent to proceed: YES  Subjective:   Kanwal Hess is a 27 y.o. female who was seen for Medication Refill    ADD follow up - taking Adderall 10 mg BID regularly. Denies side effects including palpitations, anxiety, & insomnia. Feels more productive on medication. Feels that dose is not entirely effective. PDMP Review: Last PDMP [facility] SYSTEM as Reviewed:  Review User Review Instant Review Result   Melani Schultz AUTUMN 11/22/2022 11:51 AM Reviewed PDMP [1]     Review of Systems   Respiratory:  Negative for shortness of breath. Cardiovascular:  Negative for chest pain and palpitations. Neurological:  Negative for dizziness, seizures, loss of consciousness and weakness. Psychiatric/Behavioral:  Negative for depression. The patient is not nervous/anxious and does not have insomnia. Objective:     Patient-Reported Vitals 9/6/2022   Patient-Reported Temperature 97.3      General: alert, cooperative, no distress   Mental  status: normal mood, behavior, speech, dress, motor activity, and thought processes, able to follow commands   HENT: NCAT   Neck: no visualized mass   Resp: no respiratory distress   Neuro: no gross deficits   Skin: no discoloration or lesions of concern on visible areas   Psychiatric: normal affect, consistent with stated mood, no evidence of hallucinations     Assessment & Plan:     Encounter Diagnoses     ICD-10-CM ICD-9-CM   1.  Attention deficit hyperactivity disorder (ADHD), predominantly inattentive type  F90.0 314.00     Orders Placed This Encounter    Increase dose to 20 mg BID :   dextroamphetamine-amphetamine (ADDERALL) 20 mg tablet    dextroamphetamine-amphetamine (ADDERALL) 20 mg tablet       We discussed the expected course, resolution and complications of the diagnosis(es) in detail. Medication risks, benefits, costs, interactions, and alternatives were discussed as indicated. I advised her to contact the office if her condition worsens, changes or fails to improve as anticipated. She expressed understanding with the diagnosis(es) and plan. Sharmaine Bang is a 27 y.o. female who was evaluated by a video visit encounter for concerns as above. Patient identification was verified prior to start of the visit. A caregiver was present when appropriate. Due to this being a TeleHealth encounter (During [facility] public [facility] emergency), evaluation of the following organ systems was limited: Vitals/Constitutional/EENT/Resp/CV/GI//MS/Neuro/Skin/Heme-Lymph-Imm. Pursuant to the emergency declaration under the [facility], 1135 waiver authority and the [employer] Act, this Virtual  Visit was conducted, with patient's (and/or legal guardian's) consent, to reduce the patient's risk of exposure to COVID-19 and provide necessary medical care. Services were provided through a video synchronous discussion virtually to substitute for in-person clinic visit. Patient and provider were located at their individual homes.       Rj Marks PA-C

## 2022-11-29 NOTE — PROGRESS NOTES
Praveen Davies is a 27 y.o. female returns for an annual exam     No chief complaint on file. No LMP recorded. Her periods are normal in flow and irregular the last few months. She does not have dysmenorrhea. Problems: no significant problems  Birth Control: OCP (estrogen/progesterone). Last Pap: NILM, HPV negative 9/15/21. She does not have a history of MARK 2, 3 or cervical cancer. With regard to the Gardisil vaccine, she is older than the FDA approved age to receive it. 1. Have you been to the ER, urgent care clinic, or hospitalized since your last visit? No    2. Have you seen or consulted any other health care providers outside of the [address] [place] [address] since your last visit? No    Examination chaperoned by Elian Reich RN.

## 2022-11-30 ENCOUNTER — OFFICE VISIT (OUTPATIENT)
Dept: OBGYN CLINIC | Age: 32
End: 2022-11-30
Payer: COMMERCIAL

## 2022-11-30 VITALS
WEIGHT: 172.4 LBS | HEIGHT: 60 IN | DIASTOLIC BLOOD PRESSURE: 67 MMHG | SYSTOLIC BLOOD PRESSURE: 105 MMHG | BODY MASS INDEX: 33.85 KG/M2

## 2022-11-30 DIAGNOSIS — Z01.419 ENCOUNTER FOR GYNECOLOGICAL EXAMINATION (GENERAL) (ROUTINE) WITHOUT ABNORMAL FINDINGS: Primary | ICD-10-CM

## 2022-11-30 DIAGNOSIS — N91.2 AMENORRHEA: ICD-10-CM

## 2022-11-30 LAB
HCG URINE, QL. (POC): NEGATIVE
VALID INTERNAL CONTROL?: YES

## 2022-11-30 PROCEDURE — 81025 URINE PREGNANCY TEST: CPT | Performed by: OBSTETRICS & GYNECOLOGY

## 2022-11-30 PROCEDURE — 99395 PREV VISIT EST AGE 18-39: CPT | Performed by: OBSTETRICS & GYNECOLOGY

## 2022-11-30 RX ORDER — NORETHINDRONE ACETATE AND ETHINYL ESTRADIOL 1MG-20(24)
1 KIT ORAL DAILY
Qty: 3 DOSE PACK | Refills: 4 | Status: SHIPPED | OUTPATIENT
Start: 2022-11-30

## 2022-11-30 NOTE — PROGRESS NOTES
Rupinder Ribeiro is a ,  27 y.o. female / whose Patient's last menstrual period was 10/10/2022 (within weeks). was on 10/10/2022 who presents for her annual checkup. She is having no significant problems. Menstrual status:    Her periods are moderate in flow, irregular. No cycle on OCP    She denies dysmenorrhea. Contraception:    The current method of family planning is OCP (estrogen/progesterone)    Sexual history:    She  reports being sexually active and has had partner(s) who are male. Pap and Mammogram History:    Last Pap: NILM, HPV negative 9/15/21. She does not have a history of MARK 2, 3 or cervical cancer. Past Medical History:   Diagnosis Date    ADHD (attention deficit hyperactivity disorder)     Anemia     Anxiety     Bipolar 1 disorder ([facility] [address]     Depression 4/3/2010    Genital herpes     Glucose intolerance (impaired glucose tolerance)     Herpes simplex virus (HSV) infection     IBD (inflammatory bowel disease)     Insomnia 4/3/2010    Liver disease     Supervision of normal pregnancy 2021    Intrauterine pregnancy with the following problems identified:  [facility] 2022 D=US Covid vaccine booster due  Flu vaccine 2021 NIPTS- normal female Horizon- neg Hx of CS x 2 - repeat at term 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 Max Daily Amount: 40 mg. 60 Tablet 0    loperamide (IMODIUM) 2 mg capsule TAKE 1 CAPSULE BY MOUTH FOUR TIMES DAILY AS NEEDED FOR DIARRHEA 360 Capsule 3    buPROPion XL (WELLBUTRIN XL) 300 mg XL tablet Take 1 Tablet by mouth every morning.  80 Tablet 1    norethindrone-e estradiol-iron (Blisovi 24 Fe) 1 mg-20 mcg (24)/75 mg (4) tab Take 1 Tablet by mouth daily. 3 Dose Pack 1    ondansetron (ZOFRAN ODT) 8 mg disintegrating tablet       mometasone (ELOCON) 0.1 % ointment Apply  to affected area daily. 15 g 1    diphenhydrAMINE (BENADRYL) 25 mg capsule Take 25 mg by mouth every six (6) hours as needed for Sleep. [START ON 12/22/2022] dextroamphetamine-amphetamine (ADDERALL) 20 mg tablet Take 1 Tablet by mouth two (2) times a day. Max Daily Amount: 40 mg.  (Patient not taking: Reported on 11/30/2022) 60 Tablet 0    budesonide (ENTOCORT EC) 3 mg capsule  (Patient not taking: Reported on 11/30/2022)       Allergies: Cephalexin, Contrast agent [iodine], and Pcn [penicillins]   Social History     Socioeconomic History    Marital status:      Spouse name: Katie Avina    Number of children: 1    Years of education: Not on file    Highest education level: Not on file   Occupational History    Occupation: [employer]   Tobacco Use    Smoking status: Former     Packs/day: 0.30     Years: 3.00     Pack years: 0.90     Types: Cigarettes    Smokeless tobacco: Never   Substance and Sexual Activity    Alcohol use: No     Alcohol/week: 0.0 standard drinks    Drug use: No    Sexual activity: Yes     Partners: Male   Other Topics Concern     Service Not Asked    Blood Transfusions Not Asked    Caffeine Concern Not Asked    Occupational Exposure Not Asked    Hobby Hazards Not Asked    Sleep Concern Not Asked    Stress Concern Not Asked    Weight Concern Not Asked    Special Diet Not Asked    Back Care Not Asked    Exercise Not Asked    Bike Helmet Not Asked    Seat Belt Not Asked    Self-Exams Not Asked   Social History Narrative    Not on file     Social Determinants of Health     Financial Resource Strain: Not on file   Food Insecurity: Not on file   Transportation Needs: Not on file   Physical Activity: Not on file   Stress: Not on file   Social Connections: Not on file   Intimate Partner Violence: Not on file   Housing Stability: Not on file     Tobacco History:  reports that she has quit smoking. Her smoking use included cigarettes. She has a 0.90 pack-year smoking history. She has never used smokeless tobacco.  Alcohol Abuse:  reports no history of alcohol use. Drug Abuse:  reports no history of drug use. Patient Active Problem List   Diagnosis Code    Insomnia G47.00    Depression F32. A    ADHD (attention deficit hyperactivity disorder) F90.9    Anxiety F41.9    IBD (inflammatory bowel disease) K52.9    Glucose intolerance (impaired glucose tolerance) R73.02    Fatty liver K76.0    Prediabetes R73.03       Review of Systems - History obtained from the patient  Constitutional: negative for weight loss, fever, night sweats  HEENT: negative for hearing loss, earache, congestion, snoring, sorethroat  CV: negative for chest pain, palpitations, edema  Resp: negative for cough, shortness of breath, wheezing  GI: negative for change in bowel habits, abdominal pain, black or bloody stools  : negative for frequency, dysuria, hematuria, vaginal discharge  MSK: negative for back pain, joint pain, muscle pain  Breast: negative for breast lumps, nipple discharge, galactorrhea  Skin :negative for itching, rash, hives  Neuro: negative for dizziness, headache, confusion, weakness  Psych: negative for anxiety, depression, change in mood  Heme/lymph: negative for bleeding, bruising, pallor    Physical Exam    Visit Vitals  /67 (BP 1 Location: Left arm, BP Patient Position: Sitting)   Ht 5' (1.524 m)   Wt 172 lb 6.4 oz (78.2 kg)   LMP 10/10/2022 (Within Weeks)   Breastfeeding No   BMI 33.67 kg/m²       Constitutional  Appearance: well-nourished, well developed, alert, in no acute distress    HENT  Head and Face: appears normal    Neck  Inspection/Palpation: normal appearance, no masses or tenderness  Lymph Nodes: no lymphadenopathy present  Thyroid: gland size normal, nontender, no nodules or masses present on palpation    Chest  Respiratory Effort: breathing normal  Auscultation: normal breath sounds    Cardiovascular  Heart:   Auscultation: regular rate and rhythm without murmur    Breasts  Inspection of Breasts: breasts symmetrical, no skin changes, no discharge present, nipple appearance normal, no skin retraction present  Palpation of Breasts and Axillae: no masses present on palpation, no breast tenderness  Axillary Lymph Nodes: no lymphadenopathy present    Gastrointestinal  Abdominal Examination: abdomen non-tender to palpation, normal bowel sounds, no masses present  Liver and spleen: no hepatomegaly present, spleen not palpable  Hernias: no hernias identified    Genitourinary  External Genitalia: normal appearance for age, no discharge present, no tenderness present, no inflammatory lesions present, no masses present, no atrophy present  Vagina: normal vaginal vault without central or paravaginal defects, no discharge present, no inflammatory lesions present, no masses present  Bladder: non-tender to palpation  Urethra: appears normal  Cervix: normal   Uterus: normal size, shape and consistency  Adnexa: no adnexal tenderness present, no adnexal masses present  Perineum: perineum within normal limits, no evidence of trauma, no rashes or skin lesions present  Anus: anus within normal limits, no hemorrhoids present  Inguinal Lymph Nodes: no lymphadenopathy present    Skin  General Inspection: no rash, no lesions identified    Neurologic/Psychiatric  Mental Status:  Orientation: grossly oriented to person, place and time  Mood and Affect: mood normal, affect appropriate    Results for orders placed or performed in visit on 11/30/22   AMB POC URINE PREGNANCY TEST, VISUAL COLOR COMPARISON   Result Value Ref Range    VALID INTERNAL CONTROL POC Yes     HCG urine, Ql. (POC) Negative Negative       Assessment:  Routine gynecologic examination  Her current medical status is satisfactory with no evidence of significant gynecologic issues.     Plan:  Counseled re: diet, exercise, healthy lifestyle  Return for yearly wellness visits  Cont OCP

## 2022-12-06 DIAGNOSIS — R53.83 FATIGUE, UNSPECIFIED TYPE: ICD-10-CM

## 2022-12-06 DIAGNOSIS — F41.9 ANXIETY: ICD-10-CM

## 2022-12-06 RX ORDER — BUPROPION HYDROCHLORIDE 300 MG/1
300 TABLET ORAL
Qty: 90 TABLET | Refills: 1 | Status: SHIPPED | OUTPATIENT
Start: 2022-12-06

## 2023-01-16 ENCOUNTER — OFFICE VISIT (OUTPATIENT)
Dept: INTERNAL MEDICINE CLINIC | Age: 33
End: 2023-01-16
Payer: COMMERCIAL

## 2023-01-16 VITALS
SYSTOLIC BLOOD PRESSURE: 122 MMHG | RESPIRATION RATE: 14 BRPM | BODY MASS INDEX: 32.79 KG/M2 | DIASTOLIC BLOOD PRESSURE: 82 MMHG | TEMPERATURE: 98.3 F | HEART RATE: 75 BPM | HEIGHT: 60 IN | WEIGHT: 167 LBS | OXYGEN SATURATION: 98 %

## 2023-01-16 DIAGNOSIS — F41.9 ANXIETY: ICD-10-CM

## 2023-01-16 DIAGNOSIS — R73.02 GLUCOSE INTOLERANCE (IMPAIRED GLUCOSE TOLERANCE): ICD-10-CM

## 2023-01-16 DIAGNOSIS — F90.0 ATTENTION DEFICIT HYPERACTIVITY DISORDER (ADHD), PREDOMINANTLY INATTENTIVE TYPE: ICD-10-CM

## 2023-01-16 DIAGNOSIS — R73.03 PREDIABETES: ICD-10-CM

## 2023-01-16 DIAGNOSIS — F33.0 MILD EPISODE OF RECURRENT MAJOR DEPRESSIVE DISORDER (HCC): Primary | ICD-10-CM

## 2023-01-16 LAB
ALBUMIN SERPL-MCNC: 3.8 G/DL (ref 3.5–5)
ALBUMIN/GLOB SERPL: 1 (ref 1.1–2.2)
ALP SERPL-CCNC: 76 U/L (ref 45–117)
ALT SERPL-CCNC: 36 U/L (ref 12–78)
ANION GAP SERPL CALC-SCNC: 4 MMOL/L (ref 5–15)
AST SERPL-CCNC: 27 U/L (ref 15–37)
BILIRUB SERPL-MCNC: 0.3 MG/DL (ref 0.2–1)
BUN SERPL-MCNC: 10 MG/DL (ref 6–20)
BUN/CREAT SERPL: 13 (ref 12–20)
CALCIUM SERPL-MCNC: 9.1 MG/DL (ref 8.5–10.1)
CHLORIDE SERPL-SCNC: 105 MMOL/L (ref 97–108)
CO2 SERPL-SCNC: 28 MMOL/L (ref 21–32)
CREAT SERPL-MCNC: 0.75 MG/DL (ref 0.55–1.02)
EST. AVERAGE GLUCOSE BLD GHB EST-MCNC: 114 MG/DL
GLOBULIN SER CALC-MCNC: 4 G/DL (ref 2–4)
GLUCOSE SERPL-MCNC: 93 MG/DL (ref 65–100)
HBA1C MFR BLD: 5.6 % (ref 4–5.6)
POTASSIUM SERPL-SCNC: 4.4 MMOL/L (ref 3.5–5.1)
PROT SERPL-MCNC: 7.8 G/DL (ref 6.4–8.2)
SODIUM SERPL-SCNC: 137 MMOL/L (ref 136–145)

## 2023-01-16 PROCEDURE — 99214 OFFICE O/P EST MOD 30 MIN: CPT | Performed by: PHYSICIAN ASSISTANT

## 2023-01-16 RX ORDER — DEXTROAMPHETAMINE SACCHARATE, AMPHETAMINE ASPARTATE, DEXTROAMPHETAMINE SULFATE AND AMPHETAMINE SULFATE 5; 5; 5; 5 MG/1; MG/1; MG/1; MG/1
20 TABLET ORAL 2 TIMES DAILY
Qty: 60 TABLET | Refills: 0 | Status: SHIPPED | OUTPATIENT
Start: 2023-01-16

## 2023-01-16 RX ORDER — BUPROPION HYDROCHLORIDE 300 MG/1
300 TABLET ORAL
Qty: 90 TABLET | Refills: 1 | Status: SHIPPED | OUTPATIENT
Start: 2023-01-16

## 2023-01-16 RX ORDER — DEXTROAMPHETAMINE SACCHARATE, AMPHETAMINE ASPARTATE, DEXTROAMPHETAMINE SULFATE AND AMPHETAMINE SULFATE 5; 5; 5; 5 MG/1; MG/1; MG/1; MG/1
20 TABLET ORAL 2 TIMES DAILY
Qty: 60 TABLET | Refills: 0 | Status: SHIPPED | OUTPATIENT
Start: 2023-03-16

## 2023-01-16 RX ORDER — DEXTROAMPHETAMINE SACCHARATE, AMPHETAMINE ASPARTATE, DEXTROAMPHETAMINE SULFATE AND AMPHETAMINE SULFATE 5; 5; 5; 5 MG/1; MG/1; MG/1; MG/1
20 TABLET ORAL 2 TIMES DAILY
Qty: 60 TABLET | Refills: 0 | Status: SHIPPED | OUTPATIENT
Start: 2023-02-16

## 2023-01-16 RX ORDER — ESCITALOPRAM OXALATE 10 MG/1
10 TABLET ORAL DAILY
Qty: 90 TABLET | Refills: 1 | Status: SHIPPED | OUTPATIENT
Start: 2023-01-16

## 2023-01-16 NOTE — PROGRESS NOTES
Ramona Mckee is a 27 y.o. female  Chief Complaint   Patient presents with    Follow-up     Medication refill and check - pt would like to know if she can add or take something else for depression      Visit Vitals  Pulse 75   Temp 98.3 °F (36.8 °C) (Temporal)   Resp 14   Ht 5' (1.524 m)   Wt 167 lb (75.8 kg)   SpO2 98%   Breastfeeding No   BMI 32.61 kg/m²      Health Maintenance Due   Topic Date Due    COVID-19 Vaccine (3 - Booster for Pfizer series) 09/05/2021    Flu Vaccine (1) 08/01/2022     HPI  Depression follow up - taking Wellbutrin  mg regularly. Helping somewhat, but feels there is room for improvement. ADD follow up - taking Adderall regularly. Denies side effects including palpitations, anxiety, & insomnia. Feels more productive on medication. Feels that dose is effective. BP is controlled. BP Readings from Last 3 Encounters:   11/30/22 105/67   10/24/22 122/62   10/17/22 133/65       PDMP Review WNL:   Last PDMP Jelani as Reviewed:  Review User Review Instant Review Result   MARYA VIDAL 1/16/2023  1:03 PM Reviewed PDMP [1]     Wt Readings from Last 3 Encounters:   01/16/23 167 lb (75.8 kg)   11/30/22 172 lb 6.4 oz (78.2 kg)   10/24/22 174 lb 12.8 oz (79.3 kg)   Pleased with weight loss  122/82    ROS  Review of Systems   Respiratory:  Negative for shortness of breath. Cardiovascular:  Negative for chest pain and palpitations. Neurological:  Negative for dizziness, loss of consciousness and weakness. EXAM  Physical Exam  Vitals and nursing note reviewed. Constitutional:       General: She is not in acute distress. Appearance: She is well-developed. HENT:      Head: Normocephalic and atraumatic. Neck:      Vascular: No JVD. Cardiovascular:      Rate and Rhythm: Normal rate and regular rhythm. Heart sounds: Normal heart sounds. Pulmonary:      Effort: Pulmonary effort is normal. No respiratory distress. Breath sounds: Normal breath sounds. Musculoskeletal:      Cervical back: Neck supple. Skin:     General: Skin is warm and dry. Neurological:      Mental Status: She is alert and oriented to person, place, and time. Psychiatric:         Mood and Affect: Mood normal.         Behavior: Behavior normal.         Thought Content: Thought content normal.         Judgment: Judgment normal.     ASSESSMENT/PLAN  Encounter Diagnoses     ICD-10-CM ICD-9-CM   1. Mild episode of recurrent major depressive disorder (HCC)  F33.0 296.31   2. Attention deficit hyperactivity disorder (ADHD), predominantly inattentive type  F90.0 314.00   3. Anxiety  F41.9 300.00   4. Glucose intolerance (impaired glucose tolerance)  R73.02 790.22   5. Prediabetes  R73.03 790.29     Orders Placed This Encounter    METABOLIC PANEL, COMPREHENSIVE    HEMOGLOBIN A1C WITH EAG    buPROPion XL (WELLBUTRIN XL) 300 mg XL tablet    dextroamphetamine-amphetamine (ADDERALL) 20 mg tablet    dextroamphetamine-amphetamine (ADDERALL) 20 mg tablet    Add escitalopram oxalate (LEXAPRO) 10 mg tablet    dextroamphetamine-amphetamine (ADDERALL) 20 mg tablet     Refills sent. Congratulated pt on weight loss success and encouraged continued efforts!

## 2023-01-17 NOTE — PROGRESS NOTES
These are AMAZING results! You are now OUT of the prediabetic range, your liver enzymes are improved, kidney function and electrolytes are all normal/acceptable. Keep up the Good Work!

## 2023-02-10 DIAGNOSIS — F90.0 ATTENTION DEFICIT HYPERACTIVITY DISORDER (ADHD), PREDOMINANTLY INATTENTIVE TYPE: ICD-10-CM

## 2023-02-10 RX ORDER — DEXTROAMPHETAMINE SACCHARATE, AMPHETAMINE ASPARTATE, DEXTROAMPHETAMINE SULFATE AND AMPHETAMINE SULFATE 5; 5; 5; 5 MG/1; MG/1; MG/1; MG/1
20 TABLET ORAL 2 TIMES DAILY
Qty: 60 TABLET | Refills: 0 | Status: SHIPPED | OUTPATIENT
Start: 2023-02-10

## 2023-02-10 RX ORDER — DEXTROAMPHETAMINE SACCHARATE, AMPHETAMINE ASPARTATE, DEXTROAMPHETAMINE SULFATE AND AMPHETAMINE SULFATE 5; 5; 5; 5 MG/1; MG/1; MG/1; MG/1
20 TABLET ORAL 2 TIMES DAILY
Qty: 60 TABLET | Refills: 0 | Status: SHIPPED | OUTPATIENT
Start: 2023-03-10

## 2023-02-10 RX ORDER — DEXTROAMPHETAMINE SACCHARATE, AMPHETAMINE ASPARTATE, DEXTROAMPHETAMINE SULFATE AND AMPHETAMINE SULFATE 5; 5; 5; 5 MG/1; MG/1; MG/1; MG/1
20 TABLET ORAL 2 TIMES DAILY
Qty: 60 TABLET | Refills: 0 | Status: SHIPPED | OUTPATIENT
Start: 2023-04-10

## 2023-02-14 ENCOUNTER — TELEPHONE (OUTPATIENT)
Dept: INTERNAL MEDICINE CLINIC | Age: 33
End: 2023-02-14

## 2023-02-14 NOTE — TELEPHONE ENCOUNTER
ECC reported that pt is calling in asking about her previous [facility] message in regard to allowing her  Ronnell Christianson to  her prescription form. I informed caller that he is listed on her permission to release form, so he is able to.

## 2023-03-01 ENCOUNTER — VIRTUAL VISIT (OUTPATIENT)
Dept: INTERNAL MEDICINE CLINIC | Age: 33
End: 2023-03-01

## 2023-03-01 DIAGNOSIS — F33.0 MILD EPISODE OF RECURRENT MAJOR DEPRESSIVE DISORDER (HCC): Primary | ICD-10-CM

## 2023-03-01 DIAGNOSIS — F41.9 ANXIETY: ICD-10-CM

## 2023-03-01 PROCEDURE — 99213 OFFICE O/P EST LOW 20 MIN: CPT | Performed by: PHYSICIAN ASSISTANT

## 2023-03-01 RX ORDER — ESCITALOPRAM OXALATE 20 MG/1
20 TABLET ORAL DAILY
Qty: 90 TABLET | Refills: 1 | Status: SHIPPED | OUTPATIENT
Start: 2023-03-01

## 2023-03-01 NOTE — PROGRESS NOTES
Giovanna Rosales is a 27 y.o. female who was seen by synchronous (real-time) audio-video technology on 3/1/2023    Consent: Giovanna Rosales, who was seen by synchronous (real-time) audio-video technology, and/or her healthcare decision maker, is aware that this patient-initiated, Telehealth encounter on 3/1/2023 is a billable service, with coverage as determined by her insurance carrier. She is aware that she may receive a bill and has provided verbal consent to proceed: YES  Subjective:   Giovanna Rosales is a 27 y.o. female who was seen for Follow-up (Med eval )    Added Lexapro 10 mg to Wellbutrin  mg at last visit 1/16. Notes some improvement in anxiety/depression, but notes room for improvement. No s/e. Review of Systems   Respiratory:  Negative for shortness of breath. Cardiovascular:  Negative for chest pain and palpitations. Neurological:  Negative for dizziness, loss of consciousness and weakness. Psychiatric/Behavioral:  Positive for depression. The patient is nervous/anxious. Objective:     Patient-Reported Vitals 3/1/2023   Patient-Reported Weight 160lb   Patient-Reported Temperature -      General: alert, cooperative, no distress   Mental  status: normal mood, behavior, speech, dress, motor activity, and thought processes, able to follow commands   HENT: NCAT   Neck: no visualized mass   Resp: no respiratory distress   Neuro: no gross deficits   Skin: no discoloration or lesions of concern on visible areas   Psychiatric: normal affect, consistent with stated mood, no evidence of hallucinations     Assessment & Plan:     Encounter Diagnoses     ICD-10-CM ICD-9-CM   1. Mild episode of recurrent major depressive disorder (HCC)  F33.0 296.31   2. Anxiety  F41.9 300.00     Orders Placed This Encounter    Increase dose to: escitalopram oxalate (LEXAPRO) 20 mg tablet       We discussed the expected course, resolution and complications of the diagnosis(es) in detail.   Medication risks, benefits, costs, interactions, and alternatives were discussed as indicated. I advised her to contact the office if her condition worsens, changes or fails to improve as anticipated. She expressed understanding with the diagnosis(es) and plan. Toy Mcnulty is a 27 y.o. female who was evaluated by a video visit encounter for concerns as above. Patient identification was verified prior to start of the visit. A caregiver was present when appropriate. Due to this being a TeleHealth encounter (During AFJDB-84 public health emergency), evaluation of the following organ systems was limited: Vitals/Constitutional/EENT/Resp/CV/GI//MS/Neuro/Skin/Heme-Lymph-Imm. Pursuant to the emergency declaration under the [facility], 1135 waiver authority and the [employer] Act, this Virtual  Visit was conducted, with patient's (and/or legal guardian's) consent, to reduce the patient's risk of exposure to COVID-19 and provide necessary medical care. Services were provided through a video synchronous discussion virtually to substitute for in-person clinic visit. Patient and provider were located at their individual homes.       Adel Skiff, PA-C

## 2023-03-20 ENCOUNTER — OFFICE VISIT (OUTPATIENT)
Dept: INTERNAL MEDICINE CLINIC | Age: 33
End: 2023-03-20
Payer: COMMERCIAL

## 2023-03-20 VITALS
OXYGEN SATURATION: 99 % | HEIGHT: 60 IN | WEIGHT: 158 LBS | SYSTOLIC BLOOD PRESSURE: 115 MMHG | BODY MASS INDEX: 31.02 KG/M2 | HEART RATE: 82 BPM | TEMPERATURE: 98.2 F | RESPIRATION RATE: 14 BRPM | DIASTOLIC BLOOD PRESSURE: 68 MMHG

## 2023-03-20 DIAGNOSIS — J01.90 ACUTE NON-RECURRENT SINUSITIS, UNSPECIFIED LOCATION: Primary | ICD-10-CM

## 2023-03-20 PROCEDURE — 99213 OFFICE O/P EST LOW 20 MIN: CPT | Performed by: PHYSICIAN ASSISTANT

## 2023-03-20 RX ORDER — DOXYCYCLINE 100 MG/1
100 CAPSULE ORAL 2 TIMES DAILY
Qty: 20 CAPSULE | Refills: 0 | Status: SHIPPED | OUTPATIENT
Start: 2023-03-20 | End: 2023-03-30

## 2023-03-20 RX ORDER — CETIRIZINE HYDROCHLORIDE 10 MG/1
TABLET ORAL
COMMUNITY

## 2023-03-20 NOTE — PROGRESS NOTES
Doyle Brand is a 27 y.o. female  Chief Complaint   Patient presents with    Cold Symptoms     Started three weeks - cough, runny nose, clogged ears, headaches, sinus pressure, no fever      Visit Vitals  /68 (BP 1 Location: Left upper arm, BP Patient Position: Sitting, BP Cuff Size: Adult)   Pulse 82   Temp 98.2 °F (36.8 °C) (Temporal)   Resp 14   Ht 5' (1.524 m)   Wt 158 lb (71.7 kg)   SpO2 99%   BMI 30.86 kg/m²      Health Maintenance Due   Topic Date Due    COVID-19 Vaccine (3 - Booster for Pfizer series) 09/05/2021    Flu Vaccine (1) 08/01/2022       HPI  Sick x 3 weeks. Productive cough, ear pressure, nasal congestion. No SOB, ear pain, or high fever. Has Tried OTC cold meds and allergy meds without improvement. Covid test negative at home. ROS  Review of Systems   Constitutional:  Positive for malaise/fatigue. Negative for fever. HENT:  Positive for congestion, ear pain and sinus pain. Respiratory:  Positive for cough and sputum production. Negative for shortness of breath. Neurological:  Positive for headaches. Endo/Heme/Allergies:  Negative for environmental allergies. EXAM  Physical Exam  Vitals and nursing note reviewed. Constitutional:       General: She is not in acute distress. Appearance: She is well-developed. HENT:      Head: Normocephalic and atraumatic. Comments: B TMs with fluid. No erythema. Nose: Congestion and rhinorrhea present. Comments: Red, inflamed nasal mucosa. Mouth/Throat:      Pharynx: No oropharyngeal exudate. Eyes:      Conjunctiva/sclera: Conjunctivae normal.   Cardiovascular:      Rate and Rhythm: Normal rate and regular rhythm. Heart sounds: Normal heart sounds. Pulmonary:      Effort: Pulmonary effort is normal.      Breath sounds: Normal breath sounds. Musculoskeletal:      Cervical back: Neck supple. Lymphadenopathy:      Cervical: No cervical adenopathy. Skin:     General: Skin is warm and dry. Neurological:      Mental Status: She is alert and oriented to person, place, and time. ASSESSMENT/PLAN  Encounter Diagnoses     ICD-10-CM ICD-9-CM   1. Acute non-recurrent sinusitis, unspecified location  J01.90 461.9     Orders Placed This Encounter    cetirizine (ZYRTEC) 10 mg tablet    doxycycline (MONODOX) 100 mg capsule     Warned patient that Antibiotics decrease the contraceptive efficacy of oral contraceptive pills. Patient notes understanding.

## 2023-05-24 DIAGNOSIS — F90.0 ATTENTION DEFICIT HYPERACTIVITY DISORDER (ADHD), PREDOMINANTLY INATTENTIVE TYPE: Primary | ICD-10-CM

## 2023-05-24 RX ORDER — DEXTROAMPHETAMINE SACCHARATE, AMPHETAMINE ASPARTATE, DEXTROAMPHETAMINE SULFATE AND AMPHETAMINE SULFATE 5; 5; 5; 5 MG/1; MG/1; MG/1; MG/1
20 TABLET ORAL 2 TIMES DAILY
Qty: 60 TABLET | Refills: 0 | Status: SHIPPED | OUTPATIENT
Start: 2023-07-24 | End: 2023-08-23

## 2023-05-24 RX ORDER — DEXTROAMPHETAMINE SACCHARATE, AMPHETAMINE ASPARTATE, DEXTROAMPHETAMINE SULFATE AND AMPHETAMINE SULFATE 5; 5; 5; 5 MG/1; MG/1; MG/1; MG/1
20 TABLET ORAL 2 TIMES DAILY
Qty: 60 TABLET | Refills: 0 | Status: SHIPPED | OUTPATIENT
Start: 2023-06-24 | End: 2023-07-24

## 2023-05-24 RX ORDER — DEXTROAMPHETAMINE SACCHARATE, AMPHETAMINE ASPARTATE, DEXTROAMPHETAMINE SULFATE AND AMPHETAMINE SULFATE 5; 5; 5; 5 MG/1; MG/1; MG/1; MG/1
20 TABLET ORAL 2 TIMES DAILY
Qty: 60 TABLET | Refills: 0 | Status: SHIPPED | OUTPATIENT
Start: 2023-05-24 | End: 2023-06-23

## 2023-08-22 RX ORDER — ESCITALOPRAM OXALATE 20 MG/1
TABLET ORAL
Qty: 90 TABLET | Refills: 0 | Status: SHIPPED | OUTPATIENT
Start: 2023-08-22

## 2023-10-04 ENCOUNTER — OFFICE VISIT (OUTPATIENT)
Age: 33
End: 2023-10-04
Payer: COMMERCIAL

## 2023-10-04 VITALS
OXYGEN SATURATION: 98 % | WEIGHT: 152 LBS | RESPIRATION RATE: 19 BRPM | SYSTOLIC BLOOD PRESSURE: 129 MMHG | HEART RATE: 79 BPM | TEMPERATURE: 97 F | DIASTOLIC BLOOD PRESSURE: 69 MMHG | BODY MASS INDEX: 29.84 KG/M2 | HEIGHT: 60 IN

## 2023-10-04 DIAGNOSIS — R53.83 FATIGUE, UNSPECIFIED TYPE: ICD-10-CM

## 2023-10-04 DIAGNOSIS — R73.02 GLUCOSE INTOLERANCE (IMPAIRED GLUCOSE TOLERANCE): ICD-10-CM

## 2023-10-04 DIAGNOSIS — F41.9 ANXIETY: ICD-10-CM

## 2023-10-04 DIAGNOSIS — K51.211 ULCERATIVE PROCTITIS WITH RECTAL BLEEDING (HCC): ICD-10-CM

## 2023-10-04 DIAGNOSIS — R73.03 PREDIABETES: ICD-10-CM

## 2023-10-04 DIAGNOSIS — F33.0 MILD EPISODE OF RECURRENT MAJOR DEPRESSIVE DISORDER (HCC): ICD-10-CM

## 2023-10-04 DIAGNOSIS — F90.0 ATTENTION DEFICIT HYPERACTIVITY DISORDER (ADHD), PREDOMINANTLY INATTENTIVE TYPE: ICD-10-CM

## 2023-10-04 DIAGNOSIS — K51.211 ULCERATIVE PROCTITIS WITH RECTAL BLEEDING (HCC): Primary | ICD-10-CM

## 2023-10-04 DIAGNOSIS — Z23 NEEDS FLU SHOT: ICD-10-CM

## 2023-10-04 DIAGNOSIS — G56.03 CARPAL TUNNEL SYNDROME, BILATERAL: ICD-10-CM

## 2023-10-04 PROCEDURE — 90674 CCIIV4 VAC NO PRSV 0.5 ML IM: CPT | Performed by: PHYSICIAN ASSISTANT

## 2023-10-04 PROCEDURE — 90471 IMMUNIZATION ADMIN: CPT | Performed by: PHYSICIAN ASSISTANT

## 2023-10-04 PROCEDURE — 99214 OFFICE O/P EST MOD 30 MIN: CPT | Performed by: PHYSICIAN ASSISTANT

## 2023-10-04 RX ORDER — BUPROPION HYDROCHLORIDE 300 MG/1
300 TABLET ORAL EVERY MORNING
Qty: 90 TABLET | Refills: 1 | Status: SHIPPED | OUTPATIENT
Start: 2023-10-04

## 2023-10-04 RX ORDER — DEXTROAMPHETAMINE SACCHARATE, AMPHETAMINE ASPARTATE, DEXTROAMPHETAMINE SULFATE AND AMPHETAMINE SULFATE 5; 5; 5; 5 MG/1; MG/1; MG/1; MG/1
20 TABLET ORAL 2 TIMES DAILY
Qty: 60 TABLET | Refills: 0 | Status: SHIPPED | OUTPATIENT
Start: 2023-11-04 | End: 2023-12-04

## 2023-10-04 RX ORDER — DEXTROAMPHETAMINE SACCHARATE, AMPHETAMINE ASPARTATE, DEXTROAMPHETAMINE SULFATE AND AMPHETAMINE SULFATE 5; 5; 5; 5 MG/1; MG/1; MG/1; MG/1
20 TABLET ORAL 2 TIMES DAILY
Qty: 60 TABLET | Refills: 0 | Status: SHIPPED | OUTPATIENT
Start: 2023-10-04 | End: 2023-11-03

## 2023-10-04 RX ORDER — ESCITALOPRAM OXALATE 20 MG/1
20 TABLET ORAL DAILY
Qty: 90 TABLET | Refills: 1 | Status: SHIPPED | OUTPATIENT
Start: 2023-10-04

## 2023-10-04 RX ORDER — DEXTROAMPHETAMINE SACCHARATE, AMPHETAMINE ASPARTATE, DEXTROAMPHETAMINE SULFATE AND AMPHETAMINE SULFATE 5; 5; 5; 5 MG/1; MG/1; MG/1; MG/1
20 TABLET ORAL 2 TIMES DAILY
Qty: 60 TABLET | Refills: 0 | Status: SHIPPED | OUTPATIENT
Start: 2023-12-04 | End: 2024-01-03

## 2023-10-04 SDOH — ECONOMIC STABILITY: HOUSING INSECURITY
IN THE LAST 12 MONTHS, WAS THERE A TIME WHEN YOU DID NOT HAVE A STEADY PLACE TO SLEEP OR SLEPT IN A SHELTER (INCLUDING NOW)?: NO

## 2023-10-04 SDOH — ECONOMIC STABILITY: INCOME INSECURITY: HOW HARD IS IT FOR YOU TO PAY FOR THE VERY BASICS LIKE FOOD, HOUSING, MEDICAL CARE, AND HEATING?: HARD

## 2023-10-04 SDOH — ECONOMIC STABILITY: FOOD INSECURITY: WITHIN THE PAST 12 MONTHS, YOU WORRIED THAT YOUR FOOD WOULD RUN OUT BEFORE YOU GOT MONEY TO BUY MORE.: SOMETIMES TRUE

## 2023-10-04 SDOH — ECONOMIC STABILITY: FOOD INSECURITY: WITHIN THE PAST 12 MONTHS, THE FOOD YOU BOUGHT JUST DIDN'T LAST AND YOU DIDN'T HAVE MONEY TO GET MORE.: SOMETIMES TRUE

## 2023-10-04 ASSESSMENT — ENCOUNTER SYMPTOMS
SHORTNESS OF BREATH: 0
COUGH: 0

## 2023-10-05 LAB
ALBUMIN SERPL-MCNC: 3.7 G/DL (ref 3.5–5)
ALBUMIN/GLOB SERPL: 1 (ref 1.1–2.2)
ALP SERPL-CCNC: 88 U/L (ref 45–117)
ALT SERPL-CCNC: 29 U/L (ref 12–78)
ANION GAP SERPL CALC-SCNC: 4 MMOL/L (ref 5–15)
AST SERPL-CCNC: 17 U/L (ref 15–37)
BASOPHILS # BLD: 0.1 K/UL (ref 0–0.1)
BASOPHILS NFR BLD: 1 % (ref 0–1)
BILIRUB SERPL-MCNC: 0.3 MG/DL (ref 0.2–1)
BUN SERPL-MCNC: 14 MG/DL (ref 6–20)
BUN/CREAT SERPL: 18 (ref 12–20)
CALCIUM SERPL-MCNC: 8.9 MG/DL (ref 8.5–10.1)
CHLORIDE SERPL-SCNC: 106 MMOL/L (ref 97–108)
CO2 SERPL-SCNC: 28 MMOL/L (ref 21–32)
CREAT SERPL-MCNC: 0.76 MG/DL (ref 0.55–1.02)
DIFFERENTIAL METHOD BLD: ABNORMAL
EOSINOPHIL # BLD: 0.1 K/UL (ref 0–0.4)
EOSINOPHIL NFR BLD: 1 % (ref 0–7)
ERYTHROCYTE [DISTWIDTH] IN BLOOD BY AUTOMATED COUNT: 14.6 % (ref 11.5–14.5)
EST. AVERAGE GLUCOSE BLD GHB EST-MCNC: 111 MG/DL
GLOBULIN SER CALC-MCNC: 3.7 G/DL (ref 2–4)
GLUCOSE SERPL-MCNC: 112 MG/DL (ref 65–100)
HBA1C MFR BLD: 5.5 % (ref 4–5.6)
HCT VFR BLD AUTO: 37.9 % (ref 35–47)
HGB BLD-MCNC: 12.1 G/DL (ref 11.5–16)
IMM GRANULOCYTES # BLD AUTO: 0 K/UL (ref 0–0.04)
IMM GRANULOCYTES NFR BLD AUTO: 0 % (ref 0–0.5)
LYMPHOCYTES # BLD: 0.5 K/UL (ref 0.8–3.5)
LYMPHOCYTES NFR BLD: 6 % (ref 12–49)
MCH RBC QN AUTO: 26.3 PG (ref 26–34)
MCHC RBC AUTO-ENTMCNC: 31.9 G/DL (ref 30–36.5)
MCV RBC AUTO: 82.4 FL (ref 80–99)
MONOCYTES # BLD: 0.6 K/UL (ref 0–1)
MONOCYTES NFR BLD: 8 % (ref 5–13)
NEUTS SEG # BLD: 6.4 K/UL (ref 1.8–8)
NEUTS SEG NFR BLD: 84 % (ref 32–75)
NRBC # BLD: 0 K/UL (ref 0–0.01)
NRBC BLD-RTO: 0 PER 100 WBC
PLATELET # BLD AUTO: 339 K/UL (ref 150–400)
PMV BLD AUTO: 10.2 FL (ref 8.9–12.9)
POTASSIUM SERPL-SCNC: 4.5 MMOL/L (ref 3.5–5.1)
PROT SERPL-MCNC: 7.4 G/DL (ref 6.4–8.2)
RBC # BLD AUTO: 4.6 M/UL (ref 3.8–5.2)
RBC MORPH BLD: ABNORMAL
SODIUM SERPL-SCNC: 138 MMOL/L (ref 136–145)
TSH SERPL DL<=0.05 MIU/L-ACNC: 0.98 UIU/ML (ref 0.36–3.74)
WBC # BLD AUTO: 7.7 K/UL (ref 3.6–11)

## 2023-11-17 DIAGNOSIS — F90.0 ATTENTION DEFICIT HYPERACTIVITY DISORDER (ADHD), PREDOMINANTLY INATTENTIVE TYPE: ICD-10-CM

## 2023-11-20 RX ORDER — DEXTROAMPHETAMINE SACCHARATE, AMPHETAMINE ASPARTATE, DEXTROAMPHETAMINE SULFATE AND AMPHETAMINE SULFATE 5; 5; 5; 5 MG/1; MG/1; MG/1; MG/1
20 TABLET ORAL 2 TIMES DAILY
Qty: 60 TABLET | Refills: 0 | OUTPATIENT
Start: 2023-11-20 | End: 2023-12-20

## 2023-11-30 DIAGNOSIS — F33.0 MILD EPISODE OF RECURRENT MAJOR DEPRESSIVE DISORDER (HCC): ICD-10-CM

## 2023-11-30 DIAGNOSIS — F41.9 ANXIETY: ICD-10-CM

## 2023-11-30 DIAGNOSIS — F90.0 ATTENTION DEFICIT HYPERACTIVITY DISORDER (ADHD), PREDOMINANTLY INATTENTIVE TYPE: ICD-10-CM

## 2023-11-30 RX ORDER — ESCITALOPRAM OXALATE 20 MG/1
20 TABLET ORAL DAILY
Qty: 90 TABLET | Refills: 1 | Status: CANCELLED | OUTPATIENT
Start: 2023-11-30

## 2023-11-30 NOTE — TELEPHONE ENCOUNTER
Refill request received from [facility] for   Requested Prescriptions     Pending Prescriptions Disp Refills    amphetamine-dextroamphetamine (ADDERALL) 20 MG tablet 60 tablet 0     Sig: Take 1 tablet by mouth 2 times daily for 30 days. Max Daily Amount: 40 mg     10/4/2023   Visit date not found     Routed to Elisabeth Alvarez for review. Anshul De Los Santos

## 2023-12-01 RX ORDER — DEXTROAMPHETAMINE SACCHARATE, AMPHETAMINE ASPARTATE, DEXTROAMPHETAMINE SULFATE AND AMPHETAMINE SULFATE 5; 5; 5; 5 MG/1; MG/1; MG/1; MG/1
20 TABLET ORAL 2 TIMES DAILY
Qty: 60 TABLET | Refills: 0 | OUTPATIENT
Start: 2023-12-04 | End: 2024-01-03

## 2023-12-05 DIAGNOSIS — F90.0 ATTENTION DEFICIT HYPERACTIVITY DISORDER (ADHD), PREDOMINANTLY INATTENTIVE TYPE: ICD-10-CM

## 2023-12-05 NOTE — TELEPHONE ENCOUNTER
Patient in need of refill on amphetamine-dextroamphetamine (ADDERALL) 20 MG tablet to be sent to the [facility] on file.

## 2023-12-05 NOTE — TELEPHONE ENCOUNTER
Chief Complaint   Patient presents with    Medication Refill       Requested Prescriptions     Pending Prescriptions Disp Refills    amphetamine-dextroamphetamine (ADDERALL) 20 MG tablet 60 tablet 0     Sig: Take 1 tablet by mouth 2 times daily for 30 days. Max Daily Amount: 40 mg       Allergies:   Allergies   Allergen Reactions    Cephalexin Rash    Iodine Hives    Penicillins Rash       Last visit with clinic:  10/4/2023   Next visit with clinic: Visit date not found     Last visit with this provider: 10/4/2023   Next Visit with this provider: Visit date not found    Signed by Libra Maynard CCT  12/05/23  2:12 PM

## 2023-12-06 RX ORDER — DEXTROAMPHETAMINE SACCHARATE, AMPHETAMINE ASPARTATE, DEXTROAMPHETAMINE SULFATE AND AMPHETAMINE SULFATE 5; 5; 5; 5 MG/1; MG/1; MG/1; MG/1
20 TABLET ORAL 2 TIMES DAILY
Qty: 60 TABLET | Refills: 0 | OUTPATIENT
Start: 2023-12-06 | End: 2024-01-05

## 2023-12-26 RX ORDER — NORETHINDRONE ACETATE AND ETHINYL ESTRADIOL AND FERROUS FUMARATE 1MG-20(24)
1 KIT ORAL DAILY
Qty: 84 TABLET | OUTPATIENT
Start: 2023-12-26

## 2024-01-03 RX ORDER — NORETHINDRONE ACETATE AND ETHINYL ESTRADIOL 1MG-20(24)
1 KIT ORAL DAILY
Qty: 1 PACKET | Refills: 1 | Status: SHIPPED | OUTPATIENT
Start: 2024-01-03

## 2024-01-03 RX ORDER — NORETHINDRONE ACETATE AND ETHINYL ESTRADIOL AND FERROUS FUMARATE 1MG-20(24)
1 KIT ORAL DAILY
Qty: 84 TABLET | OUTPATIENT
Start: 2024-01-03

## 2024-01-25 ENCOUNTER — OFFICE VISIT (OUTPATIENT)
Age: 34
End: 2024-01-25
Payer: COMMERCIAL

## 2024-01-25 VITALS — WEIGHT: 160.2 LBS | DIASTOLIC BLOOD PRESSURE: 69 MMHG | BODY MASS INDEX: 31.29 KG/M2 | SYSTOLIC BLOOD PRESSURE: 111 MMHG

## 2024-01-25 DIAGNOSIS — Z87.42 HISTORY OF MENORRHAGIA: ICD-10-CM

## 2024-01-25 DIAGNOSIS — Z01.419 ENCOUNTER FOR GYNECOLOGICAL EXAMINATION (GENERAL) (ROUTINE) WITHOUT ABNORMAL FINDINGS: Primary | ICD-10-CM

## 2024-01-25 PROCEDURE — 99395 PREV VISIT EST AGE 18-39: CPT | Performed by: OBSTETRICS & GYNECOLOGY

## 2024-01-25 RX ORDER — NORETHINDRONE ACETATE AND ETHINYL ESTRADIOL 1MG-20(24)
1 KIT ORAL DAILY
Qty: 3 PACKET | Refills: 4 | Status: SHIPPED | OUTPATIENT
Start: 2024-01-25

## 2024-01-25 ASSESSMENT — PATIENT HEALTH QUESTIONNAIRE - PHQ9
6. FEELING BAD ABOUT YOURSELF - OR THAT YOU ARE A FAILURE OR HAVE LET YOURSELF OR YOUR FAMILY DOWN: 0
SUM OF ALL RESPONSES TO PHQ QUESTIONS 1-9: 0
SUM OF ALL RESPONSES TO PHQ9 QUESTIONS 1 & 2: 0
SUM OF ALL RESPONSES TO PHQ QUESTIONS 1-9: 0
1. LITTLE INTEREST OR PLEASURE IN DOING THINGS: 0
4. FEELING TIRED OR HAVING LITTLE ENERGY: 0
3. TROUBLE FALLING OR STAYING ASLEEP: 0
7. TROUBLE CONCENTRATING ON THINGS, SUCH AS READING THE NEWSPAPER OR WATCHING TELEVISION: 0
SUM OF ALL RESPONSES TO PHQ QUESTIONS 1-9: 0
2. FEELING DOWN, DEPRESSED OR HOPELESS: 0
5. POOR APPETITE OR OVEREATING: 0
9. THOUGHTS THAT YOU WOULD BE BETTER OFF DEAD, OR OF HURTING YOURSELF: 0
8. MOVING OR SPEAKING SO SLOWLY THAT OTHER PEOPLE COULD HAVE NOTICED. OR THE OPPOSITE, BEING SO FIGETY OR RESTLESS THAT YOU HAVE BEEN MOVING AROUND A LOT MORE THAN USUAL: 0
10. IF YOU CHECKED OFF ANY PROBLEMS, HOW DIFFICULT HAVE THESE PROBLEMS MADE IT FOR YOU TO DO YOUR WORK, TAKE CARE OF THINGS AT HOME, OR GET ALONG WITH OTHER PEOPLE: 0
SUM OF ALL RESPONSES TO PHQ QUESTIONS 1-9: 0

## 2024-01-25 NOTE — PROGRESS NOTES
Fatemeh Ellison is a 33 y.o. female returns for an annual exam     Chief Complaint   Patient presents with    Annual Exam       No LMP recorded. (Menstrual status: Chemically Induced).  Her periods are minimal to nonexistent due to continuous OCP  She does not have dysmenorrhea.  Problems: no problems  Birth Control: OCP (estrogen/progesterone).  Last Pap: 9/15/2021 normal/HPV neg  She does not have a history of TRACEE 2, 3 or cervical cancer.   With regard to the Gardisil vaccine, she is older than the FDA approved age to receive it        Examination chaperoned by Cydney Santamaria MA.  
mood normal, affect appropriate    .  Assessment:  Routine gynecologic examination  Her current medical status is satisfactory with no evidence of significant gynecologic issues.    Plan:  Counseled re: diet, exercise, healthy lifestyle  Return for yearly wellness visits  Continuous OCP

## 2024-01-27 DIAGNOSIS — F90.0 ATTENTION DEFICIT HYPERACTIVITY DISORDER (ADHD), PREDOMINANTLY INATTENTIVE TYPE: ICD-10-CM

## 2024-01-29 RX ORDER — DEXTROAMPHETAMINE SACCHARATE, AMPHETAMINE ASPARTATE, DEXTROAMPHETAMINE SULFATE AND AMPHETAMINE SULFATE 5; 5; 5; 5 MG/1; MG/1; MG/1; MG/1
20 TABLET ORAL 2 TIMES DAILY
Qty: 60 TABLET | Refills: 0 | Status: SHIPPED | OUTPATIENT
Start: 2024-02-29 | End: 2024-03-30

## 2024-01-29 RX ORDER — DEXTROAMPHETAMINE SACCHARATE, AMPHETAMINE ASPARTATE, DEXTROAMPHETAMINE SULFATE AND AMPHETAMINE SULFATE 5; 5; 5; 5 MG/1; MG/1; MG/1; MG/1
20 TABLET ORAL 2 TIMES DAILY
Qty: 60 TABLET | Refills: 0 | Status: SHIPPED | OUTPATIENT
Start: 2024-01-29 | End: 2024-02-28

## 2024-02-06 ENCOUNTER — OFFICE VISIT (OUTPATIENT)
Age: 34
End: 2024-02-06
Payer: COMMERCIAL

## 2024-02-06 VITALS
DIASTOLIC BLOOD PRESSURE: 71 MMHG | BODY MASS INDEX: 32.98 KG/M2 | TEMPERATURE: 98 F | HEART RATE: 80 BPM | SYSTOLIC BLOOD PRESSURE: 119 MMHG | WEIGHT: 168 LBS | OXYGEN SATURATION: 98 % | HEIGHT: 60 IN | RESPIRATION RATE: 20 BRPM

## 2024-02-06 DIAGNOSIS — H66.93 ACUTE OTITIS MEDIA, BILATERAL: ICD-10-CM

## 2024-02-06 DIAGNOSIS — J02.9 SORE THROAT: ICD-10-CM

## 2024-02-06 DIAGNOSIS — H10.9 CONJUNCTIVITIS OF BOTH EYES, UNSPECIFIED CONJUNCTIVITIS TYPE: ICD-10-CM

## 2024-02-06 DIAGNOSIS — J02.0 STREP PHARYNGITIS: Primary | ICD-10-CM

## 2024-02-06 LAB
GROUP A STREP ANTIGEN, POC: POSITIVE
VALID INTERNAL CONTROL, POC: YES

## 2024-02-06 PROCEDURE — 99214 OFFICE O/P EST MOD 30 MIN: CPT | Performed by: PHYSICIAN ASSISTANT

## 2024-02-06 PROCEDURE — 87880 STREP A ASSAY W/OPTIC: CPT | Performed by: PHYSICIAN ASSISTANT

## 2024-02-06 RX ORDER — POLYMYXIN B SULFATE AND TRIMETHOPRIM 1; 10000 MG/ML; [USP'U]/ML
1 SOLUTION OPHTHALMIC EVERY 4 HOURS
Qty: 1 EACH | Refills: 0 | Status: SHIPPED | OUTPATIENT
Start: 2024-02-06 | End: 2024-02-13

## 2024-02-06 RX ORDER — CLINDAMYCIN HYDROCHLORIDE 300 MG/1
300 CAPSULE ORAL 2 TIMES DAILY
Qty: 14 CAPSULE | Refills: 0 | Status: SHIPPED | OUTPATIENT
Start: 2024-02-06 | End: 2024-02-13

## 2024-02-06 ASSESSMENT — ENCOUNTER SYMPTOMS
SHORTNESS OF BREATH: 0
TROUBLE SWALLOWING: 0
EYE REDNESS: 1
COUGH: 0
RHINORRHEA: 1
SORE THROAT: 1
SINUS PAIN: 0
EYE PAIN: 0
EYE DISCHARGE: 1
EYE ITCHING: 1

## 2024-02-06 NOTE — PROGRESS NOTES
Fatemeh Ellison is a 33 y.o. female  Chief Complaint   Patient presents with    Conjunctivitis     Start on Saturday- Pink eye on right side- last week- strep throat- fluid in ears-      Vitals:    02/06/24 1358   BP: 119/71   Pulse: 80   Resp: 20   Temp: 98 °F (36.7 °C)   SpO2: 98%      Wt Readings from Last 3 Encounters:   02/06/24 76.2 kg (168 lb)   01/25/24 72.7 kg (160 lb 3.2 oz)   12/19/23 68.9 kg (152 lb)     BMI Readings from Last 3 Encounters:   02/06/24 32.81 kg/m²   01/25/24 31.29 kg/m²   12/19/23 29.69 kg/m²     Health Maintenance Due   Topic Date Due    Hepatitis B vaccine (1 of 3 - 3-dose series) Never done    Varicella vaccine (1 of 2 - 2-dose childhood series) Never done    COVID-19 Vaccine (3 - 2023-24 season) 09/01/2023     HPI  Kids all tested positive for strep. Son had conjunctivitis and ear infection as well  Pt has sore throat, B ear pain, B eye inflammation.   [facility] gave pt drops for eyes. Helping somewhat but this morning worsened again: glued shut.     ROS  Review of Systems   Constitutional:  Negative for fever.   HENT:  Positive for congestion, ear pain, postnasal drip, rhinorrhea and sore throat. Negative for sinus pain and trouble swallowing.    Eyes:  Positive for discharge, redness and itching. Negative for pain and visual disturbance.   Respiratory:  Negative for cough and shortness of breath.    Cardiovascular:  Negative for chest pain and palpitations.   Skin:  Negative for rash.   Allergic/Immunologic: Negative for environmental allergies.   Neurological:  Negative for dizziness and headaches.   Hematological:  Negative for adenopathy.   Psychiatric/Behavioral:  Negative for dysphoric mood.      EXAM  Physical Exam  Vitals and nursing note reviewed.   Constitutional:       General: She is not in acute distress.     Appearance: Normal appearance. She is not toxic-appearing.   HENT:      Head: Normocephalic and atraumatic.      Ears:      Comments: Bilateral TMs with fluid. No

## 2024-02-06 NOTE — PROGRESS NOTES
I have reviewed all needed documentation in preparation for visit. Verified patient by name and date of birth  Chief Complaint   Patient presents with    Conjunctivitis     Start on Saturday- Pink eye on right side- last week- strep throat- fluid in ears-        Vitals:    02/06/24 1358   BP: 119/71   Site: Right Upper Arm   Position: Sitting   Cuff Size: Medium Adult   Pulse: 80   Resp: 20   Temp: 98 °F (36.7 °C)   TempSrc: Temporal   SpO2: 98%   Weight: 76.2 kg (168 lb)   Height: 1.524 m (5')       Health Maintenance Due   Topic Date Due    Hepatitis B vaccine (1 of 3 - 3-dose series) Never done    Varicella vaccine (1 of 2 - 2-dose childhood series) Never done    COVID-19 Vaccine (3 - 2023-24 season) 09/01/2023       1. \"Have you been to the ER, urgent care clinic since your last visit?  Hospitalized since your last visit?\" YES    2. \"Have you seen or consulted any other health care providers outside of the [facility] since your last visit?\" YES     3. For patients aged 45-75: Has the patient had a colonoscopy / FIT/ Cologuard? NA - based on age      If the patient is female:    4. For patients aged 40-74: Has the patient had a mammogram within the past 2 years? NA - based on age or sex      5. For patients aged 21-65: Has the patient had a pap smear? Yes - no Care Gap present        Olena holden, [facility]

## 2024-02-12 ENCOUNTER — PATIENT MESSAGE (OUTPATIENT)
Age: 34
End: 2024-02-12

## 2024-02-12 DIAGNOSIS — F41.9 ANXIETY: ICD-10-CM

## 2024-02-12 DIAGNOSIS — F33.0 MILD EPISODE OF RECURRENT MAJOR DEPRESSIVE DISORDER (HCC): ICD-10-CM

## 2024-02-12 RX ORDER — ESCITALOPRAM OXALATE 20 MG/1
20 TABLET ORAL DAILY
Qty: 90 TABLET | Refills: 1 | Status: SHIPPED | OUTPATIENT
Start: 2024-02-12

## 2024-02-12 NOTE — TELEPHONE ENCOUNTER
From: Fatemeh Ellison  To: Andra Gipson  Sent: 2/12/2024 6:33 AM EST  Subject: ESCITALOPRAM    Can I get a refil of my ESCITALOPRAM? But can you send it to the [facility] location and not the [facility] location? Thank you.

## 2024-02-21 DIAGNOSIS — F41.9 ANXIETY: ICD-10-CM

## 2024-02-21 DIAGNOSIS — F90.0 ATTENTION DEFICIT HYPERACTIVITY DISORDER (ADHD), PREDOMINANTLY INATTENTIVE TYPE: ICD-10-CM

## 2024-02-21 DIAGNOSIS — F33.0 MILD EPISODE OF RECURRENT MAJOR DEPRESSIVE DISORDER (HCC): ICD-10-CM

## 2024-02-21 RX ORDER — BUPROPION HYDROCHLORIDE 300 MG/1
300 TABLET ORAL EVERY MORNING
Qty: 90 TABLET | Refills: 1 | Status: SHIPPED | OUTPATIENT
Start: 2024-02-21

## 2024-02-21 NOTE — TELEPHONE ENCOUNTER
Refill request received from [facility] for   Requested Prescriptions     Pending Prescriptions Disp Refills    buPROPion (WELLBUTRIN XL) 300 MG extended release tablet 90 tablet 1     Sig: Take 1 tablet by mouth every morning     Last office visit: 2/6/2024   Next office visit: Visit date not found     Routed to MELISSA Zuluaga for review.

## 2024-03-04 ENCOUNTER — OFFICE VISIT (OUTPATIENT)
Age: 34
End: 2024-03-04
Payer: COMMERCIAL

## 2024-03-04 VITALS
WEIGHT: 164 LBS | SYSTOLIC BLOOD PRESSURE: 109 MMHG | DIASTOLIC BLOOD PRESSURE: 51 MMHG | OXYGEN SATURATION: 98 % | HEART RATE: 70 BPM | BODY MASS INDEX: 32.2 KG/M2 | RESPIRATION RATE: 18 BRPM | HEIGHT: 60 IN | TEMPERATURE: 97.5 F

## 2024-03-04 DIAGNOSIS — F33.0 MILD EPISODE OF RECURRENT MAJOR DEPRESSIVE DISORDER (HCC): ICD-10-CM

## 2024-03-04 DIAGNOSIS — M54.2 CHRONIC NECK PAIN: ICD-10-CM

## 2024-03-04 DIAGNOSIS — R04.0 EPISTAXIS: ICD-10-CM

## 2024-03-04 DIAGNOSIS — S61.218A LACERATION OF MIDDLE FINGER WITHOUT FOREIGN BODY WITHOUT DAMAGE TO NAIL, UNSPECIFIED LATERALITY, INITIAL ENCOUNTER: ICD-10-CM

## 2024-03-04 DIAGNOSIS — F51.04 PSYCHOPHYSIOLOGIC INSOMNIA: ICD-10-CM

## 2024-03-04 DIAGNOSIS — Z63.5 MARITAL CONFLICT INVOLVING ESTRANGEMENT: ICD-10-CM

## 2024-03-04 DIAGNOSIS — F90.0 ATTENTION DEFICIT HYPERACTIVITY DISORDER (ADHD), PREDOMINANTLY INATTENTIVE TYPE: ICD-10-CM

## 2024-03-04 DIAGNOSIS — G89.29 CHRONIC NECK PAIN: ICD-10-CM

## 2024-03-04 DIAGNOSIS — F41.9 ANXIETY: Primary | ICD-10-CM

## 2024-03-04 PROCEDURE — 99214 OFFICE O/P EST MOD 30 MIN: CPT | Performed by: PHYSICIAN ASSISTANT

## 2024-03-04 PROCEDURE — G2211 COMPLEX E/M VISIT ADD ON: HCPCS | Performed by: PHYSICIAN ASSISTANT

## 2024-03-04 RX ORDER — HYDROXYZINE 50 MG/1
25-50 TABLET, FILM COATED ORAL
Qty: 90 TABLET | Refills: 3 | Status: SHIPPED | OUTPATIENT
Start: 2024-03-04

## 2024-03-04 SDOH — SOCIAL STABILITY - SOCIAL INSECURITY: DISRUPTION OF FAMILY BY SEPARATION AND DIVORCE: Z63.5

## 2024-03-04 ASSESSMENT — ENCOUNTER SYMPTOMS
SHORTNESS OF BREATH: 0
COUGH: 0

## 2024-03-04 NOTE — PROGRESS NOTES
Fatemeh Ellison is a 33 y.o. female  Chief Complaint   Patient presents with    Medication Adjustment     Cut finger on 0/29/24- left middle finger-  wants to increase lexapro and bupropion-      Vitals:    03/04/24 0815   BP: (!) 109/51   Pulse: 70   Resp: 18   Temp: 97.5 °F (36.4 °C)   SpO2: 98%      Wt Readings from Last 3 Encounters:   03/04/24 74.4 kg (164 lb)   02/06/24 76.2 kg (168 lb)   01/25/24 72.7 kg (160 lb 3.2 oz)     BMI Readings from Last 3 Encounters:   03/04/24 32.03 kg/m²   02/06/24 32.81 kg/m²   01/25/24 31.29 kg/m²     Health Maintenance Due   Topic Date Due    Hepatitis B vaccine (1 of 3 - 3-dose series) Never done    Varicella vaccine (1 of 2 - 2-dose childhood series) Never done    COVID-19 Vaccine (3 - 2023-24 season) 09/01/2023     HPI  2/29/24 was cutting hair with sharp new scissors and accidentally cut piece of skin off.      and planning divorce.  moving out in a few weeks  Anxious especially at night.   Has 7-8 hours to sleep nightly. Feeling that she needs 9+ hours per night.   Waking up frequently at night because 1 yo is in pt's room. Planning to move 1 yo to own bedroom after  moves out in a few weeks.     Chronic neck pain with stress. Not stretching regularly.     ROS  Review of Systems   Constitutional:  Negative for fever.   Respiratory:  Negative for cough and shortness of breath.    Cardiovascular:  Negative for chest pain and palpitations.   Skin:  Negative for rash.   Neurological:  Negative for headaches.   Psychiatric/Behavioral:  Negative for dysphoric mood.      EXAM  Physical Exam  Vitals and nursing note reviewed.   Constitutional:       Appearance: Normal appearance.   HENT:      Head: Normocephalic and atraumatic.   Neck:      Vascular: No carotid bruit.   Cardiovascular:      Rate and Rhythm: Normal rate and regular rhythm.      Heart sounds: Normal heart sounds.   Pulmonary:      Effort: Pulmonary effort is normal. No respiratory distress.

## 2024-03-04 NOTE — PROGRESS NOTES
I have reviewed all needed documentation in preparation for visit. Verified patient by name and date of birth  Chief Complaint   Patient presents with    Medication Adjustment     Cut finger on 0/29/24- left middle finger-  wants to increase lexapro and bupropion-        Vitals:    03/04/24 0815   BP: (!) 109/51   Site: Right Upper Arm   Position: Sitting   Cuff Size: Medium Adult   Pulse: 70   Resp: 18   Temp: 97.5 °F (36.4 °C)   TempSrc: Temporal   SpO2: 98%   Weight: 74.4 kg (164 lb)   Height: 1.524 m (5')       Health Maintenance Due   Topic Date Due    Hepatitis B vaccine (1 of 3 - 3-dose series) Never done    Varicella vaccine (1 of 2 - 2-dose childhood series) Never done    COVID-19 Vaccine (3 - 2023-24 season) 09/01/2023       1. \"Have you been to the ER, urgent care clinic since your last visit?  Hospitalized since your last visit?\" No    2. \"Have you seen or consulted any other health care providers outside of the [facility] since your last visit?\" No     3. For patients aged 45-75: Has the patient had a colonoscopy / FIT/ Cologuard? NA - based on age      If the patient is female:    4. For patients aged 40-74: Has the patient had a mammogram within the past 2 years? NA - based on age or sex      5. For patients aged 21-65: Has the patient had a pap smear? Yes - no Care Gap present        Olena holden, [facility]

## 2024-03-05 LAB
BASOPHILS # BLD: 0 K/UL (ref 0–0.1)
BASOPHILS NFR BLD: 1 % (ref 0–1)
DIFFERENTIAL METHOD BLD: ABNORMAL
EOSINOPHIL # BLD: 0.1 K/UL (ref 0–0.4)
EOSINOPHIL NFR BLD: 3 % (ref 0–7)
ERYTHROCYTE [DISTWIDTH] IN BLOOD BY AUTOMATED COUNT: 14.4 % (ref 11.5–14.5)
HCT VFR BLD AUTO: 37.9 % (ref 35–47)
HGB BLD-MCNC: 12.4 G/DL (ref 11.5–16)
IMM GRANULOCYTES # BLD AUTO: 0 K/UL (ref 0–0.04)
IMM GRANULOCYTES NFR BLD AUTO: 0 % (ref 0–0.5)
LYMPHOCYTES # BLD: 0.5 K/UL (ref 0.8–3.5)
LYMPHOCYTES NFR BLD: 11 % (ref 12–49)
MCH RBC QN AUTO: 27.1 PG (ref 26–34)
MCHC RBC AUTO-ENTMCNC: 32.7 G/DL (ref 30–36.5)
MCV RBC AUTO: 82.8 FL (ref 80–99)
MONOCYTES # BLD: 0.5 K/UL (ref 0–1)
MONOCYTES NFR BLD: 11 % (ref 5–13)
NEUTS SEG # BLD: 3.5 K/UL (ref 1.8–8)
NEUTS SEG NFR BLD: 74 % (ref 32–75)
NRBC # BLD: 0 K/UL (ref 0–0.01)
NRBC BLD-RTO: 0 PER 100 WBC
PLATELET # BLD AUTO: 303 K/UL (ref 150–400)
PMV BLD AUTO: 10.6 FL (ref 8.9–12.9)
RBC # BLD AUTO: 4.58 M/UL (ref 3.8–5.2)
RBC MORPH BLD: ABNORMAL
WBC # BLD AUTO: 4.6 K/UL (ref 3.6–11)

## 2024-03-06 DIAGNOSIS — F90.0 ATTENTION DEFICIT HYPERACTIVITY DISORDER (ADHD), PREDOMINANTLY INATTENTIVE TYPE: ICD-10-CM

## 2024-03-07 LAB — TSH SERPL DL<=0.05 MIU/L-ACNC: 2.4 UIU/ML (ref 0.45–4.5)

## 2024-03-07 RX ORDER — DEXTROAMPHETAMINE SACCHARATE, AMPHETAMINE ASPARTATE, DEXTROAMPHETAMINE SULFATE AND AMPHETAMINE SULFATE 5; 5; 5; 5 MG/1; MG/1; MG/1; MG/1
20 TABLET ORAL 2 TIMES DAILY
Qty: 60 TABLET | Refills: 0 | Status: SHIPPED | OUTPATIENT
Start: 2024-03-27 | End: 2024-04-26

## 2024-03-23 NOTE — TELEPHONE ENCOUNTER
From: Kanwal Hess  To: Kaci Aguilar PA-C  Sent: 10/27/2022 11:01 AM EDT  Subject: Still sick    Adah Shearing I am still pretty sick with a lot of congestion and a lot of mucus. Are you able to prescribe a sinus infection medication? 50

## 2024-04-09 ENCOUNTER — OFFICE VISIT (OUTPATIENT)
Age: 34
End: 2024-04-09
Payer: COMMERCIAL

## 2024-04-09 VITALS
RESPIRATION RATE: 18 BRPM | OXYGEN SATURATION: 97 % | TEMPERATURE: 97.6 F | WEIGHT: 160.6 LBS | BODY MASS INDEX: 31.53 KG/M2 | SYSTOLIC BLOOD PRESSURE: 139 MMHG | HEIGHT: 60 IN | HEART RATE: 85 BPM | DIASTOLIC BLOOD PRESSURE: 79 MMHG

## 2024-04-09 DIAGNOSIS — F41.9 ANXIETY: ICD-10-CM

## 2024-04-09 DIAGNOSIS — J40 BRONCHITIS: ICD-10-CM

## 2024-04-09 DIAGNOSIS — F51.04 PSYCHOPHYSIOLOGIC INSOMNIA: ICD-10-CM

## 2024-04-09 DIAGNOSIS — J45.41 RAD (REACTIVE AIRWAY DISEASE), MODERATE PERSISTENT, WITH ACUTE EXACERBATION: ICD-10-CM

## 2024-04-09 DIAGNOSIS — K51.211 ULCERATIVE PROCTITIS WITH RECTAL BLEEDING (HCC): ICD-10-CM

## 2024-04-09 DIAGNOSIS — Z63.5 MARITAL CONFLICT INVOLVING ESTRANGEMENT: ICD-10-CM

## 2024-04-09 DIAGNOSIS — F33.0 MILD EPISODE OF RECURRENT MAJOR DEPRESSIVE DISORDER (HCC): ICD-10-CM

## 2024-04-09 DIAGNOSIS — Z02.89 MEDICATION MANAGEMENT CONTRACT SIGNED: ICD-10-CM

## 2024-04-09 DIAGNOSIS — J30.9 ALLERGIC RHINITIS, UNSPECIFIED SEASONALITY, UNSPECIFIED TRIGGER: ICD-10-CM

## 2024-04-09 DIAGNOSIS — F90.0 ATTENTION DEFICIT HYPERACTIVITY DISORDER (ADHD), PREDOMINANTLY INATTENTIVE TYPE: Primary | ICD-10-CM

## 2024-04-09 PROCEDURE — 99214 OFFICE O/P EST MOD 30 MIN: CPT | Performed by: PHYSICIAN ASSISTANT

## 2024-04-09 RX ORDER — ARIPIPRAZOLE 5 MG/1
5 TABLET ORAL NIGHTLY
Qty: 30 TABLET | Refills: 2 | Status: SHIPPED | OUTPATIENT
Start: 2024-04-09

## 2024-04-09 RX ORDER — DEXTROAMPHETAMINE SACCHARATE, AMPHETAMINE ASPARTATE, DEXTROAMPHETAMINE SULFATE AND AMPHETAMINE SULFATE 5; 5; 5; 5 MG/1; MG/1; MG/1; MG/1
20 TABLET ORAL 2 TIMES DAILY
Qty: 60 TABLET | Refills: 0 | Status: SHIPPED | OUTPATIENT
Start: 2024-04-09 | End: 2024-05-09

## 2024-04-09 RX ORDER — ALBUTEROL SULFATE 90 UG/1
AEROSOL, METERED RESPIRATORY (INHALATION)
COMMUNITY
Start: 2024-04-04

## 2024-04-09 RX ORDER — FLUTICASONE PROPIONATE AND SALMETEROL XINAFOATE 115; 21 UG/1; UG/1
2 AEROSOL, METERED RESPIRATORY (INHALATION) 2 TIMES DAILY
Qty: 1 EACH | Refills: 5 | Status: SHIPPED | OUTPATIENT
Start: 2024-04-09

## 2024-04-09 RX ORDER — DEXTROAMPHETAMINE SACCHARATE, AMPHETAMINE ASPARTATE, DEXTROAMPHETAMINE SULFATE AND AMPHETAMINE SULFATE 5; 5; 5; 5 MG/1; MG/1; MG/1; MG/1
20 TABLET ORAL 2 TIMES DAILY
Qty: 60 TABLET | Refills: 0 | Status: SHIPPED | OUTPATIENT
Start: 2024-06-09 | End: 2024-07-09

## 2024-04-09 RX ORDER — DEXTROAMPHETAMINE SACCHARATE, AMPHETAMINE ASPARTATE, DEXTROAMPHETAMINE SULFATE AND AMPHETAMINE SULFATE 5; 5; 5; 5 MG/1; MG/1; MG/1; MG/1
20 TABLET ORAL 2 TIMES DAILY
Qty: 60 TABLET | Refills: 0 | Status: SHIPPED | OUTPATIENT
Start: 2024-05-09 | End: 2024-06-08

## 2024-04-09 SDOH — SOCIAL STABILITY - SOCIAL INSECURITY: DISRUPTION OF FAMILY BY SEPARATION AND DIVORCE: Z63.5

## 2024-04-09 ASSESSMENT — ENCOUNTER SYMPTOMS
COUGH: 1
WHEEZING: 1
SHORTNESS OF BREATH: 0

## 2024-04-09 NOTE — PROGRESS NOTES
Fatemeh Ellison is a 33 y.o. female  Chief Complaint   Patient presents with    Follow-up     Vitals:    04/09/24 1147   BP: 139/79   Pulse: 85   Resp: 18   Temp: 97.6 °F (36.4 °C)   SpO2: 97%      Wt Readings from Last 3 Encounters:   04/09/24 72.8 kg (160 lb 9.6 oz)   03/04/24 74.4 kg (164 lb)   02/06/24 76.2 kg (168 lb)     BMI Readings from Last 3 Encounters:   04/09/24 31.37 kg/m²   03/04/24 32.03 kg/m²   02/06/24 32.81 kg/m²     Health Maintenance Due   Topic Date Due    Hepatitis B vaccine (1 of 3 - 3-dose series) Never done    Varicella vaccine (1 of 2 - 2-dose childhood series) Never done    COVID-19 Vaccine (3 - 2023-24 season) 09/01/2023     HPI  Anxiety - problematic daily with current marital separation.   Insomnia is improved with Hydroxyzine. Hasn't tried taking Hydroxyzine during the day.     Recurrent Bronchitis this past year. Currently on prednisone and AB from [facility].     ADD follow up - taking Adderall regularly.  Denies side effects including palpitations, anxiety, & insomnia.   Feels more productive on medication.   Feels that dose is effective.   BP is controlled.     Hx UC; seeing GI regularly. Currently under control.     ROS  Review of Systems   Constitutional:  Negative for fever.   Respiratory:  Positive for cough and wheezing. Negative for shortness of breath.    Cardiovascular:  Negative for chest pain and palpitations.   Neurological:  Negative for headaches.   Psychiatric/Behavioral:  Negative for dysphoric mood.      EXAM  Physical Exam  Vitals and nursing note reviewed.   Constitutional:       Appearance: Normal appearance.   HENT:      Head: Normocephalic and atraumatic.   Neck:      Vascular: No carotid bruit.   Cardiovascular:      Rate and Rhythm: Normal rate and regular rhythm.      Heart sounds: Normal heart sounds.   Pulmonary:      Effort: Pulmonary effort is normal. No respiratory distress.      Breath sounds: Rhonchi present. No wheezing.   Musculoskeletal:

## 2024-04-09 NOTE — PROGRESS NOTES
I have reviewed all needed documentation in preparation for visit. Verified patient by name and date of birth    Chief Complaint   Patient presents with    Follow-up       \"Have you been to the ER, urgent care clinic since your last visit?  Hospitalized since your last visit?\"    Yes, [facility], [facility] and [facility] infection bronchitis, wheezing, last week    “Have you seen or consulted any other health care providers outside of [facility] since your last visit?”    NO            Click Here for Release of Records Request    Vitals:    04/09/24 1147   BP: 139/79   Site: Right Upper Arm   Position: Sitting   Cuff Size: Medium Adult   Pulse: 85   Resp: 18   Temp: 97.6 °F (36.4 °C)   TempSrc: Temporal   SpO2: 97%   Weight: 72.8 kg (160 lb 9.6 oz)   Height: 1.524 m (5')       Health Maintenance Due   Topic Date Due    Hepatitis B vaccine (1 of 3 - 3-dose series) Never done    Varicella vaccine (1 of 2 - 2-dose childhood series) Never done    COVID-19 Vaccine (3 - 2023-24 season) 09/01/2023       Emma Loya LPN

## 2024-04-18 ENCOUNTER — PATIENT MESSAGE (OUTPATIENT)
Age: 34
End: 2024-04-18

## 2024-04-18 DIAGNOSIS — J45.41 RAD (REACTIVE AIRWAY DISEASE), MODERATE PERSISTENT, WITH ACUTE EXACERBATION: Primary | ICD-10-CM

## 2024-04-19 NOTE — TELEPHONE ENCOUNTER
From: Fatemeh Ellison  To: Andra Gipson  Sent: 4/18/2024 10:17 PM EDT  Subject: Medication     The inhaler you prescribed a week ago the pharmacy had to order after they ordered they said the insurance won’t cover it and it costs 300 dollars. Is there something else I can be given?

## 2024-05-16 DIAGNOSIS — F33.0 MILD EPISODE OF RECURRENT MAJOR DEPRESSIVE DISORDER (HCC): ICD-10-CM

## 2024-05-16 DIAGNOSIS — F41.9 ANXIETY: ICD-10-CM

## 2024-05-16 RX ORDER — ESCITALOPRAM OXALATE 20 MG/1
20 TABLET ORAL DAILY
Qty: 90 TABLET | Refills: 1 | OUTPATIENT
Start: 2024-05-16

## 2024-06-17 DIAGNOSIS — J45.41 RAD (REACTIVE AIRWAY DISEASE), MODERATE PERSISTENT, WITH ACUTE EXACERBATION: ICD-10-CM

## 2024-06-17 DIAGNOSIS — F90.0 ATTENTION DEFICIT HYPERACTIVITY DISORDER (ADHD), PREDOMINANTLY INATTENTIVE TYPE: ICD-10-CM

## 2024-06-17 RX ORDER — DEXTROAMPHETAMINE SACCHARATE, AMPHETAMINE ASPARTATE, DEXTROAMPHETAMINE SULFATE AND AMPHETAMINE SULFATE 5; 5; 5; 5 MG/1; MG/1; MG/1; MG/1
20 TABLET ORAL 2 TIMES DAILY
Qty: 60 TABLET | Refills: 0 | Status: SHIPPED | OUTPATIENT
Start: 2024-08-09 | End: 2024-09-08

## 2024-06-17 RX ORDER — DEXTROAMPHETAMINE SACCHARATE, AMPHETAMINE ASPARTATE, DEXTROAMPHETAMINE SULFATE AND AMPHETAMINE SULFATE 5; 5; 5; 5 MG/1; MG/1; MG/1; MG/1
20 TABLET ORAL 2 TIMES DAILY
Qty: 60 TABLET | Refills: 0 | Status: SHIPPED | OUTPATIENT
Start: 2024-07-09 | End: 2024-08-08

## 2024-06-17 RX ORDER — NORETHINDRONE ACETATE AND ETHINYL ESTRADIOL 1MG-20(24)
1 KIT ORAL DAILY
Qty: 3 PACKET | Refills: 2 | Status: SHIPPED | OUTPATIENT
Start: 2024-06-17

## 2024-06-17 RX ORDER — FLUTICASONE PROPIONATE AND SALMETEROL XINAFOATE 115; 21 UG/1; UG/1
2 AEROSOL, METERED RESPIRATORY (INHALATION) 2 TIMES DAILY
Qty: 1 EACH | Refills: 5 | Status: SHIPPED | OUTPATIENT
Start: 2024-06-17

## 2024-06-17 NOTE — TELEPHONE ENCOUNTER
----- Message from Fatemeh Ellison sent at 6/17/2024  9:56 AM EDT -----  Regarding: Medication  Contact: [phone]  Yes my adderall and advair

## 2024-06-17 NOTE — TELEPHONE ENCOUNTER
ARIES  Yes my adderall and advair       You  Fatemeh Ellison1 hour ago (9:51 AM)     TOMMY Weldon,  Your pharmacy has been changed in your chart and is on file for your refills.  Do you need any refills now ?  SHIRA Mcneal [facility] Clinical Staff (supporting Andra Gipson PA-C)3 days ago     ARIES Silverman I need to transfer all my medicines to a new pharmacy I recently got Medicaid and [facility] doesn’t except it.      If you can send to [place] [address] [place].      Thank you

## 2024-06-17 NOTE — TELEPHONE ENCOUNTER
----- Message from Fatemeh Ellison sent at 6/17/2024  9:56 AM EDT -----  Regarding: Medication  Contact: [phone]  Yes my adderall and advair

## 2024-06-17 NOTE — TELEPHONE ENCOUNTER
Refill request received from [facility]    for   Requested Prescriptions     Pending Prescriptions Disp Refills    fluticasone-salmeterol (ADVAIR HFA) 115-21 MCG/ACT inhaler 1 each 5     Sig: Inhale 2 puffs into the lungs 2 times daily    amphetamine-dextroamphetamine (ADDERALL) 20 MG tablet 60 tablet 0     Sig: Take 1 tablet by mouth 2 times daily for 30 days. Max Daily Amount: 40 mg     Last office visit: 4/9/2024   Next office visit: Visit date not found     Routed to MELISSA Muniz for review.     Emma Loya LPN

## 2024-06-19 ENCOUNTER — TELEPHONE (OUTPATIENT)
Age: 34
End: 2024-06-19

## 2024-06-19 NOTE — TELEPHONE ENCOUNTER
Pharmacist Kevin,stated Advair HFA  preferred,was picked up thru pharmacy benefit on yesterday 6/18/24.No PA needed.

## 2024-07-02 RX ORDER — NORETHINDRONE ACETATE AND ETHINYL ESTRADIOL 1MG-20(21)
1 KIT ORAL DAILY
Qty: 3 PACKET | Refills: 3 | Status: SHIPPED | OUTPATIENT
Start: 2024-07-02

## 2024-07-26 ENCOUNTER — PATIENT MESSAGE (OUTPATIENT)
Age: 34
End: 2024-07-26

## 2024-07-26 DIAGNOSIS — J45.41 RAD (REACTIVE AIRWAY DISEASE), MODERATE PERSISTENT, WITH ACUTE EXACERBATION: ICD-10-CM

## 2024-07-26 DIAGNOSIS — J30.9 ALLERGIC RHINITIS, UNSPECIFIED SEASONALITY, UNSPECIFIED TRIGGER: Primary | ICD-10-CM

## 2024-07-30 PROBLEM — J45.41: Status: ACTIVE | Noted: 2024-07-30

## 2024-07-30 PROBLEM — J30.9 ALLERGIC RHINITIS: Status: ACTIVE | Noted: 2024-07-30

## 2024-07-30 RX ORDER — LOPERAMIDE HYDROCHLORIDE 2 MG/1
CAPSULE ORAL
COMMUNITY
Start: 2024-07-02

## 2024-07-30 RX ORDER — LAMOTRIGINE 100 MG/1
100 TABLET ORAL DAILY
COMMUNITY
Start: 2024-07-15

## 2024-07-30 RX ORDER — ADALIMUMAB 40MG/0.4ML
KIT SUBCUTANEOUS
COMMUNITY
Start: 2024-07-15

## 2024-07-30 NOTE — TELEPHONE ENCOUNTER
From: Fatemeh Ellison  To: Andra Gipson  Sent: 7/26/2024 3:25 PM EDT  Subject: Court letter    Phillip Silverman,    I need a letter for court for a custody mckinnon that I’m in. Long story ree Mo is trying to say I’m  Unstable and I have some kind of diagnosis that I’m lying about.    I just need a letter stating what I’ve been diagnosed with In the past and what it is for the present. Which I believe was just anxiety.       I also need a list of medications and a letter stating I’ve been filling my meds when I’m supposed to and taking what I’m supposed to regularly.     I am seeing a therapist and a psychiatrist now as well. I have been for a few months. I will be getting letter from them also.    Thank you for your help!

## 2024-09-17 ENCOUNTER — TELEPHONE (OUTPATIENT)
Age: 34
End: 2024-09-17

## 2024-09-17 ENCOUNTER — OFFICE VISIT (OUTPATIENT)
Age: 34
End: 2024-09-17
Payer: COMMERCIAL

## 2024-09-17 VITALS
DIASTOLIC BLOOD PRESSURE: 76 MMHG | SYSTOLIC BLOOD PRESSURE: 140 MMHG | WEIGHT: 169 LBS | OXYGEN SATURATION: 99 % | BODY MASS INDEX: 33.18 KG/M2 | TEMPERATURE: 97.7 F | HEIGHT: 60 IN | HEART RATE: 90 BPM | RESPIRATION RATE: 18 BRPM

## 2024-09-17 DIAGNOSIS — J40 BRONCHITIS: Primary | ICD-10-CM

## 2024-09-17 DIAGNOSIS — J45.41 RAD (REACTIVE AIRWAY DISEASE), MODERATE PERSISTENT, WITH ACUTE EXACERBATION: ICD-10-CM

## 2024-09-17 DIAGNOSIS — K51.211 ULCERATIVE PROCTITIS WITH RECTAL BLEEDING (HCC): ICD-10-CM

## 2024-09-17 PROCEDURE — 99214 OFFICE O/P EST MOD 30 MIN: CPT | Performed by: PHYSICIAN ASSISTANT

## 2024-09-17 RX ORDER — AZITHROMYCIN 250 MG/1
1 TABLET, FILM COATED ORAL DAILY
COMMUNITY

## 2024-09-17 RX ORDER — DEXTROMETHORPHAN HYDROBROMIDE AND PROMETHAZINE HYDROCHLORIDE 15; 6.25 MG/5ML; MG/5ML
5 SYRUP ORAL 4 TIMES DAILY PRN
Qty: 200 ML | Refills: 1 | Status: SHIPPED | OUTPATIENT
Start: 2024-09-17

## 2024-09-17 RX ORDER — LEVOFLOXACIN 750 MG/1
750 TABLET, FILM COATED ORAL DAILY
Qty: 5 TABLET | Refills: 0 | Status: SHIPPED | OUTPATIENT
Start: 2024-09-17 | End: 2024-09-22

## 2024-09-17 ASSESSMENT — ENCOUNTER SYMPTOMS
SINUS PAIN: 0
EYE PAIN: 0
COUGH: 1
RHINORRHEA: 1
TROUBLE SWALLOWING: 0
SHORTNESS OF BREATH: 0
CHEST TIGHTNESS: 1

## 2024-12-10 RX ORDER — FLUTICASONE PROPIONATE AND SALMETEROL 50; 250 UG/1; UG/1
1 POWDER RESPIRATORY (INHALATION) 2 TIMES DAILY
Qty: 60 EACH | Refills: 5 | Status: SHIPPED | OUTPATIENT
Start: 2024-12-10

## 2024-12-10 RX ORDER — FLUTICASONE PROPIONATE AND SALMETEROL 50; 250 UG/1; UG/1
1 POWDER RESPIRATORY (INHALATION) 2 TIMES DAILY
COMMUNITY
Start: 2024-09-09 | End: 2024-12-10 | Stop reason: SDUPTHER

## 2024-12-10 NOTE — TELEPHONE ENCOUNTER
Refill request received from [facility] for   Requested Prescriptions     Pending Prescriptions Disp Refills    ADVAIR DISKUS 250-50 MCG/ACT AEPB diskus inhaler 60 each 2     Sig: Inhale 1 puff into the lungs 2 times daily     Last office visit: 9/17/2024   Next office visit: Visit date not found     Routed to MELISSA Muniz for review.         Iban Vargas Cma

## 2024-12-20 ENCOUNTER — PATIENT MESSAGE (OUTPATIENT)
Age: 34
End: 2024-12-20

## 2024-12-20 ENCOUNTER — TELEPHONE (OUTPATIENT)
Age: 34
End: 2024-12-20

## 2024-12-20 RX ORDER — VALACYCLOVIR HYDROCHLORIDE 500 MG/1
500 TABLET, FILM COATED ORAL DAILY
Qty: 30 TABLET | Refills: 0 | Status: SHIPPED | OUTPATIENT
Start: 2024-12-20

## 2024-12-20 NOTE — TELEPHONE ENCOUNTER
ew All Conversations on this Encounter  Paula Davey MD  You17 minutes ago (10:40 AM)       Needs appt today     Kirstie Zendejas LPN routed conversation to Paula Davey MD1 hour ago (9:21 AM)     [facility] Clinical Staff (supporting Paula Davey MD)1 hour ago (8:59 AM)     KB  Hello, I tried to make an appointment for Monday but everything Is booked. I think I have a UTI. It hurts when I pee. I wanted to see if it was possible to just be sent a prescription for the UTI and then come in after the holiday to be checked. I also wanted to see if you could send over valcyclovier as well.         This nurse called the patient back and patient is not able to come today for appointment , she is at work.    Patient reports burning with urination and some vaginal itching for about two days.    Patient was wanting to be seen on Monday and patient was advised she may not want to wait till Monday .    Patient states she will go to urgent care..    Patient will be due for ae after 1/25/2024.    Patient is wanting a refill of valacyclovir just in case, does not have current outbreak.    Past Medical History[]Expand by Default        Past Medical History:   Diagnosis Date    ADHD (attention deficit hyperactivity disorder)      Anemia      Anxiety      Bipolar 1 disorder (HCC)      Depression 4/3/2010    Genital herpes      Glucose intolerance (impaired glucose tolerance)      Herpes simplex virus (HSV) infection           This nurse does not see prescription for the valacyclovir but takes prn for out breaks      Please send refill    Pharmacy confirmed    Thank you

## 2024-12-20 NOTE — TELEPHONE ENCOUNTER
This nurse was not able to reach the patient but left a detailed message on identifiable voice mail that her prescription that was requested has been sent by MD to her confirmed pharmacy.

## 2025-01-13 RX ORDER — VALACYCLOVIR HYDROCHLORIDE 500 MG/1
500 TABLET, FILM COATED ORAL DAILY
Qty: 90 TABLET | Refills: 1 | OUTPATIENT
Start: 2025-01-13

## 2025-03-05 ENCOUNTER — OFFICE VISIT (OUTPATIENT)
Age: 35
End: 2025-03-05
Payer: COMMERCIAL

## 2025-03-05 VITALS
BODY MASS INDEX: 34.83 KG/M2 | SYSTOLIC BLOOD PRESSURE: 120 MMHG | WEIGHT: 177.4 LBS | HEIGHT: 60 IN | RESPIRATION RATE: 18 BRPM | OXYGEN SATURATION: 99 % | HEART RATE: 91 BPM | DIASTOLIC BLOOD PRESSURE: 79 MMHG

## 2025-03-05 DIAGNOSIS — N94.89 SUPPRESSION OF MENSES: ICD-10-CM

## 2025-03-05 DIAGNOSIS — Z01.419 WELL WOMAN EXAM: Primary | ICD-10-CM

## 2025-03-05 DIAGNOSIS — Z12.4 CERVICAL CANCER SCREENING: ICD-10-CM

## 2025-03-05 PROCEDURE — 99459 PELVIC EXAMINATION: CPT | Performed by: OBSTETRICS & GYNECOLOGY

## 2025-03-05 PROCEDURE — 99395 PREV VISIT EST AGE 18-39: CPT | Performed by: OBSTETRICS & GYNECOLOGY

## 2025-03-05 RX ORDER — NORETHINDRONE ACETATE AND ETHINYL ESTRADIOL 1MG-20(21)
1 KIT ORAL DAILY
Qty: 3 PACKET | Refills: 5 | Status: SHIPPED | OUTPATIENT
Start: 2025-03-05

## 2025-03-05 NOTE — PROGRESS NOTES
Fatemeh Ellison is a 34 y.o. female returns for an annual exam     Chief Complaint   Patient presents with    Annual Exam     No LMP recorded. (Menstrual status: Chemically Induced).  Takes pills contin.  She does not have dysmenorrhea.  Problems: lump in R.breast  Birth Control: OCP (estrogen/progesterone).  Last Pap: Normal, HPV Negative obtained 9/15/21.  She does not have a history of TRACEE 2, 3 or cervical cancer.   With regard to the Gardisil vaccine, she is older than the FDA approved age to receive it      1. Have you been to the ER, urgent care clinic, or hospitalized since your last visit? No    2. Have you seen or consulted any other health care providers outside of the [facility] since your last visit? No    Examination chaperoned by Kirstie Zendejas LPN.  
discharge present, no inflammatory lesions present, no masses present  Bladder: non-tender to palpation  Urethra: appears normal  Cervix: normal   Uterus: normal size, shape and consistency  Adnexa: no adnexal tenderness present, no adnexal masses present  Perineum: perineum within normal limits, no evidence of trauma, no rashes or skin lesions present  Anus: anus within normal limits, no hemorrhoids present  Inguinal Lymph Nodes: no lymphadenopathy present    Skin  General Inspection: no rash, no lesions identified    Neurologic/Psychiatric  Mental Status:  Orientation: grossly oriented to person, place and time  Mood and Affect: mood normal, affect appropriate    .  Assessment:  Routine gynecologic examination  Her current medical status is satisfactory with no evidence of significant gynecologic issues.  Vulvar lesion  Plan:  Counseled re: diet, exercise, healthy lifestyle  Return for yearly wellness visits  Pt counseled regarding co-testing for high risk HPV with pap  Continuous OCP  Return to office for vulvar biopsy

## 2025-03-07 NOTE — PROGRESS NOTES
Fatemeh Ellison is a 34 y.o. female presents for a problem visit.    Chief Complaint   Patient presents with    Biopsy     Vulvar     Problems: Vulvar bx   No LMP recorded. (Menstrual status: Chemically Induced).  Birth Control: OCP (estrogen/progesterone).  Last Pap: Normal, HPV Negative obtained 9/15/21    1. Have you been to the ER, urgent care clinic, or hospitalized since your last visit? No    2. Have you seen or consulted any other health care providers outside of the [facility] since your last visit? No    Chart reviewed for the following:   Kirstie WINTERS LPN, have reviewed the History, Physical and updated the Allergic reactions for Fatemeh Ellison     TIME OUT performed immediately prior to start of procedure:   Kirstie WINTERS LPN, have performed the following reviews on Fatemeh Ellison prior to the start of the procedure:            * Patient was identified by name and date of birth   * Agreement on procedure being performed was verified  * Risks and Benefits explained to the patient  * Procedure site verified and marked as necessary  * Patient was positioned for comfort  * Consent was signed and verified     Time: 150pm    Date of procedure: 3/24/2025    Procedure performed by:  Paula Davey MD       Provider assisted by: Kirstie Zendejas LPN    Patient assisted by: self    How tolerated by patient: tolerated the procedure well with no complications    Post Procedural Pain Scale: 0 - No Hurt    Comments: none    Examination chaperoned by Kirstie Zendejas LPN.

## 2025-03-10 ENCOUNTER — RESULTS FOLLOW-UP (OUTPATIENT)
Age: 35
End: 2025-03-10

## 2025-03-10 LAB
CYTOLOGIST CVX/VAG CYTO: NORMAL
CYTOLOGY CVX/VAG DOC CYTO: NORMAL
CYTOLOGY CVX/VAG DOC THIN PREP: NORMAL
DX ICD CODE: NORMAL
HPV GENOTYPE REFLEX: NORMAL
HPV I/H RISK 4 DNA CVX QL PROBE+SIG AMP: NEGATIVE
OTHER STN SPEC: NORMAL
SERVICE CMNT-IMP: NORMAL
STAT OF ADQ CVX/VAG CYTO-IMP: NORMAL

## 2025-03-24 ENCOUNTER — PROCEDURE VISIT (OUTPATIENT)
Age: 35
End: 2025-03-24
Payer: COMMERCIAL

## 2025-03-24 VITALS
DIASTOLIC BLOOD PRESSURE: 72 MMHG | HEIGHT: 60 IN | RESPIRATION RATE: 20 BRPM | HEART RATE: 99 BPM | WEIGHT: 180 LBS | SYSTOLIC BLOOD PRESSURE: 123 MMHG | BODY MASS INDEX: 35.34 KG/M2

## 2025-03-24 DIAGNOSIS — N90.89 VULVAR LESION: Primary | ICD-10-CM

## 2025-03-24 PROCEDURE — 56605 BIOPSY OF VULVA/PERINEUM: CPT | Performed by: OBSTETRICS & GYNECOLOGY

## 2025-03-24 RX ADMIN — Medication 5 ML: at 14:10

## 2025-03-24 NOTE — PROGRESS NOTES
Procedure note: Vulvar/Vaginal biopsy    Indications:  Fatemeh Ellison is a 34 y.o. female  /  that was found to have a vulva lesion/s. Black lesion left lateral to clitoris on underside of labia minora. After being presented with the risks, benefits and specific details of the procedure, she had no further questions.   Procedure:The patient was placed on the table in a dorsal lithotomy position and draped in the appropriate manner. The area was prepped with Zephiran . Once cleansed, the area was injected with three cc's of 1% Lidocaine without epinephrine, using a 25 gauge needle. After adequate anesthesia was reached, the skin was flattened with one hand and a portion of the abnormal area was excised with an iris scissors. It was 3 mm in size. The specimen was placed in formalin and sent to pathology for evaluation.   Pressure was applied to the biopsy site to control bleeding and no sutures were placed to close the wound. Hemostasis was adequate with direct pressure and silver nitrate.   The patient tolerated the procedure well. There were no complications.  She was observed for 10 minutes and was discharged in good condition.

## 2025-03-27 ENCOUNTER — RESULTS FOLLOW-UP (OUTPATIENT)
Age: 35
End: 2025-03-27

## 2025-03-27 LAB
CPT BILLING CODE: NORMAL
DIAGNOSIS SYNOPSIS:: NORMAL
DX ICD CODE: NORMAL
DX ICD CODE: NORMAL
PATH REPORT.COMMENTS IMP SPEC: NORMAL
PATH REPORT.FINAL DX SPEC: NORMAL
PATH REPORT.GROSS SPEC: NORMAL
PATH REPORT.SITE OF ORIGIN SPEC: NORMAL
PATHOLOGIST NAME: NORMAL
PAYMENT PROCEDURE: NORMAL

## 2025-05-15 ENCOUNTER — OFFICE VISIT (OUTPATIENT)
Age: 35
End: 2025-05-15
Payer: COMMERCIAL

## 2025-05-15 VITALS
HEIGHT: 61 IN | WEIGHT: 175 LBS | DIASTOLIC BLOOD PRESSURE: 75 MMHG | SYSTOLIC BLOOD PRESSURE: 118 MMHG | BODY MASS INDEX: 33.04 KG/M2 | HEART RATE: 91 BPM | TEMPERATURE: 97.8 F | RESPIRATION RATE: 20 BRPM | OXYGEN SATURATION: 97 %

## 2025-05-15 DIAGNOSIS — Z00.00 ANNUAL PHYSICAL EXAM: Primary | ICD-10-CM

## 2025-05-15 DIAGNOSIS — Z77.21 EXPOSURE TO POTENTIALLY HAZARDOUS BODY FLUIDS: ICD-10-CM

## 2025-05-15 DIAGNOSIS — Z00.00 ANNUAL PHYSICAL EXAM: ICD-10-CM

## 2025-05-15 DIAGNOSIS — B00.9 HSV-1 (HERPES SIMPLEX VIRUS 1) INFECTION: ICD-10-CM

## 2025-05-15 PROCEDURE — 99395 PREV VISIT EST AGE 18-39: CPT | Performed by: PHYSICIAN ASSISTANT

## 2025-05-15 RX ORDER — ESCITALOPRAM OXALATE 5 MG/1
5 TABLET ORAL DAILY
COMMUNITY

## 2025-05-15 RX ORDER — TRAZODONE HYDROCHLORIDE 50 MG/1
TABLET ORAL
COMMUNITY
Start: 2025-05-08

## 2025-05-15 RX ORDER — ALBUTEROL SULFATE AND BUDESONIDE 90; 80 UG/1; UG/1
AEROSOL, METERED RESPIRATORY (INHALATION)
COMMUNITY
Start: 2025-03-03

## 2025-05-15 SDOH — ECONOMIC STABILITY: FOOD INSECURITY: WITHIN THE PAST 12 MONTHS, THE FOOD YOU BOUGHT JUST DIDN'T LAST AND YOU DIDN'T HAVE MONEY TO GET MORE.: NEVER TRUE

## 2025-05-15 SDOH — ECONOMIC STABILITY: FOOD INSECURITY: WITHIN THE PAST 12 MONTHS, YOU WORRIED THAT YOUR FOOD WOULD RUN OUT BEFORE YOU GOT MONEY TO BUY MORE.: NEVER TRUE

## 2025-05-15 ASSESSMENT — PATIENT HEALTH QUESTIONNAIRE - PHQ9
9. THOUGHTS THAT YOU WOULD BE BETTER OFF DEAD, OR OF HURTING YOURSELF: NOT AT ALL
SUM OF ALL RESPONSES TO PHQ QUESTIONS 1-9: 9
10. IF YOU CHECKED OFF ANY PROBLEMS, HOW DIFFICULT HAVE THESE PROBLEMS MADE IT FOR YOU TO DO YOUR WORK, TAKE CARE OF THINGS AT HOME, OR GET ALONG WITH OTHER PEOPLE: NOT DIFFICULT AT ALL
7. TROUBLE CONCENTRATING ON THINGS, SUCH AS READING THE NEWSPAPER OR WATCHING TELEVISION: SEVERAL DAYS
SUM OF ALL RESPONSES TO PHQ QUESTIONS 1-9: 9
6. FEELING BAD ABOUT YOURSELF - OR THAT YOU ARE A FAILURE OR HAVE LET YOURSELF OR YOUR FAMILY DOWN: NOT AT ALL
3. TROUBLE FALLING OR STAYING ASLEEP: NEARLY EVERY DAY
SUM OF ALL RESPONSES TO PHQ QUESTIONS 1-9: 9
SUM OF ALL RESPONSES TO PHQ QUESTIONS 1-9: 9
8. MOVING OR SPEAKING SO SLOWLY THAT OTHER PEOPLE COULD HAVE NOTICED. OR THE OPPOSITE, BEING SO FIGETY OR RESTLESS THAT YOU HAVE BEEN MOVING AROUND A LOT MORE THAN USUAL: NOT AT ALL
4. FEELING TIRED OR HAVING LITTLE ENERGY: NEARLY EVERY DAY
2. FEELING DOWN, DEPRESSED OR HOPELESS: NOT AT ALL
1. LITTLE INTEREST OR PLEASURE IN DOING THINGS: NOT AT ALL
5. POOR APPETITE OR OVEREATING: MORE THAN HALF THE DAYS

## 2025-05-15 ASSESSMENT — ENCOUNTER SYMPTOMS
VOMITING: 0
SINUS PRESSURE: 0
ABDOMINAL PAIN: 0
BLOOD IN STOOL: 0
SORE THROAT: 0
NAUSEA: 0
CONSTIPATION: 0
BACK PAIN: 0
SHORTNESS OF BREATH: 0
COUGH: 0
SINUS PAIN: 0
DIARRHEA: 1
EYE PAIN: 0

## 2025-05-15 NOTE — PROGRESS NOTES
I have reviewed all needed documentation in preparation for visit. Verified patient by name and date of birth    Chief Complaint   Patient presents with    Annual Exam     Pt denies clonidine Rx       Have you been to the ER, urgent care clinic since your last visit?  Hospitalized since your last visit?   NO    Have you seen or consulted any other health care providers outside our system since your last visit?   NO             Vitals:    05/15/25 1517   BP: 118/75   BP Site: Left Upper Arm   Patient Position: Sitting   BP Cuff Size: Medium Adult   Pulse: 91   Resp: 20   Temp: 97.8 °F (36.6 °C)   TempSrc: Temporal   SpO2: 97%   Weight: 79.4 kg (175 lb)   Height: 1.537 m (5' 0.51\")       Health Maintenance Due   Topic Date Due    Varicella vaccine (1 of 2 - 13+ 2-dose series) Never done    Hepatitis B vaccine (1 of 3 - 19+ 3-dose series) Never done    Pneumococcal 0-49 years Vaccine (1 of 2 - PCV) Never done    COVID-19 Vaccine (3 - 2024-25 season) 09/01/2024    Depression Monitoring  01/25/2025       Emma Loya LPN

## 2025-05-15 NOTE — PROGRESS NOTES
Fatemeh Ellison is a 34 y.o. female  Chief Complaint   Patient presents with    Annual Exam     Pt denies clonidine Rx     Vitals:    05/15/25 1517   BP: 118/75   Pulse: 91   Resp: 20   Temp: 97.8 °F (36.6 °C)   SpO2: 97%      Wt Readings from Last 3 Encounters:   05/15/25 79.4 kg (175 lb)   03/24/25 81.6 kg (180 lb)   03/05/25 80.5 kg (177 lb 6.4 oz)     BMI Readings from Last 3 Encounters:   05/15/25 33.60 kg/m²   03/24/25 35.15 kg/m²   03/05/25 34.65 kg/m²     Health Maintenance Due   Topic Date Due    Varicella vaccine (1 of 2 - 13+ 2-dose series) Never done    Hepatitis B vaccine (1 of 3 - 19+ 3-dose series) Never done    Pneumococcal 0-49 years Vaccine (1 of 2 - PCV) Never done    COVID-19 Vaccine (3 - 2024-25 season) 09/01/2024    Depression Monitoring  01/25/2025     Social History     Tobacco Use    Smoking status: Never    Smokeless tobacco: Never   Substance Use Topics    Alcohol use: No     Alcohol/week: 0.0 standard drinks of alcohol      Family History   Adopted: Yes      HPI  Pt presents for an Annual Physical.     Wt Readings from Last 3 Encounters:   05/15/25 79.4 kg (175 lb)   03/24/25 81.6 kg (180 lb)   03/05/25 80.5 kg (177 lb 6.4 oz)   Pt has been working on diet for weight loss.     Seeing psychiatry regularly and feeling emotionally well.     Would like STD testing. Asymptomatic and no known exposures. Hx HSV 1. Would like testing for HSV 2. No recent flares.     Review of Systems   Constitutional:  Negative for fever and unexpected weight change.   HENT:  Positive for congestion. Negative for sinus pressure, sinus pain and sore throat.    Eyes:  Negative for pain and visual disturbance.   Respiratory:  Negative for cough and shortness of breath.    Cardiovascular:  Negative for chest pain, palpitations and leg swelling.   Gastrointestinal:  Positive for diarrhea. Negative for abdominal pain, blood in stool, constipation, nausea and vomiting.        IBD no change   Endocrine: Negative for

## 2025-05-16 LAB
ALBUMIN SERPL-MCNC: 4 G/DL (ref 3.5–5)
ALBUMIN/GLOB SERPL: 1.1 (ref 1.1–2.2)
ALP SERPL-CCNC: 75 U/L (ref 45–117)
ALT SERPL-CCNC: 90 U/L (ref 12–78)
ANION GAP SERPL CALC-SCNC: 3 MMOL/L (ref 2–12)
AST SERPL-CCNC: 63 U/L (ref 15–37)
BILIRUB SERPL-MCNC: 0.6 MG/DL (ref 0.2–1)
BUN SERPL-MCNC: 11 MG/DL (ref 6–20)
BUN/CREAT SERPL: 13 (ref 12–20)
CALCIUM SERPL-MCNC: 8.8 MG/DL (ref 8.5–10.1)
CHLORIDE SERPL-SCNC: 109 MMOL/L (ref 97–108)
CHOLEST SERPL-MCNC: 186 MG/DL
CO2 SERPL-SCNC: 26 MMOL/L (ref 21–32)
COMMENT:: NORMAL
CREAT SERPL-MCNC: 0.83 MG/DL (ref 0.55–1.02)
ERYTHROCYTE [DISTWIDTH] IN BLOOD BY AUTOMATED COUNT: 14.3 % (ref 11.5–14.5)
EST. AVERAGE GLUCOSE BLD GHB EST-MCNC: 117 MG/DL
GLOBULIN SER CALC-MCNC: 3.8 G/DL (ref 2–4)
GLUCOSE SERPL-MCNC: 93 MG/DL (ref 65–100)
HBA1C MFR BLD: 5.7 % (ref 4–5.6)
HBV SURFACE AG SER QL: <0.1 INDEX
HBV SURFACE AG SER QL: NEGATIVE
HCT VFR BLD AUTO: 37.1 % (ref 35–47)
HCV AB SER IA-ACNC: 0.08 INDEX
HCV AB SERPL QL IA: NONREACTIVE
HDLC SERPL-MCNC: 66 MG/DL
HDLC SERPL: 2.8 (ref 0–5)
HGB BLD-MCNC: 12 G/DL (ref 11.5–16)
HIV 1+2 AB+HIV1 P24 AG SERPL QL IA: NONREACTIVE
HIV 1/2 RESULT COMMENT: NORMAL
LDLC SERPL CALC-MCNC: 98.2 MG/DL (ref 0–100)
MCH RBC QN AUTO: 26.5 PG (ref 26–34)
MCHC RBC AUTO-ENTMCNC: 32.3 G/DL (ref 30–36.5)
MCV RBC AUTO: 81.9 FL (ref 80–99)
NRBC # BLD: 0 K/UL (ref 0–0.01)
NRBC BLD-RTO: 0 PER 100 WBC
PLATELET # BLD AUTO: 340 K/UL (ref 150–400)
PMV BLD AUTO: 11 FL (ref 8.9–12.9)
POTASSIUM SERPL-SCNC: 3.8 MMOL/L (ref 3.5–5.1)
PROT SERPL-MCNC: 7.8 G/DL (ref 6.4–8.2)
RBC # BLD AUTO: 4.53 M/UL (ref 3.8–5.2)
SODIUM SERPL-SCNC: 138 MMOL/L (ref 136–145)
SPECIMEN HOLD: NORMAL
TRIGL SERPL-MCNC: 109 MG/DL
TSH SERPL DL<=0.05 MIU/L-ACNC: 1.55 UIU/ML (ref 0.36–3.74)
VLDLC SERPL CALC-MCNC: 21.8 MG/DL
WBC # BLD AUTO: 9.5 K/UL (ref 3.6–11)

## 2025-05-17 LAB
HSV1 IGG SER IA-ACNC: REACTIVE
HSV2 IGG SER IA-ACNC: NON REACTIVE

## 2025-05-19 ENCOUNTER — RESULTS FOLLOW-UP (OUTPATIENT)
Age: 35
End: 2025-05-19

## 2025-05-19 DIAGNOSIS — K76.0 FATTY LIVER: ICD-10-CM

## 2025-05-19 DIAGNOSIS — R74.8 ELEVATED LIVER ENZYMES: Primary | ICD-10-CM

## 2025-05-19 LAB
C TRACH RRNA SPEC QL NAA+PROBE: NEGATIVE
N GONORRHOEA RRNA SPEC QL NAA+PROBE: NEGATIVE
SPECIMEN SOURCE: NORMAL
T PALLIDUM AB SER QL IA: NON REACTIVE
T VAGINALIS RRNA SPEC QL NAA+PROBE: NEGATIVE

## 2025-05-30 ENCOUNTER — HOSPITAL ENCOUNTER (EMERGENCY)
Facility: HOSPITAL | Age: 35
Discharge: HOME | End: 2025-05-30
Attending: EMERGENCY MEDICINE
Payer: COMMERCIAL

## 2025-05-30 ENCOUNTER — APPOINTMENT (OUTPATIENT)
Facility: HOSPITAL | Age: 35
End: 2025-05-30
Payer: COMMERCIAL

## 2025-05-30 VITALS
HEART RATE: 81 BPM | TEMPERATURE: 98.7 F | SYSTOLIC BLOOD PRESSURE: 141 MMHG | OXYGEN SATURATION: 96 % | WEIGHT: 175 LBS | RESPIRATION RATE: 16 BRPM | BODY MASS INDEX: 33.04 KG/M2 | DIASTOLIC BLOOD PRESSURE: 60 MMHG | HEIGHT: 61 IN

## 2025-05-30 DIAGNOSIS — R19.7 DIARRHEA, UNSPECIFIED TYPE: ICD-10-CM

## 2025-05-30 DIAGNOSIS — R10.32 LEFT LOWER QUADRANT ABDOMINAL PAIN: Primary | ICD-10-CM

## 2025-05-30 LAB
ALBUMIN SERPL-MCNC: 3.7 G/DL (ref 3.5–5)
ALBUMIN/GLOB SERPL: 0.9 (ref 1.1–2.2)
ALP SERPL-CCNC: 94 U/L (ref 45–117)
ALT SERPL-CCNC: 111 U/L (ref 12–78)
ANION GAP SERPL CALC-SCNC: 8 MMOL/L (ref 2–12)
APPEARANCE UR: ABNORMAL
AST SERPL-CCNC: 51 U/L (ref 15–37)
BACTERIA URNS QL MICRO: ABNORMAL /HPF
BASOPHILS # BLD: 0.08 K/UL (ref 0–0.1)
BASOPHILS NFR BLD: 1.1 % (ref 0–1)
BILIRUB SERPL-MCNC: 0.2 MG/DL (ref 0.2–1)
BILIRUB UR QL: NEGATIVE
BUN SERPL-MCNC: 9 MG/DL (ref 6–20)
BUN/CREAT SERPL: 10 (ref 12–20)
CALCIUM SERPL-MCNC: 8.9 MG/DL (ref 8.5–10.1)
CHLORIDE SERPL-SCNC: 106 MMOL/L (ref 97–108)
CO2 SERPL-SCNC: 27 MMOL/L (ref 21–32)
COLOR UR: ABNORMAL
CREAT SERPL-MCNC: 0.93 MG/DL (ref 0.55–1.02)
CRP SERPL-MCNC: 1.04 MG/DL (ref 0–0.3)
DIFFERENTIAL METHOD BLD: ABNORMAL
EOSINOPHIL # BLD: 0.1 K/UL (ref 0–0.4)
EOSINOPHIL NFR BLD: 1.3 % (ref 0–7)
EPITH CASTS URNS QL MICRO: ABNORMAL /LPF
ERYTHROCYTE [DISTWIDTH] IN BLOOD BY AUTOMATED COUNT: 14.7 % (ref 11.5–14.5)
ERYTHROCYTE [SEDIMENTATION RATE] IN BLOOD: 23 MM/HR (ref 0–20)
GLOBULIN SER CALC-MCNC: 4.2 G/DL (ref 2–4)
GLUCOSE SERPL-MCNC: 106 MG/DL (ref 65–100)
GLUCOSE UR STRIP.AUTO-MCNC: NEGATIVE MG/DL
HCG UR QL: NEGATIVE
HCT VFR BLD AUTO: 36.7 % (ref 35–47)
HGB BLD-MCNC: 12.2 G/DL (ref 11.5–16)
HGB UR QL STRIP: NEGATIVE
IMM GRANULOCYTES # BLD AUTO: 0.03 K/UL (ref 0–0.04)
IMM GRANULOCYTES NFR BLD AUTO: 0.4 % (ref 0–0.5)
KETONES UR QL STRIP.AUTO: NEGATIVE MG/DL
LEUKOCYTE ESTERASE UR QL STRIP.AUTO: NEGATIVE
LIPASE SERPL-CCNC: 53 U/L (ref 13–75)
LYMPHOCYTES # BLD: 1.83 K/UL (ref 0.8–3.5)
LYMPHOCYTES NFR BLD: 24.1 % (ref 12–49)
MCH RBC QN AUTO: 26.6 PG (ref 26–34)
MCHC RBC AUTO-ENTMCNC: 33.2 G/DL (ref 30–36.5)
MCV RBC AUTO: 80 FL (ref 80–99)
MONOCYTES # BLD: 0.67 K/UL (ref 0–1)
MONOCYTES NFR BLD: 8.8 % (ref 5–13)
NEUTS SEG # BLD: 4.88 K/UL (ref 1.8–8)
NEUTS SEG NFR BLD: 64.3 % (ref 32–75)
NITRITE UR QL STRIP.AUTO: NEGATIVE
NRBC # BLD: 0 K/UL (ref 0–0.01)
NRBC BLD-RTO: 0 PER 100 WBC
PH UR STRIP: 7.5 (ref 5–8)
PLATELET # BLD AUTO: 339 K/UL (ref 150–400)
PMV BLD AUTO: 9.6 FL (ref 8.9–12.9)
POTASSIUM SERPL-SCNC: 4 MMOL/L (ref 3.5–5.1)
PROT SERPL-MCNC: 7.9 G/DL (ref 6.4–8.2)
PROT UR STRIP-MCNC: NEGATIVE MG/DL
RBC # BLD AUTO: 4.59 M/UL (ref 3.8–5.2)
RBC #/AREA URNS HPF: ABNORMAL /HPF (ref 0–5)
SODIUM SERPL-SCNC: 141 MMOL/L (ref 136–145)
SP GR UR REFRACTOMETRY: 1.02 (ref 1–1.03)
UROBILINOGEN UR QL STRIP.AUTO: 0.2 EU/DL (ref 0.2–1)
WBC # BLD AUTO: 7.6 K/UL (ref 3.6–11)
WBC URNS QL MICRO: ABNORMAL /HPF (ref 0–4)

## 2025-05-30 PROCEDURE — 74176 CT ABD & PELVIS W/O CONTRAST: CPT

## 2025-05-30 PROCEDURE — 81025 URINE PREGNANCY TEST: CPT

## 2025-05-30 PROCEDURE — 96375 TX/PRO/DX INJ NEW DRUG ADDON: CPT

## 2025-05-30 PROCEDURE — 6360000002 HC RX W HCPCS: Performed by: EMERGENCY MEDICINE

## 2025-05-30 PROCEDURE — 6370000000 HC RX 637 (ALT 250 FOR IP): Performed by: EMERGENCY MEDICINE

## 2025-05-30 PROCEDURE — 86140 C-REACTIVE PROTEIN: CPT

## 2025-05-30 PROCEDURE — 96374 THER/PROPH/DIAG INJ IV PUSH: CPT

## 2025-05-30 PROCEDURE — 85025 COMPLETE CBC W/AUTO DIFF WBC: CPT

## 2025-05-30 PROCEDURE — 80053 COMPREHEN METABOLIC PANEL: CPT

## 2025-05-30 PROCEDURE — 85652 RBC SED RATE AUTOMATED: CPT

## 2025-05-30 PROCEDURE — 83690 ASSAY OF LIPASE: CPT

## 2025-05-30 PROCEDURE — 81001 URINALYSIS AUTO W/SCOPE: CPT

## 2025-05-30 PROCEDURE — 99284 EMERGENCY DEPT VISIT MOD MDM: CPT

## 2025-05-30 RX ORDER — MORPHINE SULFATE 4 MG/ML
4 INJECTION, SOLUTION INTRAMUSCULAR; INTRAVENOUS
Status: COMPLETED | OUTPATIENT
Start: 2025-05-30 | End: 2025-05-30

## 2025-05-30 RX ORDER — DICYCLOMINE HCL 20 MG
20 TABLET ORAL
Status: COMPLETED | OUTPATIENT
Start: 2025-05-30 | End: 2025-05-30

## 2025-05-30 RX ORDER — ONDANSETRON 2 MG/ML
4 INJECTION INTRAMUSCULAR; INTRAVENOUS EVERY 6 HOURS PRN
Status: DISCONTINUED | OUTPATIENT
Start: 2025-05-30 | End: 2025-05-31 | Stop reason: HOSPADM

## 2025-05-30 RX ORDER — DICYCLOMINE HCL 20 MG
20 TABLET ORAL 4 TIMES DAILY PRN
Qty: 28 TABLET | Refills: 0 | Status: SHIPPED | OUTPATIENT
Start: 2025-05-30 | End: 2025-06-06

## 2025-05-30 RX ORDER — ONDANSETRON 4 MG/1
4 TABLET, ORALLY DISINTEGRATING ORAL 3 TIMES DAILY PRN
Qty: 21 TABLET | Refills: 0 | Status: SHIPPED | OUTPATIENT
Start: 2025-05-30

## 2025-05-30 RX ADMIN — MORPHINE SULFATE 4 MG: 4 INJECTION INTRAVENOUS at 22:13

## 2025-05-30 RX ADMIN — DICYCLOMINE HYDROCHLORIDE 20 MG: 20 TABLET ORAL at 20:19

## 2025-05-30 RX ADMIN — ONDANSETRON 4 MG: 2 INJECTION, SOLUTION INTRAMUSCULAR; INTRAVENOUS at 20:20

## 2025-05-30 ASSESSMENT — PAIN - FUNCTIONAL ASSESSMENT
PAIN_FUNCTIONAL_ASSESSMENT: 0-10
PAIN_FUNCTIONAL_ASSESSMENT: ACTIVITIES ARE NOT PREVENTED

## 2025-05-30 ASSESSMENT — PAIN DESCRIPTION - PAIN TYPE
TYPE: CHRONIC PAIN
TYPE: ACUTE PAIN

## 2025-05-30 ASSESSMENT — PAIN DESCRIPTION - ONSET: ONSET: ON-GOING

## 2025-05-30 ASSESSMENT — PAIN DESCRIPTION - ORIENTATION
ORIENTATION: LOWER;MID
ORIENTATION: UPPER

## 2025-05-30 ASSESSMENT — PAIN SCALES - GENERAL
PAINLEVEL_OUTOF10: 8
PAINLEVEL_OUTOF10: 8
PAINLEVEL_OUTOF10: 4
PAINLEVEL_OUTOF10: 6
PAINLEVEL_OUTOF10: 5

## 2025-05-30 ASSESSMENT — PAIN DESCRIPTION - FREQUENCY
FREQUENCY: INTERMITTENT
FREQUENCY: CONTINUOUS

## 2025-05-30 ASSESSMENT — PAIN DESCRIPTION - DESCRIPTORS
DESCRIPTORS: ACHING
DESCRIPTORS: CRAMPING

## 2025-05-30 ASSESSMENT — PAIN DESCRIPTION - LOCATION
LOCATION: ABDOMEN
LOCATION: ABDOMEN

## 2025-05-30 NOTE — ED TRIAGE NOTES
Pt reports diarrhea and abdominal pain x1 week. Pt reports she has a hx of IBS and UC and has been taking Imodium which helped with the diarrhea. Pt states her abdominal pain and nausea persisted. Pt states with the Imodium she became constipated, so she took stool softeners which made her diarrhea start again. Pt states she has had a lack of appetite. Pt states this does not feel like her normal IBS/UC,

## 2025-05-31 NOTE — ED PROVIDER NOTES
[place] EMERGENCY DEPARTMENT  EMERGENCY DEPARTMENT ENCOUNTER      Pt Name: Fatemeh Ellison  MRN: [id number]  Birthdate 1990  Date of evaluation: 2025  Provider: Reuben Benitez MD      HISTORY OF PRESENT ILLNESS      HPI  34-year-old female with a past medical history of ulcerative colitis on Humira presenting to the ER due to abdominal pain.  Patient says she was experiencing diarrhea that started a week ago.  She took some Imodium and this made her very constipated.  She subsequently took stool softeners and then the diarrhea started again.  She has had no blood in her diarrhea.  She has had nausea but no vomiting.  No fevers.  She says the symptoms feel different than her previous UC flares.  She contacted her GI doctor and was instructed to go to the emergency      Nursing Notes were reviewed.    REVIEW OF SYSTEMS         Review of Systems  All systems reviewed were negative less otherwise documented HPI      PAST MEDICAL HISTORY     Past Medical History:   Diagnosis Date    Anxiety     Bipolar 1 disorder (HCC)     Depression 4/3/2010    Genital herpes     Glucose intolerance (impaired glucose tolerance)     Herpes simplex virus (HSV) infection     IBD (inflammatory bowel disease)     Insomnia 4/3/2010    Liver disease     Papanicolaou smear for cervical cancer screening 2025    Normal    Supervision of normal pregnancy 2021    Intrauterine pregnancy with the following problems identified:  EDC 2022 D=US Covid vaccine booster due  Flu vaccine 2021 NIPTS- normal female Horizon- neg Hx of CS x 2 - repeat at term 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

## 2025-05-31 NOTE — DISCHARGE INSTRUCTIONS
I recommend staying away from Imodium at this time.  I recommend a brat diet and using the medications I am prescribing you.  Please follow-up with your gastroenterologist

## 2025-06-17 NOTE — PROGRESS NOTES
Fatemeh Ellison is a 35 y.o. female presents for a problem visit.    Chief Complaint   Patient presents with    Other     No LMP recorded. (Menstrual status: Chemically Induced).  Birth Control: OCP (estrogen/progesterone).  Last Pap: Normal, HPV Negative obtained 3/5/25    The patient is here to discuss libido/urinary issues.  Pees when she sneezes and laughs  Very low libido        1. Have you been to the ER, urgent care clinic, or hospitalized since your last visit? No    2. Have you seen or consulted any other health care providers outside of the [facility] since your last visit? No    Examination chaperoned by Kirstie Zendejas LPN.

## 2025-07-02 ENCOUNTER — OFFICE VISIT (OUTPATIENT)
Age: 35
End: 2025-07-02
Payer: COMMERCIAL

## 2025-07-02 VITALS
WEIGHT: 175.4 LBS | OXYGEN SATURATION: 98 % | HEIGHT: 60 IN | BODY MASS INDEX: 34.44 KG/M2 | DIASTOLIC BLOOD PRESSURE: 78 MMHG | RESPIRATION RATE: 18 BRPM | SYSTOLIC BLOOD PRESSURE: 112 MMHG | HEART RATE: 84 BPM

## 2025-07-02 DIAGNOSIS — N39.3 STRESS INCONTINENCE: ICD-10-CM

## 2025-07-02 DIAGNOSIS — R68.82 DECREASED LIBIDO: Primary | ICD-10-CM

## 2025-07-02 PROCEDURE — 99213 OFFICE O/P EST LOW 20 MIN: CPT | Performed by: OBSTETRICS & GYNECOLOGY

## 2025-07-02 NOTE — PROGRESS NOTES
History:  Fatemeh Ellison is a 35 y.o. year old No obstetric history on file.  /  female   No LMP recorded. (Menstrual status: Chemically Induced).    She presents for problem visit  Nurse Notes:  No LMP recorded. (Menstrual status: Chemically Induced).  Birth Control: OCP (estrogen/progesterone).  Last Pap: Normal, HPV Negative obtained 3/5/25     The patient is here to discuss libido/urinary issues.  Pees when she sneezes and laughs  Very low libido  -------------------------------------------------------------------------------------------------------------------    History of Present Illness  The patient presents for evaluation of low libido and urinary incontinence.    She reports a significant decrease in her sexual drive, which she attributes to the responsibilities of being a single mother to three children aged 6, 7, and 3. She has been using lubricants during sexual intercourse but notes that they tend to dry out quickly. She is currently in a new relationship and is seeking potential pharmacological interventions to address her low libido.    Additionally, she experiences urinary incontinence, particularly when sneezing, and has been advised to consult a urologist for this issue.    Family History   Adopted: Yes       Past Medical History:   Diagnosis Date    Anxiety     Bipolar 1 disorder (HCC)     Depression 4/3/2010    Genital herpes     Glucose intolerance (impaired glucose tolerance)     Herpes simplex virus (HSV) infection     IBD (inflammatory bowel disease)     Insomnia 4/3/2010    Liver disease     Papanicolaou smear for cervical cancer screening 03/05/2025    Normal    Supervision of normal pregnancy 11/30/2021    Intrauterine pregnancy with the following problems identified:  EDC 7/14/2022 D=US Covid vaccine booster due Jan 11 Flu vaccine 11/30/2021 NIPTS- normal female Horizon- neg Hx of CS x 2 - repeat at term 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

## (undated) DEVICE — (D)PREP SKN CHLRAPRP APPL 26ML -- CONVERT TO ITEM 371833

## (undated) DEVICE — BLADE ASSEMB CLP HAIR FINE --

## (undated) DEVICE — SOLIDIFIER FLUID 3000 CC ABSORB

## (undated) DEVICE — STRAP,POSITIONING,KNEE/BODY,FOAM,4X60": Brand: MEDLINE

## (undated) DEVICE — TRAY PREP DRY W/ PREM GLV 2 APPL 6 SPNG 2 UNDPD 1 OVERWRAP

## (undated) DEVICE — SUTURE MCRYL SZ 0 L36IN ABSRB UD L36MM CT-1 1/2 CIR Y946H

## (undated) DEVICE — STERILE POLYISOPRENE POWDER-FREE SURGICAL GLOVES: Brand: PROTEXIS

## (undated) DEVICE — CATH FOLEY 16F LUBRI-SIL IC --

## (undated) DEVICE — STAPLER SKIN SQ 30 ABSRB STPL DISP INSORB

## (undated) DEVICE — STERILE LATEX POWDER-FREE SURGICAL GLOVESWITH NITRILE COATING: Brand: PROTEXIS

## (undated) DEVICE — STRIP,CLOSURE,WOUND,MEDI-STRIP,1/2X4: Brand: MEDLINE

## (undated) DEVICE — MASTISOL ADHESIVE LIQ 2/3ML

## (undated) DEVICE — HANDLE LT SNAP ON ULT DURABLE LENS FOR TRUMPF ALC DISPOSABLE

## (undated) DEVICE — TIP CLEANER: Brand: VALLEYLAB

## (undated) DEVICE — GOWN,AURORA,FABRIC-REINFORCED,X-LARGE: Brand: MEDLINE

## (undated) DEVICE — C-SECTION II-LF: Brand: MEDLINE INDUSTRIES, INC.

## (undated) DEVICE — TOWEL,OR,DSP,ST,BLUE,STD,2/PK,40PK/CS: Brand: MEDLINE

## (undated) DEVICE — TRAY,URINE METER,100% SILICONE,16FR10ML: Brand: MEDLINE

## (undated) DEVICE — 3000CC GUARDIAN II: Brand: GUARDIAN

## (undated) DEVICE — LARGE, DISPOSABLE ALEXIS O C-SECTION PROTECTOR - RETRACTOR: Brand: ALEXIS ® O C-SECTION PROTECTOR - RETRACTOR

## (undated) DEVICE — SUTURE STRATAFIX SYMMETRIC PDS + SZ 1 L18IN ABSRB VLT L48MM SXPP1A400

## (undated) DEVICE — REM POLYHESIVE ADULT PATIENT RETURN ELECTRODE: Brand: VALLEYLAB

## (undated) DEVICE — PAD,ABDOMINAL,5"X9",ST,LF,25/BX: Brand: MEDLINE INDUSTRIES, INC.

## (undated) DEVICE — GARMENT,MEDLINE,DVT,INT,CALF,MED, GEN2: Brand: MEDLINE

## (undated) DEVICE — SOLUTION IV 1000ML 0.9% SOD CHL

## (undated) DEVICE — SYRINGE IRRIG 60ML SFT PLIABLE BLB EZ TO GRP 1 HND USE W/

## (undated) DEVICE — 3M™ MEDIPORE™ H SOFT CLOTH SURGICAL TAPE, 2863, 3 IN X 10 YD, 12/CASE: Brand: 3M™ MEDIPORE™

## (undated) DEVICE — ROCKER SWITCH PENCIL HOLSTER: Brand: VALLEYLAB